# Patient Record
Sex: FEMALE | Race: BLACK OR AFRICAN AMERICAN | Employment: OTHER | URBAN - METROPOLITAN AREA
[De-identification: names, ages, dates, MRNs, and addresses within clinical notes are randomized per-mention and may not be internally consistent; named-entity substitution may affect disease eponyms.]

---

## 2018-01-01 ENCOUNTER — HOSPITAL ENCOUNTER (INPATIENT)
Age: 83
LOS: 1 days | Discharge: HOME | DRG: 308 | End: 2018-04-28
Attending: EMERGENCY MEDICINE | Admitting: INTERNAL MEDICINE
Payer: MEDICARE

## 2018-01-01 ENCOUNTER — APPOINTMENT (OUTPATIENT)
Dept: GENERAL RADIOLOGY | Age: 83
DRG: 308 | End: 2018-01-01
Attending: EMERGENCY MEDICINE
Payer: MEDICARE

## 2018-01-01 VITALS
SYSTOLIC BLOOD PRESSURE: 127 MMHG | BODY MASS INDEX: 19.94 KG/M2 | DIASTOLIC BLOOD PRESSURE: 64 MMHG | WEIGHT: 124.1 LBS | TEMPERATURE: 97.8 F | RESPIRATION RATE: 21 BRPM | OXYGEN SATURATION: 100 % | HEART RATE: 73 BPM | HEIGHT: 66 IN

## 2018-01-01 DIAGNOSIS — I50.9 ACUTE ON CHRONIC CONGESTIVE HEART FAILURE, UNSPECIFIED HEART FAILURE TYPE (HCC): ICD-10-CM

## 2018-01-01 DIAGNOSIS — I48.91 ATRIAL FIBRILLATION, UNSPECIFIED TYPE (HCC): Primary | ICD-10-CM

## 2018-01-01 DIAGNOSIS — D64.9 ANEMIA, UNSPECIFIED TYPE: ICD-10-CM

## 2018-01-01 DIAGNOSIS — E83.42 HYPOMAGNESEMIA: ICD-10-CM

## 2018-01-01 LAB
ABO + RH BLD: NORMAL
ALBUMIN SERPL-MCNC: 2.2 G/DL (ref 3.2–4.6)
ALBUMIN/GLOB SERPL: 0.5 {RATIO} (ref 1.2–3.5)
ALP SERPL-CCNC: 104 U/L (ref 50–136)
ALT SERPL-CCNC: 15 U/L (ref 12–65)
ANION GAP SERPL CALC-SCNC: 12 MMOL/L (ref 7–16)
ANION GAP SERPL CALC-SCNC: 9 MMOL/L (ref 7–16)
APPEARANCE UR: CLEAR
AST SERPL-CCNC: 29 U/L (ref 15–37)
ATRIAL RATE: 375 BPM
BASOPHILS # BLD: 0 K/UL (ref 0–0.2)
BASOPHILS # BLD: 0.1 K/UL (ref 0–0.2)
BASOPHILS NFR BLD: 0 % (ref 0–2)
BASOPHILS NFR BLD: 1 % (ref 0–2)
BILIRUB SERPL-MCNC: 0.2 MG/DL (ref 0.2–1.1)
BILIRUB UR QL: NEGATIVE
BLD PROD TYP BPU: NORMAL
BLD PROD TYP BPU: NORMAL
BLOOD GROUP ANTIBODIES SERPL: NORMAL
BNP SERPL-MCNC: 417 PG/ML
BPU ID: NORMAL
BPU ID: NORMAL
BUN SERPL-MCNC: 18 MG/DL (ref 8–23)
BUN SERPL-MCNC: 20 MG/DL (ref 8–23)
CALCIUM SERPL-MCNC: 7.6 MG/DL (ref 8.3–10.4)
CALCIUM SERPL-MCNC: 7.9 MG/DL (ref 8.3–10.4)
CALCULATED R AXIS, ECG10: -5 DEGREES
CALCULATED T AXIS, ECG11: 33 DEGREES
CHLORIDE SERPL-SCNC: 105 MMOL/L (ref 98–107)
CHLORIDE SERPL-SCNC: 105 MMOL/L (ref 98–107)
CO2 SERPL-SCNC: 24 MMOL/L (ref 21–32)
CO2 SERPL-SCNC: 27 MMOL/L (ref 21–32)
COLOR UR: YELLOW
CREAT SERPL-MCNC: 1.37 MG/DL (ref 0.6–1)
CREAT SERPL-MCNC: 1.49 MG/DL (ref 0.6–1)
CROSSMATCH RESULT,%XM: NORMAL
CROSSMATCH RESULT,%XM: NORMAL
DIAGNOSIS, 93000: NORMAL
DIFFERENTIAL METHOD BLD: ABNORMAL
DIFFERENTIAL METHOD BLD: ABNORMAL
EOSINOPHIL # BLD: 0.2 K/UL (ref 0–0.8)
EOSINOPHIL # BLD: 0.4 K/UL (ref 0–0.8)
EOSINOPHIL NFR BLD: 2 % (ref 0.5–7.8)
EOSINOPHIL NFR BLD: 4 % (ref 0.5–7.8)
ERYTHROCYTE [DISTWIDTH] IN BLOOD BY AUTOMATED COUNT: 18.2 % (ref 11.9–14.6)
ERYTHROCYTE [DISTWIDTH] IN BLOOD BY AUTOMATED COUNT: 20.4 % (ref 11.9–14.6)
FERRITIN SERPL-MCNC: 22 NG/ML (ref 8–388)
FOLATE SERPL-MCNC: 13.4 NG/ML (ref 3.1–17.5)
GLOBULIN SER CALC-MCNC: 4.2 G/DL (ref 2.3–3.5)
GLUCOSE SERPL-MCNC: 80 MG/DL (ref 65–100)
GLUCOSE SERPL-MCNC: 96 MG/DL (ref 65–100)
GLUCOSE UR STRIP.AUTO-MCNC: NEGATIVE MG/DL
HCT VFR BLD AUTO: 25 % (ref 35.8–46.3)
HCT VFR BLD AUTO: 29.2 % (ref 35.8–46.3)
HGB BLD-MCNC: 7.3 G/DL (ref 11.7–15.4)
HGB BLD-MCNC: 9.2 G/DL (ref 11.7–15.4)
HGB RETIC QN AUTO: 23 PG (ref 29–35)
HGB UR QL STRIP: NEGATIVE
IMM GRANULOCYTES # BLD: 0 K/UL (ref 0–0.5)
IMM GRANULOCYTES # BLD: 0.1 K/UL (ref 0–0.5)
IMM GRANULOCYTES NFR BLD AUTO: 0 % (ref 0–5)
IMM GRANULOCYTES NFR BLD AUTO: 1 % (ref 0–5)
IMM RETICS NFR: 21 % (ref 3–15.9)
IRON SATN MFR SERPL: 6 %
IRON SERPL-MCNC: 27 UG/DL (ref 35–150)
KETONES UR QL STRIP.AUTO: NEGATIVE MG/DL
LEUKOCYTE ESTERASE UR QL STRIP.AUTO: NEGATIVE
LYMPHOCYTES # BLD: 2 K/UL (ref 0.5–4.6)
LYMPHOCYTES # BLD: 2 K/UL (ref 0.5–4.6)
LYMPHOCYTES NFR BLD: 18 % (ref 13–44)
LYMPHOCYTES NFR BLD: 20 % (ref 13–44)
MAGNESIUM SERPL-MCNC: 1.2 MG/DL (ref 1.8–2.4)
MAGNESIUM SERPL-MCNC: 1.4 MG/DL (ref 1.8–2.4)
MAGNESIUM SERPL-MCNC: 2 MG/DL (ref 1.8–2.4)
MCH RBC QN AUTO: 23.6 PG (ref 26.1–32.9)
MCH RBC QN AUTO: 26.1 PG (ref 26.1–32.9)
MCHC RBC AUTO-ENTMCNC: 29.2 G/DL (ref 31.4–35)
MCHC RBC AUTO-ENTMCNC: 31.5 G/DL (ref 31.4–35)
MCV RBC AUTO: 80.9 FL (ref 79.6–97.8)
MCV RBC AUTO: 82.7 FL (ref 79.6–97.8)
MONOCYTES # BLD: 0.5 K/UL (ref 0.1–1.3)
MONOCYTES # BLD: 0.9 K/UL (ref 0.1–1.3)
MONOCYTES NFR BLD: 5 % (ref 4–12)
MONOCYTES NFR BLD: 9 % (ref 4–12)
NEUTS SEG # BLD: 6.9 K/UL (ref 1.7–8.2)
NEUTS SEG # BLD: 7.9 K/UL (ref 1.7–8.2)
NEUTS SEG NFR BLD: 66 % (ref 43–78)
NEUTS SEG NFR BLD: 74 % (ref 43–78)
NITRITE UR QL STRIP.AUTO: NEGATIVE
PH UR STRIP: 5.5 [PH] (ref 5–9)
PLATELET # BLD AUTO: 312 K/UL (ref 150–450)
PLATELET # BLD AUTO: 334 K/UL (ref 150–450)
PMV BLD AUTO: 10 FL (ref 10.8–14.1)
PMV BLD AUTO: 10.1 FL (ref 10.8–14.1)
POTASSIUM SERPL-SCNC: 3.4 MMOL/L (ref 3.5–5.1)
POTASSIUM SERPL-SCNC: 3.7 MMOL/L (ref 3.5–5.1)
PROT SERPL-MCNC: 6.4 G/DL (ref 6.3–8.2)
PROT UR STRIP-MCNC: NEGATIVE MG/DL
Q-T INTERVAL, ECG07: 254 MS
QRS DURATION, ECG06: 102 MS
QTC CALCULATION (BEZET), ECG08: 441 MS
RBC # BLD AUTO: 3.09 M/UL (ref 4.05–5.25)
RBC # BLD AUTO: 3.53 M/UL (ref 4.05–5.25)
RETICS # AUTO: 0.05 M/UL (ref 0.02–0.08)
RETICS/RBC NFR AUTO: 1.7 % (ref 0.3–2)
SODIUM SERPL-SCNC: 141 MMOL/L (ref 136–145)
SODIUM SERPL-SCNC: 141 MMOL/L (ref 136–145)
SP GR UR REFRACTOMETRY: 1 (ref 1–1.02)
SPECIMEN EXP DATE BLD: NORMAL
STATUS OF UNIT,%ST: NORMAL
STATUS OF UNIT,%ST: NORMAL
TIBC SERPL-MCNC: 470 UG/DL (ref 250–450)
TROPONIN I SERPL-MCNC: <0.04 NG/ML (ref 0.02–0.05)
TSH SERPL DL<=0.005 MIU/L-ACNC: 2.76 UIU/ML (ref 0.36–3.74)
UNIT DIVISION, %UDIV: 0
UNIT DIVISION, %UDIV: 0
UROBILINOGEN UR QL STRIP.AUTO: 0.2 EU/DL (ref 0.2–1)
VENTRICULAR RATE, ECG03: 181 BPM
VIT B12 SERPL-MCNC: 365 PG/ML (ref 193–986)
WBC # BLD AUTO: 10.3 K/UL (ref 4.3–11.1)
WBC # BLD AUTO: 10.6 K/UL (ref 4.3–11.1)

## 2018-01-01 PROCEDURE — 85046 RETICYTE/HGB CONCENTRATE: CPT | Performed by: INTERNAL MEDICINE

## 2018-01-01 PROCEDURE — 83540 ASSAY OF IRON: CPT | Performed by: INTERNAL MEDICINE

## 2018-01-01 PROCEDURE — 86900 BLOOD TYPING SEROLOGIC ABO: CPT | Performed by: INTERNAL MEDICINE

## 2018-01-01 PROCEDURE — 74011000250 HC RX REV CODE- 250: Performed by: EMERGENCY MEDICINE

## 2018-01-01 PROCEDURE — 71045 X-RAY EXAM CHEST 1 VIEW: CPT

## 2018-01-01 PROCEDURE — 36415 COLL VENOUS BLD VENIPUNCTURE: CPT | Performed by: INTERNAL MEDICINE

## 2018-01-01 PROCEDURE — 77030032490 HC SLV COMPR SCD KNE COVD -B

## 2018-01-01 PROCEDURE — 83735 ASSAY OF MAGNESIUM: CPT | Performed by: INTERNAL MEDICINE

## 2018-01-01 PROCEDURE — 84443 ASSAY THYROID STIM HORMONE: CPT | Performed by: INTERNAL MEDICINE

## 2018-01-01 PROCEDURE — 99285 EMERGENCY DEPT VISIT HI MDM: CPT | Performed by: EMERGENCY MEDICINE

## 2018-01-01 PROCEDURE — 74011000302 HC RX REV CODE- 302: Performed by: INTERNAL MEDICINE

## 2018-01-01 PROCEDURE — 83735 ASSAY OF MAGNESIUM: CPT | Performed by: EMERGENCY MEDICINE

## 2018-01-01 PROCEDURE — 86580 TB INTRADERMAL TEST: CPT | Performed by: INTERNAL MEDICINE

## 2018-01-01 PROCEDURE — 82746 ASSAY OF FOLIC ACID SERUM: CPT | Performed by: INTERNAL MEDICINE

## 2018-01-01 PROCEDURE — 74011250636 HC RX REV CODE- 250/636: Performed by: FAMILY MEDICINE

## 2018-01-01 PROCEDURE — 81003 URINALYSIS AUTO W/O SCOPE: CPT | Performed by: INTERNAL MEDICINE

## 2018-01-01 PROCEDURE — 97161 PT EVAL LOW COMPLEX 20 MIN: CPT

## 2018-01-01 PROCEDURE — 97165 OT EVAL LOW COMPLEX 30 MIN: CPT

## 2018-01-01 PROCEDURE — 93005 ELECTROCARDIOGRAM TRACING: CPT | Performed by: EMERGENCY MEDICINE

## 2018-01-01 PROCEDURE — 96375 TX/PRO/DX INJ NEW DRUG ADDON: CPT | Performed by: EMERGENCY MEDICINE

## 2018-01-01 PROCEDURE — 74011250636 HC RX REV CODE- 250/636: Performed by: INTERNAL MEDICINE

## 2018-01-01 PROCEDURE — P9016 RBC LEUKOCYTES REDUCED: HCPCS | Performed by: INTERNAL MEDICINE

## 2018-01-01 PROCEDURE — 77030039270 HC TU BLD FLTR CARD -A

## 2018-01-01 PROCEDURE — 74011250637 HC RX REV CODE- 250/637: Performed by: INTERNAL MEDICINE

## 2018-01-01 PROCEDURE — 80048 BASIC METABOLIC PNL TOTAL CA: CPT | Performed by: INTERNAL MEDICINE

## 2018-01-01 PROCEDURE — 30243N1 TRANSFUSION OF NONAUTOLOGOUS RED BLOOD CELLS INTO CENTRAL VEIN, PERCUTANEOUS APPROACH: ICD-10-PCS | Performed by: INTERNAL MEDICINE

## 2018-01-01 PROCEDURE — 96374 THER/PROPH/DIAG INJ IV PUSH: CPT | Performed by: EMERGENCY MEDICINE

## 2018-01-01 PROCEDURE — 84484 ASSAY OF TROPONIN QUANT: CPT | Performed by: EMERGENCY MEDICINE

## 2018-01-01 PROCEDURE — 93306 TTE W/DOPPLER COMPLETE: CPT

## 2018-01-01 PROCEDURE — 74011250636 HC RX REV CODE- 250/636: Performed by: EMERGENCY MEDICINE

## 2018-01-01 PROCEDURE — 74011000258 HC RX REV CODE- 258: Performed by: INTERNAL MEDICINE

## 2018-01-01 PROCEDURE — 80053 COMPREHEN METABOLIC PANEL: CPT | Performed by: EMERGENCY MEDICINE

## 2018-01-01 PROCEDURE — 36430 TRANSFUSION BLD/BLD COMPNT: CPT

## 2018-01-01 PROCEDURE — 65270000029 HC RM PRIVATE

## 2018-01-01 PROCEDURE — 85025 COMPLETE CBC W/AUTO DIFF WBC: CPT | Performed by: INTERNAL MEDICINE

## 2018-01-01 PROCEDURE — 74011000250 HC RX REV CODE- 250: Performed by: INTERNAL MEDICINE

## 2018-01-01 PROCEDURE — 74011250637 HC RX REV CODE- 250/637: Performed by: EMERGENCY MEDICINE

## 2018-01-01 PROCEDURE — 85025 COMPLETE CBC W/AUTO DIFF WBC: CPT | Performed by: EMERGENCY MEDICINE

## 2018-01-01 PROCEDURE — 77030019605

## 2018-01-01 PROCEDURE — 82607 VITAMIN B-12: CPT | Performed by: INTERNAL MEDICINE

## 2018-01-01 PROCEDURE — 65660000000 HC RM CCU STEPDOWN

## 2018-01-01 PROCEDURE — 86923 COMPATIBILITY TEST ELECTRIC: CPT | Performed by: INTERNAL MEDICINE

## 2018-01-01 PROCEDURE — 83880 ASSAY OF NATRIURETIC PEPTIDE: CPT | Performed by: EMERGENCY MEDICINE

## 2018-01-01 PROCEDURE — 84484 ASSAY OF TROPONIN QUANT: CPT | Performed by: INTERNAL MEDICINE

## 2018-01-01 PROCEDURE — 82728 ASSAY OF FERRITIN: CPT | Performed by: INTERNAL MEDICINE

## 2018-01-01 RX ORDER — LANOLIN ALCOHOL/MO/W.PET/CERES
400 CREAM (GRAM) TOPICAL ONCE
Status: COMPLETED | OUTPATIENT
Start: 2018-01-01 | End: 2018-01-01

## 2018-01-01 RX ORDER — SIMVASTATIN 20 MG/1
10 TABLET, FILM COATED ORAL
Status: DISCONTINUED | OUTPATIENT
Start: 2018-01-01 | End: 2018-01-01 | Stop reason: HOSPADM

## 2018-01-01 RX ORDER — ONDANSETRON 2 MG/ML
4 INJECTION INTRAMUSCULAR; INTRAVENOUS
Status: DISCONTINUED | OUTPATIENT
Start: 2018-01-01 | End: 2018-01-01 | Stop reason: HOSPADM

## 2018-01-01 RX ORDER — MECLIZINE HYDROCHLORIDE 25 MG/1
25 TABLET ORAL
Qty: 60 TAB | Refills: 1 | Status: SHIPPED | OUTPATIENT
Start: 2018-01-01 | End: 2019-01-01

## 2018-01-01 RX ORDER — NALOXONE HYDROCHLORIDE 0.4 MG/ML
0.4 INJECTION, SOLUTION INTRAMUSCULAR; INTRAVENOUS; SUBCUTANEOUS AS NEEDED
Status: DISCONTINUED | OUTPATIENT
Start: 2018-01-01 | End: 2018-01-01 | Stop reason: HOSPADM

## 2018-01-01 RX ORDER — FUROSEMIDE 40 MG/1
40 TABLET ORAL DAILY
Status: DISCONTINUED | OUTPATIENT
Start: 2018-01-01 | End: 2018-01-01 | Stop reason: HOSPADM

## 2018-01-01 RX ORDER — LISINOPRIL 5 MG/1
5 TABLET ORAL DAILY
Status: DISCONTINUED | OUTPATIENT
Start: 2018-01-01 | End: 2018-01-01 | Stop reason: HOSPADM

## 2018-01-01 RX ORDER — CALCIUM CARBONATE 200(500)MG
1 TABLET,CHEWABLE ORAL
COMMUNITY
End: 2019-01-01

## 2018-01-01 RX ORDER — FUROSEMIDE 10 MG/ML
60 INJECTION INTRAMUSCULAR; INTRAVENOUS
Status: COMPLETED | OUTPATIENT
Start: 2018-01-01 | End: 2018-01-01

## 2018-01-01 RX ORDER — SODIUM CHLORIDE 9 MG/ML
250 INJECTION, SOLUTION INTRAVENOUS AS NEEDED
Status: DISCONTINUED | OUTPATIENT
Start: 2018-01-01 | End: 2018-01-01 | Stop reason: HOSPADM

## 2018-01-01 RX ORDER — SIMVASTATIN 20 MG/1
20 TABLET, FILM COATED ORAL
Status: DISCONTINUED | OUTPATIENT
Start: 2018-01-01 | End: 2018-01-01

## 2018-01-01 RX ORDER — DILTIAZEM HYDROCHLORIDE 120 MG/1
120 TABLET, FILM COATED ORAL DAILY
COMMUNITY
Start: 2019-01-01 | End: 2019-01-01 | Stop reason: SDUPTHER

## 2018-01-01 RX ORDER — LANOLIN ALCOHOL/MO/W.PET/CERES
325 CREAM (GRAM) TOPICAL
Qty: 30 TAB | Refills: 2 | Status: SHIPPED | OUTPATIENT
Start: 2018-01-01 | End: 2019-01-01 | Stop reason: SDUPTHER

## 2018-01-01 RX ORDER — SODIUM CHLORIDE 0.9 % (FLUSH) 0.9 %
5-10 SYRINGE (ML) INJECTION AS NEEDED
Status: DISCONTINUED | OUTPATIENT
Start: 2018-01-01 | End: 2018-01-01 | Stop reason: HOSPADM

## 2018-01-01 RX ORDER — FUROSEMIDE 10 MG/ML
40 INJECTION INTRAMUSCULAR; INTRAVENOUS ONCE
Status: COMPLETED | OUTPATIENT
Start: 2018-01-01 | End: 2018-01-01

## 2018-01-01 RX ORDER — PANTOPRAZOLE SODIUM 40 MG/1
40 TABLET, DELAYED RELEASE ORAL
Status: DISCONTINUED | OUTPATIENT
Start: 2018-01-01 | End: 2018-01-01 | Stop reason: HOSPADM

## 2018-01-01 RX ORDER — HYDROCODONE BITARTRATE AND ACETAMINOPHEN 5; 325 MG/1; MG/1
1 TABLET ORAL
Status: DISCONTINUED | OUTPATIENT
Start: 2018-01-01 | End: 2018-01-01 | Stop reason: HOSPADM

## 2018-01-01 RX ORDER — GUAIFENESIN 100 MG/5ML
81 LIQUID (ML) ORAL DAILY
COMMUNITY
End: 2019-01-01 | Stop reason: CLARIF

## 2018-01-01 RX ORDER — MAGNESIUM SULFATE 1 G/100ML
1 INJECTION INTRAVENOUS ONCE
Status: DISCONTINUED | OUTPATIENT
Start: 2018-01-01 | End: 2018-01-01

## 2018-01-01 RX ORDER — ACETAMINOPHEN 325 MG/1
650 TABLET ORAL
Status: DISCONTINUED | OUTPATIENT
Start: 2018-01-01 | End: 2018-01-01 | Stop reason: HOSPADM

## 2018-01-01 RX ORDER — LISINOPRIL 5 MG/1
5 TABLET ORAL DAILY
COMMUNITY
End: 2019-01-01 | Stop reason: ALTCHOICE

## 2018-01-01 RX ORDER — MAGNESIUM SULFATE 1 G/100ML
1 INJECTION INTRAVENOUS ONCE
Status: COMPLETED | OUTPATIENT
Start: 2018-01-01 | End: 2018-01-01

## 2018-01-01 RX ORDER — FUROSEMIDE 40 MG/1
40 TABLET ORAL DAILY
COMMUNITY
Start: 2019-01-01 | End: 2019-01-01 | Stop reason: SDUPTHER

## 2018-01-01 RX ORDER — GLUCOSAMINE SULFATE 1500 MG
1000 POWDER IN PACKET (EA) ORAL DAILY
COMMUNITY
End: 2019-01-01

## 2018-01-01 RX ORDER — POTASSIUM CHLORIDE 20MEQ/15ML
40 LIQUID (ML) ORAL
Status: DISCONTINUED | OUTPATIENT
Start: 2018-01-01 | End: 2018-01-01

## 2018-01-01 RX ORDER — FUROSEMIDE 10 MG/ML
40 INJECTION INTRAMUSCULAR; INTRAVENOUS DAILY
Status: DISCONTINUED | OUTPATIENT
Start: 2018-01-01 | End: 2018-01-01

## 2018-01-01 RX ORDER — LANOLIN ALCOHOL/MO/W.PET/CERES
1 CREAM (GRAM) TOPICAL
Status: DISCONTINUED | OUTPATIENT
Start: 2018-01-01 | End: 2018-01-01 | Stop reason: HOSPADM

## 2018-01-01 RX ORDER — DILTIAZEM HYDROCHLORIDE 60 MG/1
120 TABLET, FILM COATED ORAL DAILY
Status: DISCONTINUED | OUTPATIENT
Start: 2018-01-01 | End: 2018-01-01 | Stop reason: HOSPADM

## 2018-01-01 RX ORDER — MAGNESIUM SULFATE HEPTAHYDRATE 40 MG/ML
2 INJECTION, SOLUTION INTRAVENOUS ONCE
Status: COMPLETED | OUTPATIENT
Start: 2018-01-01 | End: 2018-01-01

## 2018-01-01 RX ORDER — OMEPRAZOLE 40 MG/1
40 CAPSULE, DELAYED RELEASE ORAL DAILY
COMMUNITY
Start: 2019-01-01

## 2018-01-01 RX ORDER — DILTIAZEM HYDROCHLORIDE 5 MG/ML
0.25 INJECTION INTRAVENOUS ONCE
Status: COMPLETED | OUTPATIENT
Start: 2018-01-01 | End: 2018-01-01

## 2018-01-01 RX ORDER — SODIUM CHLORIDE 0.9 % (FLUSH) 0.9 %
5-10 SYRINGE (ML) INJECTION EVERY 8 HOURS
Status: DISCONTINUED | OUTPATIENT
Start: 2018-01-01 | End: 2018-01-01 | Stop reason: HOSPADM

## 2018-01-01 RX ORDER — POTASSIUM CHLORIDE 20 MEQ/1
40 TABLET, EXTENDED RELEASE ORAL
Status: COMPLETED | OUTPATIENT
Start: 2018-01-01 | End: 2018-01-01

## 2018-01-01 RX ORDER — SIMVASTATIN 20 MG/1
20 TABLET, FILM COATED ORAL
COMMUNITY
End: 2019-01-01

## 2018-01-01 RX ORDER — LOPERAMIDE HCL 2 MG
2 TABLET ORAL
COMMUNITY
Start: 2019-01-01

## 2018-01-01 RX ORDER — MECLIZINE HYDROCHLORIDE 25 MG/1
25 TABLET ORAL DAILY
Status: ON HOLD | COMMUNITY
End: 2018-01-01

## 2018-01-01 RX ADMIN — FUROSEMIDE 60 MG: 10 INJECTION, SOLUTION INTRAMUSCULAR; INTRAVENOUS at 12:44

## 2018-01-01 RX ADMIN — POTASSIUM CHLORIDE 40 MEQ: 20 TABLET, EXTENDED RELEASE ORAL at 15:08

## 2018-01-01 RX ADMIN — FUROSEMIDE 40 MG: 10 INJECTION, SOLUTION INTRAMUSCULAR; INTRAVENOUS at 20:00

## 2018-01-01 RX ADMIN — TUBERCULIN PURIFIED PROTEIN DERIVATIVE 5 UNITS: 5 INJECTION, SOLUTION INTRADERMAL at 15:08

## 2018-01-01 RX ADMIN — Medication 10 ML: at 06:30

## 2018-01-01 RX ADMIN — SIMVASTATIN 20 MG: 20 TABLET, FILM COATED ORAL at 21:56

## 2018-01-01 RX ADMIN — Medication 10 ML: at 21:57

## 2018-01-01 RX ADMIN — LISINOPRIL 5 MG: 5 TABLET ORAL at 08:15

## 2018-01-01 RX ADMIN — DILTIAZEM HYDROCHLORIDE 13.5 MG: 5 INJECTION INTRAVENOUS at 10:53

## 2018-01-01 RX ADMIN — Medication 10 ML: at 14:00

## 2018-01-01 RX ADMIN — FUROSEMIDE 40 MG: 10 INJECTION, SOLUTION INTRAMUSCULAR; INTRAVENOUS at 08:15

## 2018-01-01 RX ADMIN — DILTIAZEM HYDROCHLORIDE 120 MG: 60 TABLET, FILM COATED ORAL at 10:39

## 2018-01-01 RX ADMIN — SODIUM CHLORIDE 250 ML: 900 INJECTION, SOLUTION INTRAVENOUS at 16:34

## 2018-01-01 RX ADMIN — MAGNESIUM SULFATE HEPTAHYDRATE 2 G: 40 INJECTION, SOLUTION INTRAVENOUS at 06:34

## 2018-01-01 RX ADMIN — SODIUM CHLORIDE 5 MG/HR: 900 INJECTION, SOLUTION INTRAVENOUS at 15:00

## 2018-01-01 RX ADMIN — Medication 10 ML: at 15:00

## 2018-01-01 RX ADMIN — PANTOPRAZOLE SODIUM 40 MG: 40 TABLET, DELAYED RELEASE ORAL at 06:37

## 2018-01-01 RX ADMIN — MAGNESIUM SULFATE HEPTAHYDRATE 1 G: 1 INJECTION, SOLUTION INTRAVENOUS at 07:34

## 2018-01-01 RX ADMIN — FERROUS SULFATE TAB 325 MG (65 MG ELEMENTAL FE) 325 MG: 325 (65 FE) TAB at 10:39

## 2018-01-01 RX ADMIN — Medication 400 MG: at 12:26

## 2018-04-27 PROBLEM — E83.42 HYPOMAGNESEMIA: Status: ACTIVE | Noted: 2018-01-01

## 2018-04-27 PROBLEM — N18.30 CKD (CHRONIC KIDNEY DISEASE) STAGE 3, GFR 30-59 ML/MIN (HCC): Chronic | Status: ACTIVE | Noted: 2018-01-01

## 2018-04-27 PROBLEM — Z90.2 S/P LOBECTOMY OF LUNG: Chronic | Status: ACTIVE | Noted: 2018-01-01

## 2018-04-27 PROBLEM — I50.23 SYSTOLIC CHF, ACUTE ON CHRONIC (HCC): Chronic | Status: ACTIVE | Noted: 2018-01-01

## 2018-04-27 PROBLEM — E87.6 HYPOKALEMIA: Status: ACTIVE | Noted: 2018-01-01

## 2018-04-27 PROBLEM — I48.91 ATRIAL FIBRILLATION WITH RAPID VENTRICULAR RESPONSE (HCC): Status: ACTIVE | Noted: 2018-01-01

## 2018-04-27 PROBLEM — D64.9 CHRONIC ANEMIA: Chronic | Status: ACTIVE | Noted: 2018-01-01

## 2018-04-27 NOTE — IP AVS SNAPSHOT
Summary of Care Report The Summary of Care report has been created to help improve care coordination. Users with access to [employer] or [address] (Web-based application) may access additional patient information including the Discharge Summary. If you are not currently a [address] user and need more information, please call the number listed below in the [place] [address] section and ask to be connected with Medical Records. Facility Information Name Address Phone [id number] [place] [address] [address] [place] [id number] [phone] Patient Information Patient Name Sex ALMA Lowe ([id number]) Female 1932 Discharge Information Admitting Provider Service Area Unit Dominik Oro MD / [place] [address] Intensive Care / [phone] Discharge Provider Discharge Date/Time Discharge Disposition Destination (none) 2018 (Pending) AHR (none) Patient Language Language ENGLISH [13] Hospital Problems as of 2018  Never Reviewed Class Noted - Resolved Last Modified POA Active Problems * (Principal)Atrial fibrillation with rapid ventricular response ([address]  2018 - Present 2018 by Dominik Oro MD Yes Entered by Dominik rOo MD  
  Systolic CHF, acute on chronic [facility]) (Chronic)  2018 - Present 2018 by Dominik Oro MD Yes Entered by Dominik Oro MD  
  Solitary right kidney (Chronic)  2018 - Present 2018 by Dominik Oro MD Yes Entered by Dominik Oro MD  
  S/P lobectomy of lung (Chronic)  2018 - Present 2018 by Dominik Oro MD Yes   Entered by Dominik Oro MD  
  CKD (chronic kidney disease) stage 3, GFR 30-59 ml/min (Chronic) 4/27/2018 - Present 4/27/2018 by Keenan Haynes MD Yes Entered by Keenan Haynes MD  
  Chronic anemia (Chronic)  4/27/2018 - Present 4/27/2018 by Keenan Haynes MD Yes Entered by Keenan Haynes MD  
  Hypomagnesemia  4/27/2018 - Present 4/27/2018 by Keenan Haynes MD Yes Entered by Keenan Haynes MD  
  Hypokalemia  4/27/2018 - Present 4/27/2018 by Keenan Haynes MD Yes Entered by Keenan Haynes MD  
  
You are allergic to the following No active allergies Current Discharge Medication List  
  
START taking these medications Dose & Instructions Dispensing Information Comments  
 ferrous sulfate 325 mg (65 mg iron) tablet Dose:  325 mg Take 1 Tab by mouth daily (with breakfast). Quantity:  30 Tab Refills:  2 CONTINUE these medications which have CHANGED Dose & Instructions Dispensing Information Comments  
 meclizine 25 mg tablet Commonly known as:  ANTIVERT What changed:   
- when to take this 
- reasons to take this Dose:  25 mg Take 1 Tab by mouth three (3) times daily as needed. Indications: Vertigo, dizziness Quantity:  60 Tab Refills:  1 CONTINUE these medications which have NOT CHANGED Dose & Instructions Dispensing Information Comments  
 aspirin 81 mg chewable tablet Dose:  81 mg Take 81 mg by mouth daily. Refills:  0  
   
 calcium carbonate 200 mg calcium (500 mg) Chew Commonly known as:  TUMS Dose:  1 Tab Take 1 Tab by mouth every six (6) hours as needed. Indications: Heartburn Refills:  0 CARDIZEM 120 mg tablet Generic drug:  dilTIAZem Dose:  120 mg Take 120 mg by mouth daily. Refills:  0  
   
 LASIX 40 mg tablet Generic drug:  furosemide Take  by mouth daily. Refills:  0  
   
 lisinopril 5 mg tablet Commonly known as:  Harpal Barbosa Dose:  5 mg Take 5 mg by mouth daily. Refills:  0 loperamide 2 mg tablet Commonly known as:  IMMODIUM Dose:  2 mg Take 2 mg by mouth four (4) times daily as needed for Diarrhea. Indications: Diarrhea Refills:  0  
   
 omeprazole 40 mg capsule Commonly known as:  PRILOSEC Dose:  40 mg Take 40 mg by mouth daily. Refills:  0  
   
 VITAMIN D3 1,000 unit Cap Generic drug:  cholecalciferol Dose:  1000 Units Take 1,000 Units by mouth daily. Refills:  0 ZOCOR 20 mg tablet Generic drug:  simvastatin Dose:  20 mg Take 20 mg by mouth nightly. Refills:  0 Current Immunizations Name Date  
 TB Skin Test (PPD) Intradermal 4/27/2018 Follow-up Information Follow up With Details Comments Contact Info Not On File [facility] In 1 week follow up Not On File (58) Patient has a PCP but that physician is not listed in [facility]. Discharge Instructions Follow up with Primary Care Physician in 1-2 weeks. DISCHARGE SUMMARY from Nurse PATIENT INSTRUCTIONS: 
 
 
F-face looks uneven A-arms unable to move or move unevenly S-speech slurred or non-existent T-time-call 911 as soon as signs and symptoms begin-DO NOT go Back to bed or wait to see if you get better-TIME IS BRAIN. Warning Signs of HEART ATTACK Call 911 if you have these symptoms: 
? Chest discomfort. Most heart attacks involve discomfort in the center of the chest that lasts more than a few minutes, or that goes away and comes back. It can feel like uncomfortable pressure, squeezing, fullness, or pain. ? Discomfort in other areas of the upper body. Symptoms can include pain or discomfort in one or both arms, the back, neck, jaw, or stomach. ? Shortness of breath with or without chest discomfort. ? Other signs may include breaking out in a cold sweat, nausea, or lightheadedness. Don't wait more than five minutes to call [address]! Fast action can save your life. Calling 911 is almost always the fastest way to get lifesaving treatment. Emergency Medical Services staff can begin treatment when they arrive  up to an hour sooner than if someone gets to the hospital by car. The discharge information has been reviewed with the patient. The patient verbalized understanding. Discharge medications reviewed with the patient and appropriate educational materials and side effects teaching were provided. ___________________________________________________________________________________________________________________________________ Learning About ACE Inhibitors for Heart Failure Introduction ACE (angiotensin-converting enzyme) inhibitors block an enzyme that makes blood vessels narrow. As a result, the blood vessels relax and widen. This lowers blood pressure. These medicines also put more water and salt into the urine. This also lowers blood pressure. In heart failure, your heart does not pump as much blood as your body needs. These medicines: · Make it easier for your heart to pump. · Help reduce symptoms. · Make a heart attack or stroke less likely. Examples These medicines include: · Benazepril (Lotensin). · Lisinopril (Prinivil, Zestril). · Ramipril (Altace). This is not a complete list. 
Possible side effects Side effects may include: · Cough. · Low blood pressure. You may feel dizzy and weak. · High potassium levels. · An allergic reaction. You may have other side effects or reactions not listed here. Check the information that comes with your medicine. What to know about taking this medicine · ACE inhibitors: ¨ Are often used to treat heart failure. They may be the only medicine used if your only symptoms are feeling tired and mildly short of breath with no fluid buildup (edema). But in most other cases, they are used with other medicines. ¨ Can cause a dry cough. If the cough is bad, talk to your doctor. You may need to try a different medicine. ¨ Can relieve symptoms, improve how you feel, and make it less likely you will need to stay in a hospital. And they can reduce the risk of early death. ¨ Can cause an allergic reaction of the skin. Symptoms may range from mild swelling to painful welts. It is rare to have severe swelling that makes it hard to breathe. · Take your medicines exactly as prescribed. Call your doctor if you think you are having a problem with your medicine. · Check with your doctor or pharmacist before you use any other medicines. This includes over-the-counter medicines. Make sure your doctor knows all of the medicines, vitamins, herbal products, and supplements you take. Taking some medicines together can cause problems. · You should not take an ACE inhibitor if you are pregnant or planning to become pregnant. · You may need regular blood tests. Where can you learn more? Go to http://nacho-mathew.info/. Enter S179 in the search box to learn more about \"Learning About ACE Inhibitors for Heart Failure. \" Current as of: September 21, 2016 Content Version: 11.4 © 4325-7286 [employer]. Care instructions adapted under license by [employer] (which disclaims liability or warranty for this information). If you have questions about a medical condition or this instruction, always ask your healthcare professional. [address] any warranty or liability for your use of this information. Anemia From Heavy Bleeding: Care Instructions Your Care Instructions Anemia means that your body does not have enough red blood cells. Red blood cells carry oxygen around the body. When you have anemia, you may feel dizzy, tired, and weak. You may also feel your heart pounding. For some people, it's hard to focus and think clearly. One common cause of anemia is bleeding. Bleeding from ulcers, hemorrhoids, cancer, or other problems can cause anemia. It may also be caused by heavy menstrual periods. Your treatment may include iron pills. Iron helps your body make hemoglobin. Hemoglobin is the part of the red blood cell that carries oxygen. If you have severe anemia, you may need a blood transfusion to give you red blood cells as quickly as possible. Sometimes it takes several months to get iron levels back to normal. 
Follow-up care is a key part of your treatment and safety. Be sure to make and go to all appointments, and call your doctor if you are having problems. It's also a good idea to know your test results and keep a list of the medicines you take. How can you care for yourself at home? · Be safe with medicines. Take your medicines exactly as prescribed. Call your doctor if you think you are having a problem with your medicine. · Follow your doctor's advice about eating foods that have a lot of iron in them. These include red meat, shellfish, poultry, and eggs. They also include beans, raisins, whole-grain bread, and leafy green vegetables. · Steam your vegetables. This is the best way to prepare them if you want to get as much iron as possible. · Iron pills can cause constipation. If you take them, there are things you can do to avoid constipation. Drink plenty of fluids, eat foods with a lot of fiber, and exercise every day. When should you call for help? Call 911 anytime you think you may need emergency care. For example, call if: 
? · You passed out (lost consciousness). ? · Your stools are maroon or very bloody. ?Call your doctor now or seek immediate medical care if: 
? · You are short of breath. ? · You have new or worse bleeding. ? · You are dizzy or light-headed, or you feel like you may faint. ? Watch closely for changes in your health, and be sure to contact your doctor if: ? · You feel weaker or more tired than usual.  
? · You do not get better as expected. Where can you learn more? Go to http://nacho-mathew.info/. Enter E008 in the search box to learn more about \"Anemia From Heavy Bleeding: Care Instructions. \" Current as of: October 13, 2016 Content Version: 11.4 © 9168-7787 [employer]. Care instructions adapted under license by [employer] (which disclaims liability or warranty for this information). If you have questions about a medical condition or this instruction, always ask your healthcare professional. [address] any warranty or liability for your use of this information. Anemia From Heavy Bleeding: Care Instructions Your Care Instructions Anemia means that your body does not have enough red blood cells. Red blood cells carry oxygen around the body. When you have anemia, you may feel dizzy, tired, and weak. You may also feel your heart pounding. For some people, it's hard to focus and think clearly. One common cause of anemia is bleeding. Bleeding from ulcers, hemorrhoids, cancer, or other problems can cause anemia. It may also be caused by heavy menstrual periods. Your treatment may include iron pills. Iron helps your body make hemoglobin. Hemoglobin is the part of the red blood cell that carries oxygen. If you have severe anemia, you may need a blood transfusion to give you red blood cells as quickly as possible. Sometimes it takes several months to get iron levels back to normal. 
Follow-up care is a key part of your treatment and safety. Be sure to make and go to all appointments, and call your doctor if you are having problems. It's also a good idea to know your test results and keep a list of the medicines you take. How can you care for yourself at home? · Be safe with medicines. Take your medicines exactly as prescribed.  Call your doctor if you think you are having a problem with your medicine. · Follow your doctor's advice about eating foods that have a lot of iron in them. These include red meat, shellfish, poultry, and eggs. They also include beans, raisins, whole-grain bread, and leafy green vegetables. · Steam your vegetables. This is the best way to prepare them if you want to get as much iron as possible. · Iron pills can cause constipation. If you take them, there are things you can do to avoid constipation. Drink plenty of fluids, eat foods with a lot of fiber, and exercise every day. When should you call for help? Call 911 anytime you think you may need emergency care. For example, call if: 
? · You passed out (lost consciousness). ? · Your stools are maroon or very bloody. ?Call your doctor now or seek immediate medical care if: 
? · You are short of breath. ? · You have new or worse bleeding. ? · You are dizzy or light-headed, or you feel like you may faint. ? Watch closely for changes in your health, and be sure to contact your doctor if: 
? · You feel weaker or more tired than usual.  
? · You do not get better as expected. Where can you learn more? Go to http://nacho-mathew.info/. Enter H127 in the search box to learn more about \"Anemia From Heavy Bleeding: Care Instructions. \" Current as of: October 13, 2016 Content Version: 11.4 © 8873-5350 [employer]. Care instructions adapted under license by [employer] (which disclaims liability or warranty for this information). If you have questions about a medical condition or this instruction, always ask your healthcare professional. [place] [address] any warranty or liability for your use of this information. Anemia: Care Instructions Your Care Instructions Anemia is a low level of red blood cells, which carry oxygen throughout your body. Many things can cause anemia. Lack of iron is one of the most common causes. Your body needs iron to make hemoglobin, a substance in red blood cells that carries oxygen from the lungs to your body's cells. Without enough iron, the body produces fewer and smaller red blood cells. As a result, your body's cells do not get enough oxygen, and you feel tired and weak. And you may have trouble concentrating. Bleeding is the most common cause of a lack of iron. You may have heavy menstrual bleeding or bleeding caused by conditions such as ulcers, hemorrhoids, or cancer. Regular use of aspirin or other anti-inflammatory medicines (such as ibuprofen) also can cause bleeding in some people. A lack of iron in your diet also can cause anemia, especially at times when the body needs more iron, such as during pregnancy, infancy, and the teen years. Your doctor may have prescribed iron pills. It may take several months of treatment for your iron levels to return to normal. Your doctor also may suggest that you eat foods that are rich in iron, such as meat and beans. There are many other causes of anemia. It is not always due to a lack of iron. Finding the specific cause of your anemia will help your doctor find the right treatment for you. Follow-up care is a key part of your treatment and safety. Be sure to make and go to all appointments, and call your doctor if you are having problems. It's also a good idea to know your test results and keep a list of the medicines you take. How can you care for yourself at home? · Take your medicines exactly as prescribed. Call your doctor if you think you are having a problem with your medicine. · If your doctor recommends iron pills, take them as directed: ¨ Try to take the pills on an empty stomach about 1 hour before or 2 hours after meals. But you may need to take iron with food to avoid an upset stomach. ¨ Do not take antacids or drink milk or caffeine drinks (such as coffee, tea, or cola) at the same time or within 2 hours of the time that you take your iron. They can make it hard for your body to absorb the iron. ¨ Vitamin C (from food or supplements) helps your body absorb iron. Try taking iron pills with a glass of orange juice or some other food that is high in vitamin C, such as citrus fruits. ¨ Iron pills may cause stomach problems, such as heartburn, nausea, diarrhea, constipation, and cramps. Be sure to drink plenty of fluids, and include fruits, vegetables, and fiber in your diet each day. Iron pills often make your bowel movements dark or green. ¨ If you forget to take an iron pill, do not take a double dose of iron the next time you take a pill. ¨ Keep iron pills out of the reach of small children. An overdose of iron can be very dangerous. · Follow your doctor's advice about eating iron-rich foods. These include red meat, shellfish, poultry, eggs, beans, raisins, whole-grain bread, and leafy green vegetables. · Steam vegetables to help them keep their iron content. When should you call for help? Call 911 anytime you think you may need emergency care. For example, call if: 
? · You have symptoms of a heart attack. These may include: ¨ Chest pain or pressure, or a strange feeling in the chest. 
¨ Sweating. ¨ Shortness of breath. ¨ Nausea or vomiting. ¨ Pain, pressure, or a strange feeling in the back, neck, jaw, or upper belly or in one or both shoulders or arms. ¨ Lightheadedness or sudden weakness. ¨ A fast or irregular heartbeat. After you call 911, the  may tell you to chew 1 adult-strength or 2 to 4 low-dose aspirin. Wait for an ambulance. Do not try to drive yourself. ? · You passed out (lost consciousness). ?Call your doctor now or seek immediate medical care if: 
? · You have new or increased shortness of breath. ? · You are dizzy or lightheaded, or you feel like you may faint. ? · Your fatigue and weakness continue or get worse. ? · You have any abnormal bleeding, such as: 
¨ Nosebleeds. ¨ Vaginal bleeding that is different (heavier, more frequent, at a different time of the month) than what you are used to. ¨ Bloody or black stools, or rectal bleeding. ¨ Bloody or pink urine. ? Watch closely for changes in your health, and be sure to contact your doctor if: 
? · You do not get better as expected. Where can you learn more? Go to http://nacho-mathew.info/. Enter R301 in the search box to learn more about \"Anemia: Care Instructions. \" Current as of: October 13, 2016 Content Version: 11.4 © 9624-9987 [employer]. Care instructions adapted under license by [employer] (which disclaims liability or warranty for this information). If you have questions about a medical condition or this instruction, always ask your healthcare professional. [place] [address] any warranty or liability for your use of this information. Anemia: Care Instructions Your Care Instructions Anemia is a low level of red blood cells, which carry oxygen throughout your body. Many things can cause anemia. Lack of iron is one of the most common causes. Your body needs iron to make hemoglobin, a substance in red blood cells that carries oxygen from the lungs to your body's cells. Without enough iron, the body produces fewer and smaller red blood cells. As a result, your body's cells do not get enough oxygen, and you feel tired and weak. And you may have trouble concentrating. Bleeding is the most common cause of a lack of iron. You may have heavy menstrual bleeding or bleeding caused by conditions such as ulcers, hemorrhoids, or cancer. Regular use of aspirin or other anti-inflammatory medicines (such as ibuprofen) also can cause bleeding in some people.  A lack of iron in your diet also can cause anemia, especially at times when the body needs more iron, such as during pregnancy, infancy, and the teen years. Your doctor may have prescribed iron pills. It may take several months of treatment for your iron levels to return to normal. Your doctor also may suggest that you eat foods that are rich in iron, such as meat and beans. There are many other causes of anemia. It is not always due to a lack of iron. Finding the specific cause of your anemia will help your doctor find the right treatment for you. Follow-up care is a key part of your treatment and safety. Be sure to make and go to all appointments, and call your doctor if you are having problems. It's also a good idea to know your test results and keep a list of the medicines you take. How can you care for yourself at home? · Take your medicines exactly as prescribed. Call your doctor if you think you are having a problem with your medicine. · If your doctor recommends iron pills, take them as directed: ¨ Try to take the pills on an empty stomach about 1 hour before or 2 hours after meals. But you may need to take iron with food to avoid an upset stomach. ¨ Do not take antacids or drink milk or caffeine drinks (such as coffee, tea, or cola) at the same time or within 2 hours of the time that you take your iron. They can make it hard for your body to absorb the iron. ¨ Vitamin C (from food or supplements) helps your body absorb iron. Try taking iron pills with a glass of orange juice or some other food that is high in vitamin C, such as citrus fruits. ¨ Iron pills may cause stomach problems, such as heartburn, nausea, diarrhea, constipation, and cramps. Be sure to drink plenty of fluids, and include fruits, vegetables, and fiber in your diet each day. Iron pills often make your bowel movements dark or green.  
¨ If you forget to take an iron pill, do not take a double dose of iron the next time you take a pill. ¨ Keep iron pills out of the reach of small children. An overdose of iron can be very dangerous. · Follow your doctor's advice about eating iron-rich foods. These include red meat, shellfish, poultry, eggs, beans, raisins, whole-grain bread, and leafy green vegetables. · Steam vegetables to help them keep their iron content. When should you call for help? Call 911 anytime you think you may need emergency care. For example, call if: 
? · You have symptoms of a heart attack. These may include: ¨ Chest pain or pressure, or a strange feeling in the chest. 
¨ Sweating. ¨ Shortness of breath. ¨ Nausea or vomiting. ¨ Pain, pressure, or a strange feeling in the back, neck, jaw, or upper belly or in one or both shoulders or arms. ¨ Lightheadedness or sudden weakness. ¨ A fast or irregular heartbeat. After you call 911, the  may tell you to chew 1 adult-strength or 2 to 4 low-dose aspirin. Wait for an ambulance. Do not try to drive yourself. ? · You passed out (lost consciousness). ?Call your doctor now or seek immediate medical care if: 
? · You have new or increased shortness of breath. ? · You are dizzy or lightheaded, or you feel like you may faint. ? · Your fatigue and weakness continue or get worse. ? · You have any abnormal bleeding, such as: 
¨ Nosebleeds. ¨ Vaginal bleeding that is different (heavier, more frequent, at a different time of the month) than what you are used to. ¨ Bloody or black stools, or rectal bleeding. ¨ Bloody or pink urine. ? Watch closely for changes in your health, and be sure to contact your doctor if: 
? · You do not get better as expected. Where can you learn more? Go to http://nacho-mathew.info/. Enter R301 in the search box to learn more about \"Anemia: Care Instructions. \" Current as of: October 13, 2016 Content Version: 11.4 © 5286-9663 [employer], [employer].  Care instructions adapted under license by [address] (which disclaims liability or warranty for this information). If you have questions about a medical condition or this instruction, always ask your healthcare professional. [address] any warranty or liability for your use of this information. Anemia: Care Instructions Your Care Instructions Anemia is a low level of red blood cells, which carry oxygen throughout your body. Many things can cause anemia. Lack of iron is one of the most common causes. Your body needs iron to make hemoglobin, a substance in red blood cells that carries oxygen from the lungs to your body's cells. Without enough iron, the body produces fewer and smaller red blood cells. As a result, your body's cells do not get enough oxygen, and you feel tired and weak. And you may have trouble concentrating. Bleeding is the most common cause of a lack of iron. You may have heavy menstrual bleeding or bleeding caused by conditions such as ulcers, hemorrhoids, or cancer. Regular use of aspirin or other anti-inflammatory medicines (such as ibuprofen) also can cause bleeding in some people. A lack of iron in your diet also can cause anemia, especially at times when the body needs more iron, such as during pregnancy, infancy, and the teen years. Your doctor may have prescribed iron pills. It may take several months of treatment for your iron levels to return to normal. Your doctor also may suggest that you eat foods that are rich in iron, such as meat and beans. There are many other causes of anemia. It is not always due to a lack of iron. Finding the specific cause of your anemia will help your doctor find the right treatment for you. Follow-up care is a key part of your treatment and safety. Be sure to make and go to all appointments, and call your doctor if you are having problems. It's also a good idea to know your test results and keep a list of the medicines you take. How can you care for yourself at home? · Take your medicines exactly as prescribed. Call your doctor if you think you are having a problem with your medicine. · If your doctor recommends iron pills, take them as directed: ¨ Try to take the pills on an empty stomach about 1 hour before or 2 hours after meals. But you may need to take iron with food to avoid an upset stomach. ¨ Do not take antacids or drink milk or caffeine drinks (such as coffee, tea, or cola) at the same time or within 2 hours of the time that you take your iron. They can make it hard for your body to absorb the iron. ¨ Vitamin C (from food or supplements) helps your body absorb iron. Try taking iron pills with a glass of orange juice or some other food that is high in vitamin C, such as citrus fruits. ¨ Iron pills may cause stomach problems, such as heartburn, nausea, diarrhea, constipation, and cramps. Be sure to drink plenty of fluids, and include fruits, vegetables, and fiber in your diet each day. Iron pills often make your bowel movements dark or green. ¨ If you forget to take an iron pill, do not take a double dose of iron the next time you take a pill. ¨ Keep iron pills out of the reach of small children. An overdose of iron can be very dangerous. · Follow your doctor's advice about eating iron-rich foods. These include red meat, shellfish, poultry, eggs, beans, raisins, whole-grain bread, and leafy green vegetables. · Steam vegetables to help them keep their iron content. When should you call for help? Call 911 anytime you think you may need emergency care. For example, call if: 
? · You have symptoms of a heart attack. These may include: ¨ Chest pain or pressure, or a strange feeling in the chest. 
¨ Sweating. ¨ Shortness of breath. ¨ Nausea or vomiting. ¨ Pain, pressure, or a strange feeling in the back, neck, jaw, or upper belly or in one or both shoulders or arms. ¨ Lightheadedness or sudden weakness. ¨ A fast or irregular heartbeat. After you call 911, the  may tell you to chew 1 adult-strength or 2 to 4 low-dose aspirin. Wait for an ambulance. Do not try to drive yourself. ? · You passed out (lost consciousness). ?Call your doctor now or seek immediate medical care if: 
? · You have new or increased shortness of breath. ? · You are dizzy or lightheaded, or you feel like you may faint. ? · Your fatigue and weakness continue or get worse. ? · You have any abnormal bleeding, such as: 
¨ Nosebleeds. ¨ Vaginal bleeding that is different (heavier, more frequent, at a different time of the month) than what you are used to. ¨ Bloody or black stools, or rectal bleeding. ¨ Bloody or pink urine. ? Watch closely for changes in your health, and be sure to contact your doctor if: 
? · You do not get better as expected. Where can you learn more? Go to http://nacho-mathew.info/. Enter R301 in the search box to learn more about \"Anemia: Care Instructions. \" Current as of: October 13, 2016 Content Version: 11.4 © 0533-4003 [employer]. Care instructions adapted under license by [employer] (which disclaims liability or warranty for this information). If you have questions about a medical condition or this instruction, always ask your healthcare professional. [place] [address] any warranty or liability for your use of this information. Atrial Fibrillation: Care Instructions Your Care Instructions Atrial fibrillation is an irregular and often fast heartbeat. Treating this condition is important for several reasons. It can cause blood clots, which can travel from your heart to your brain and cause a stroke. If you have a fast heartbeat, you may feel lightheaded, dizzy, and weak. An irregular heartbeat can also increase your risk for heart failure.  
Atrial fibrillation is often the result of another heart condition, such as high blood pressure or coronary artery disease. Making changes to improve your heart condition will help you stay healthy and active. Follow-up care is a key part of your treatment and safety. Be sure to make and go to all appointments, and call your doctor if you are having problems. It's also a good idea to know your test results and keep a list of the medicines you take. How can you care for yourself at home? Medicines ? · Take your medicines exactly as prescribed. Call your doctor if you think you are having a problem with your medicine. You will get more details on the specific medicines your doctor prescribes. ? · If your doctor has given you a blood thinner to prevent a stroke, be sure you get instructions about how to take your medicine safely. Blood thinners can cause serious bleeding problems. ? · Do not take any vitamins, over-the-counter drugs, or herbal products without talking to your doctor first. ? Lifestyle changes ? · Do not smoke. Smoking can increase your chance of a stroke and heart attack. If you need help quitting, talk to your doctor about stop-smoking programs and medicines. These can increase your chances of quitting for good. ? · Eat a heart-healthy diet. ? · Stay at a healthy weight. Lose weight if you need to.  
? · Limit alcohol to 2 drinks a day for men and 1 drink a day for women. Too much alcohol can cause health problems. ? · Avoid colds and flu. Get a pneumococcal vaccine shot. If you have had one before, ask your doctor whether you need another dose. Get a flu shot every year. If you must be around people with colds or flu, wash your hands often. Activity ? · If your doctor recommends it, get more exercise. Walking is a good choice. Bit by bit, increase the amount you walk every day. Try for at least 30 minutes on most days of the week. You also may want to swim, bike, or do other activities.  Your doctor may suggest that you join a cardiac rehabilitation program so that you can have help increasing your physical activity safely. ? · Start light exercise if your doctor says it is okay. Even a small amount will help you get stronger, have more energy, and manage stress. Walking is an easy way to get exercise. Start out by walking a little more than you did in the hospital. Gradually increase the amount you walk. ? · When you exercise, watch for signs that your heart is working too hard. You are pushing too hard if you cannot talk while you are exercising. If you become short of breath or dizzy or have chest pain, sit down and rest immediately. ? · Check your pulse regularly. Place two fingers on the artery at the palm side of your wrist, in line with your thumb. If your heartbeat seems uneven or fast, talk to your doctor. When should you call for help? Call 911 anytime you think you may need emergency care. For example, call if: 
? · You have symptoms of a heart attack. These may include: ¨ Chest pain or pressure, or a strange feeling in the chest. 
¨ Sweating. ¨ Shortness of breath. ¨ Nausea or vomiting. ¨ Pain, pressure, or a strange feeling in the back, neck, jaw, or upper belly or in one or both shoulders or arms. ¨ Lightheadedness or sudden weakness. ¨ A fast or irregular heartbeat. After you call 911, the  may tell you to chew 1 adult-strength or 2 to 4 low-dose aspirin. Wait for an ambulance. Do not try to drive yourself. ? · You have symptoms of a stroke. These may include: 
¨ Sudden numbness, tingling, weakness, or loss of movement in your face, arm, or leg, especially on only one side of your body. ¨ Sudden vision changes. ¨ Sudden trouble speaking. ¨ Sudden confusion or trouble understanding simple statements. ¨ Sudden problems with walking or balance. ¨ A sudden, severe headache that is different from past headaches. ? · You passed out (lost consciousness). ?Call your doctor now or seek immediate medical care if: 
? · You have new or increased shortness of breath. ? · You feel dizzy or lightheaded, or you feel like you may faint. ? · Your heart rate becomes irregular. ? · You can feel your heart flutter in your chest or skip heartbeats. Tell your doctor if these symptoms are new or worse. ? Watch closely for changes in your health, and be sure to contact your doctor if you have any problems. Where can you learn more? Go to http://nacho-mathew.info/. Enter U020 in the search box to learn more about \"Atrial Fibrillation: Care Instructions. \" Current as of: September 21, 2016 Content Version: 11.4 © 3397-8840 [employer]. Care instructions adapted under license by [employer] (which disclaims liability or warranty for this information). If you have questions about a medical condition or this instruction, always ask your healthcare professional. [place] [address] any warranty or liability for your use of this information. Atrial Fibrillation: Care Instructions Your Care Instructions Atrial fibrillation is an irregular and often fast heartbeat. Treating this condition is important for several reasons. It can cause blood clots, which can travel from your heart to your brain and cause a stroke. If you have a fast heartbeat, you may feel lightheaded, dizzy, and weak. An irregular heartbeat can also increase your risk for heart failure. Atrial fibrillation is often the result of another heart condition, such as high blood pressure or coronary artery disease. Making changes to improve your heart condition will help you stay healthy and active. Follow-up care is a key part of your treatment and safety. Be sure to make and go to all appointments, and call your doctor if you are having problems. It's also a good idea to know your test results and keep a list of the medicines you take. How can you care for yourself at home? Medicines ? · Take your medicines exactly as prescribed. Call your doctor if you think you are having a problem with your medicine. You will get more details on the specific medicines your doctor prescribes. ? · If your doctor has given you a blood thinner to prevent a stroke, be sure you get instructions about how to take your medicine safely. Blood thinners can cause serious bleeding problems. ? · Do not take any vitamins, over-the-counter drugs, or herbal products without talking to your doctor first. ? Lifestyle changes ? · Do not smoke. Smoking can increase your chance of a stroke and heart attack. If you need help quitting, talk to your doctor about stop-smoking programs and medicines. These can increase your chances of quitting for good. ? · Eat a heart-healthy diet. ? · Stay at a healthy weight. Lose weight if you need to.  
? · Limit alcohol to 2 drinks a day for men and 1 drink a day for women. Too much alcohol can cause health problems. ? · Avoid colds and flu. Get a pneumococcal vaccine shot. If you have had one before, ask your doctor whether you need another dose. Get a flu shot every year. If you must be around people with colds or flu, wash your hands often. Activity ? · If your doctor recommends it, get more exercise. Walking is a good choice. Bit by bit, increase the amount you walk every day. Try for at least 30 minutes on most days of the week. You also may want to swim, bike, or do other activities. Your doctor may suggest that you join a cardiac rehabilitation program so that you can have help increasing your physical activity safely. ? · Start light exercise if your doctor says it is okay. Even a small amount will help you get stronger, have more energy, and manage stress. Walking is an easy way to get exercise. Start out by walking a little more than you did in the hospital. Gradually increase the amount you walk. ? · When you exercise, watch for signs that your heart is working too hard. You are pushing too hard if you cannot talk while you are exercising. If you become short of breath or dizzy or have chest pain, sit down and rest immediately. ? · Check your pulse regularly. Place two fingers on the artery at the palm side of your wrist, in line with your thumb. If your heartbeat seems uneven or fast, talk to your doctor. When should you call for help? Call 911 anytime you think you may need emergency care. For example, call if: 
? · You have symptoms of a heart attack. These may include: ¨ Chest pain or pressure, or a strange feeling in the chest. 
¨ Sweating. ¨ Shortness of breath. ¨ Nausea or vomiting. ¨ Pain, pressure, or a strange feeling in the back, neck, jaw, or upper belly or in one or both shoulders or arms. ¨ Lightheadedness or sudden weakness. ¨ A fast or irregular heartbeat. After you call 911, the  may tell you to chew 1 adult-strength or 2 to 4 low-dose aspirin. Wait for an ambulance. Do not try to drive yourself. ? · You have symptoms of a stroke. These may include: 
¨ Sudden numbness, tingling, weakness, or loss of movement in your face, arm, or leg, especially on only one side of your body. ¨ Sudden vision changes. ¨ Sudden trouble speaking. ¨ Sudden confusion or trouble understanding simple statements. ¨ Sudden problems with walking or balance. ¨ A sudden, severe headache that is different from past headaches. ? · You passed out (lost consciousness). ?Call your doctor now or seek immediate medical care if: 
? · You have new or increased shortness of breath. ? · You feel dizzy or lightheaded, or you feel like you may faint. ? · Your heart rate becomes irregular. ? · You can feel your heart flutter in your chest or skip heartbeats. Tell your doctor if these symptoms are new or worse. ? Watch closely for changes in your health, and be sure to contact your doctor if you have any problems. Where can you learn more? Go to http://nacho-mathew.info/. Enter U020 in the search box to learn more about \"Atrial Fibrillation: Care Instructions. \" Current as of: September 21, 2016 Content Version: 11.4 © 7083-7400 [employer]. Care instructions adapted under license by [employer] (which disclaims liability or warranty for this information). If you have questions about a medical condition or this instruction, always ask your healthcare professional. [address] any warranty or liability for your use of this information. Atrial Fibrillation: Care Instructions Your Care Instructions Atrial fibrillation is an irregular and often fast heartbeat. Treating this condition is important for several reasons. It can cause blood clots, which can travel from your heart to your brain and cause a stroke. If you have a fast heartbeat, you may feel lightheaded, dizzy, and weak. An irregular heartbeat can also increase your risk for heart failure. Atrial fibrillation is often the result of another heart condition, such as high blood pressure or coronary artery disease. Making changes to improve your heart condition will help you stay healthy and active. Follow-up care is a key part of your treatment and safety. Be sure to make and go to all appointments, and call your doctor if you are having problems. It's also a good idea to know your test results and keep a list of the medicines you take. How can you care for yourself at home? Medicines ? · Take your medicines exactly as prescribed. Call your doctor if you think you are having a problem with your medicine. You will get more details on the specific medicines your doctor prescribes. ? · If your doctor has given you a blood thinner to prevent a stroke, be sure you get instructions about how to take your medicine safely.  Blood thinners can cause serious bleeding problems. ? · Do not take any vitamins, over-the-counter drugs, or herbal products without talking to your doctor first. ? Lifestyle changes ? · Do not smoke. Smoking can increase your chance of a stroke and heart attack. If you need help quitting, talk to your doctor about stop-smoking programs and medicines. These can increase your chances of quitting for good. ? · Eat a heart-healthy diet. ? · Stay at a healthy weight. Lose weight if you need to.  
? · Limit alcohol to 2 drinks a day for men and 1 drink a day for women. Too much alcohol can cause health problems. ? · Avoid colds and flu. Get a pneumococcal vaccine shot. If you have had one before, ask your doctor whether you need another dose. Get a flu shot every year. If you must be around people with colds or flu, wash your hands often. Activity ? · If your doctor recommends it, get more exercise. Walking is a good choice. Bit by bit, increase the amount you walk every day. Try for at least 30 minutes on most days of the week. You also may want to swim, bike, or do other activities. Your doctor may suggest that you join a cardiac rehabilitation program so that you can have help increasing your physical activity safely. ? · Start light exercise if your doctor says it is okay. Even a small amount will help you get stronger, have more energy, and manage stress. Walking is an easy way to get exercise. Start out by walking a little more than you did in the hospital. Gradually increase the amount you walk. ? · When you exercise, watch for signs that your heart is working too hard. You are pushing too hard if you cannot talk while you are exercising. If you become short of breath or dizzy or have chest pain, sit down and rest immediately. ? · Check your pulse regularly. Place two fingers on the artery at the palm side of your wrist, in line with your thumb.  If your heartbeat seems uneven or fast, talk to your doctor. When should you call for help? Call 911 anytime you think you may need emergency care. For example, call if: 
? · You have symptoms of a heart attack. These may include: ¨ Chest pain or pressure, or a strange feeling in the chest. 
¨ Sweating. ¨ Shortness of breath. ¨ Nausea or vomiting. ¨ Pain, pressure, or a strange feeling in the back, neck, jaw, or upper belly or in one or both shoulders or arms. ¨ Lightheadedness or sudden weakness. ¨ A fast or irregular heartbeat. After you call 911, the  may tell you to chew 1 adult-strength or 2 to 4 low-dose aspirin. Wait for an ambulance. Do not try to drive yourself. ? · You have symptoms of a stroke. These may include: 
¨ Sudden numbness, tingling, weakness, or loss of movement in your face, arm, or leg, especially on only one side of your body. ¨ Sudden vision changes. ¨ Sudden trouble speaking. ¨ Sudden confusion or trouble understanding simple statements. ¨ Sudden problems with walking or balance. ¨ A sudden, severe headache that is different from past headaches. ? · You passed out (lost consciousness). ?Call your doctor now or seek immediate medical care if: 
? · You have new or increased shortness of breath. ? · You feel dizzy or lightheaded, or you feel like you may faint. ? · Your heart rate becomes irregular. ? · You can feel your heart flutter in your chest or skip heartbeats. Tell your doctor if these symptoms are new or worse. ? Watch closely for changes in your health, and be sure to contact your doctor if you have any problems. Where can you learn more? Go to http://nacho-mathew.info/. Enter U020 in the search box to learn more about \"Atrial Fibrillation: Care Instructions. \" Current as of: September 21, 2016 Content Version: 11.4 © 8878-0540 [employer].  Care instructions adapted under license by [address] (which disclaims liability or warranty for this information). If you have questions about a medical condition or this instruction, always ask your healthcare professional. [address] any warranty or liability for your use of this information. Atrial Fibrillation: Care Instructions Your Care Instructions Atrial fibrillation is an irregular and often fast heartbeat. Treating this condition is important for several reasons. It can cause blood clots, which can travel from your heart to your brain and cause a stroke. If you have a fast heartbeat, you may feel lightheaded, dizzy, and weak. An irregular heartbeat can also increase your risk for heart failure. Atrial fibrillation is often the result of another heart condition, such as high blood pressure or coronary artery disease. Making changes to improve your heart condition will help you stay healthy and active. Follow-up care is a key part of your treatment and safety. Be sure to make and go to all appointments, and call your doctor if you are having problems. It's also a good idea to know your test results and keep a list of the medicines you take. How can you care for yourself at home? Medicines ? · Take your medicines exactly as prescribed. Call your doctor if you think you are having a problem with your medicine. You will get more details on the specific medicines your doctor prescribes. ? · If your doctor has given you a blood thinner to prevent a stroke, be sure you get instructions about how to take your medicine safely. Blood thinners can cause serious bleeding problems. ? · Do not take any vitamins, over-the-counter drugs, or herbal products without talking to your doctor first. ? Lifestyle changes ? · Do not smoke. Smoking can increase your chance of a stroke and heart attack.  If you need help quitting, talk to your doctor about stop-smoking programs and medicines. These can increase your chances of quitting for good. ? · Eat a heart-healthy diet. ? · Stay at a healthy weight. Lose weight if you need to.  
? · Limit alcohol to 2 drinks a day for men and 1 drink a day for women. Too much alcohol can cause health problems. ? · Avoid colds and flu. Get a pneumococcal vaccine shot. If you have had one before, ask your doctor whether you need another dose. Get a flu shot every year. If you must be around people with colds or flu, wash your hands often. Activity ? · If your doctor recommends it, get more exercise. Walking is a good choice. Bit by bit, increase the amount you walk every day. Try for at least 30 minutes on most days of the week. You also may want to swim, bike, or do other activities. Your doctor may suggest that you join a cardiac rehabilitation program so that you can have help increasing your physical activity safely. ? · Start light exercise if your doctor says it is okay. Even a small amount will help you get stronger, have more energy, and manage stress. Walking is an easy way to get exercise. Start out by walking a little more than you did in the hospital. Gradually increase the amount you walk. ? · When you exercise, watch for signs that your heart is working too hard. You are pushing too hard if you cannot talk while you are exercising. If you become short of breath or dizzy or have chest pain, sit down and rest immediately. ? · Check your pulse regularly. Place two fingers on the artery at the palm side of your wrist, in line with your thumb. If your heartbeat seems uneven or fast, talk to your doctor. When should you call for help? Call 911 anytime you think you may need emergency care. For example, call if: 
? · You have symptoms of a heart attack. These may include: ¨ Chest pain or pressure, or a strange feeling in the chest. 
¨ Sweating. ¨ Shortness of breath. ¨ Nausea or vomiting. ¨ Pain, pressure, or a strange feeling in the back, neck, jaw, or upper belly or in one or both shoulders or arms. ¨ Lightheadedness or sudden weakness. ¨ A fast or irregular heartbeat. After you call 911, the  may tell you to chew 1 adult-strength or 2 to 4 low-dose aspirin. Wait for an ambulance. Do not try to drive yourself. ? · You have symptoms of a stroke. These may include: 
¨ Sudden numbness, tingling, weakness, or loss of movement in your face, arm, or leg, especially on only one side of your body. ¨ Sudden vision changes. ¨ Sudden trouble speaking. ¨ Sudden confusion or trouble understanding simple statements. ¨ Sudden problems with walking or balance. ¨ A sudden, severe headache that is different from past headaches. ? · You passed out (lost consciousness). ?Call your doctor now or seek immediate medical care if: 
? · You have new or increased shortness of breath. ? · You feel dizzy or lightheaded, or you feel like you may faint. ? · Your heart rate becomes irregular. ? · You can feel your heart flutter in your chest or skip heartbeats. Tell your doctor if these symptoms are new or worse. ? Watch closely for changes in your health, and be sure to contact your doctor if you have any problems. Where can you learn more? Go to http://nacho-mathew.info/. Enter U020 in the search box to learn more about \"Atrial Fibrillation: Care Instructions. \" Current as of: September 21, 2016 Content Version: 11.4 © 1905-4866 [employer]. Care instructions adapted under license by [employer] (which disclaims liability or warranty for this information). If you have questions about a medical condition or this instruction, always ask your healthcare professional. [place] [address] any warranty or liability for your use of this information. Atrial Fibrillation: Care Instructions Your Care Instructions Atrial fibrillation is an irregular and often fast heartbeat. Treating this condition is important for several reasons. It can cause blood clots, which can travel from your heart to your brain and cause a stroke. If you have a fast heartbeat, you may feel lightheaded, dizzy, and weak. An irregular heartbeat can also increase your risk for heart failure. Atrial fibrillation is often the result of another heart condition, such as high blood pressure or coronary artery disease. Making changes to improve your heart condition will help you stay healthy and active. Follow-up care is a key part of your treatment and safety. Be sure to make and go to all appointments, and call your doctor if you are having problems. It's also a good idea to know your test results and keep a list of the medicines you take. How can you care for yourself at home? Medicines ? · Take your medicines exactly as prescribed. Call your doctor if you think you are having a problem with your medicine. You will get more details on the specific medicines your doctor prescribes. ? · If your doctor has given you a blood thinner to prevent a stroke, be sure you get instructions about how to take your medicine safely. Blood thinners can cause serious bleeding problems. ? · Do not take any vitamins, over-the-counter drugs, or herbal products without talking to your doctor first. ? Lifestyle changes ? · Do not smoke. Smoking can increase your chance of a stroke and heart attack. If you need help quitting, talk to your doctor about stop-smoking programs and medicines. These can increase your chances of quitting for good. ? · Eat a heart-healthy diet. ? · Stay at a healthy weight. Lose weight if you need to.  
? · Limit alcohol to 2 drinks a day for men and 1 drink a day for women. Too much alcohol can cause health problems. ? · Avoid colds and flu. Get a pneumococcal vaccine shot.  If you have had one before, ask your doctor whether you need another dose. Get a flu shot every year. If you must be around people with colds or flu, wash your hands often. Activity ? · If your doctor recommends it, get more exercise. Walking is a good choice. Bit by bit, increase the amount you walk every day. Try for at least 30 minutes on most days of the week. You also may want to swim, bike, or do other activities. Your doctor may suggest that you join a cardiac rehabilitation program so that you can have help increasing your physical activity safely. ? · Start light exercise if your doctor says it is okay. Even a small amount will help you get stronger, have more energy, and manage stress. Walking is an easy way to get exercise. Start out by walking a little more than you did in the hospital. Gradually increase the amount you walk. ? · When you exercise, watch for signs that your heart is working too hard. You are pushing too hard if you cannot talk while you are exercising. If you become short of breath or dizzy or have chest pain, sit down and rest immediately. ? · Check your pulse regularly. Place two fingers on the artery at the palm side of your wrist, in line with your thumb. If your heartbeat seems uneven or fast, talk to your doctor. When should you call for help? Call 911 anytime you think you may need emergency care. For example, call if: 
? · You have symptoms of a heart attack. These may include: ¨ Chest pain or pressure, or a strange feeling in the chest. 
¨ Sweating. ¨ Shortness of breath. ¨ Nausea or vomiting. ¨ Pain, pressure, or a strange feeling in the back, neck, jaw, or upper belly or in one or both shoulders or arms. ¨ Lightheadedness or sudden weakness. ¨ A fast or irregular heartbeat. After you call 911, the  may tell you to chew 1 adult-strength or 2 to 4 low-dose aspirin. Wait for an ambulance. Do not try to drive yourself. ? · You have symptoms of a stroke. These may include: 
¨ Sudden numbness, tingling, weakness, or loss of movement in your face, arm, or leg, especially on only one side of your body. ¨ Sudden vision changes. ¨ Sudden trouble speaking. ¨ Sudden confusion or trouble understanding simple statements. ¨ Sudden problems with walking or balance. ¨ A sudden, severe headache that is different from past headaches. ? · You passed out (lost consciousness). ?Call your doctor now or seek immediate medical care if: 
? · You have new or increased shortness of breath. ? · You feel dizzy or lightheaded, or you feel like you may faint. ? · Your heart rate becomes irregular. ? · You can feel your heart flutter in your chest or skip heartbeats. Tell your doctor if these symptoms are new or worse. ? Watch closely for changes in your health, and be sure to contact your doctor if you have any problems. Where can you learn more? Go to http://nacho-mathew.info/. Enter U020 in the search box to learn more about \"Atrial Fibrillation: Care Instructions. \" Current as of: September 21, 2016 Content Version: 11.4 © 9701-7259 [employer]. Care instructions adapted under license by [employer] (which disclaims liability or warranty for this information). If you have questions about a medical condition or this instruction, always ask your healthcare professional. [place] [address] any warranty or liability for your use of this information. Chart Review Routing History No Routing History on File

## 2018-04-27 NOTE — IP AVS SNAPSHOT
Andrey Swain 57 [address] 
[phone] Patient: Janell Avendaño MRN: [id number] [facility]:94/3/0343 A check amrik indicates which time of day the medication should be taken. My Medications START taking these medications Instructions Each Dose to Equal  
 Morning Noon Evening Bedtime  
 ferrous sulfate 325 mg (65 mg iron) tablet Your last dose was: Your next dose is: Take 1 Tab by mouth daily (with breakfast). 325 mg CHANGE how you take these medications Instructions Each Dose to Equal  
 Morning Noon Evening Bedtime  
 meclizine 25 mg tablet Commonly known as:  ANTIVERT What changed:   
- when to take this 
- reasons to take this Your last dose was: Your next dose is: Take 1 Tab by mouth three (3) times daily as needed. Indications: Vertigo, dizziness 25 mg CONTINUE taking these medications Instructions Each Dose to Equal  
 Morning Noon Evening Bedtime  
 aspirin 81 mg chewable tablet Your last dose was: Your next dose is: Take 81 mg by mouth daily. 81 mg  
    
   
   
   
  
 calcium carbonate 200 mg calcium (500 mg) Chew Commonly known as:  TUMS Your last dose was: Your next dose is: Take 1 Tab by mouth every six (6) hours as needed. Indications: Heartburn 1 Tab CARDIZEM 120 mg tablet Generic drug:  dilTIAZem Your last dose was: Your next dose is: Take 120 mg by mouth daily. 120 mg  
    
   
   
   
  
 LASIX 40 mg tablet Generic drug:  furosemide Your last dose was: Your next dose is: Take  by mouth daily. lisinopril 5 mg tablet Commonly known as:  Toby Benitez Your last dose was: Your next dose is: Take 5 mg by mouth daily. 5 mg  
    
   
   
   
  
 loperamide 2 mg tablet Commonly known as:  IMMODIUM Your last dose was: Your next dose is: Take 2 mg by mouth four (4) times daily as needed for Diarrhea. Indications: Diarrhea  
 2 mg  
    
   
   
   
  
 omeprazole 40 mg capsule Commonly known as:  PRILOSEC Your last dose was: Your next dose is: Take 40 mg by mouth daily. 40 mg  
    
   
   
   
  
 VITAMIN D3 1,000 unit Cap Generic drug:  cholecalciferol Your last dose was: Your next dose is: Take 1,000 Units by mouth daily. 1000 Units ZOCOR 20 mg tablet Generic drug:  simvastatin Your last dose was: Your next dose is: Take 20 mg by mouth nightly. 20 mg Where to Get Your Medications Information on where to get these meds will be given to you by the nurse or doctor. ! Ask your nurse or doctor about these medications  
  ferrous sulfate 325 mg (65 mg iron) tablet  
 meclizine 25 mg tablet

## 2018-04-27 NOTE — ED PROVIDER NOTES
HPI Comments: 79 yo F w/ PMHx of CHF, Afib, Lung Cancer s/p resection, Kidney CA s/p resection, HTN presents w/ c/o dizziness that began around 1-2 hours ago. States she \"felt hot in the face and felt something fluttering in her chest and became dizzy\". Denies CP, focal weakness, numbness, tingling, headache, nausea, vomiting, slurred speech, facial droop, abdominal pain. States she has mild SOB and increased swelling to bilateral lower extremities. States she is visiting from [facility] to see family. States she has been compliant on all home medications. States that she has had intermittent palpitations. Cardiologist Dr. Temi Blanchard in [place]. Denies history of previous MI. Patient is a 80 y.o. female presenting with dizziness. The history is provided by the patient and a relative. No  was used. Dizziness   This is a new problem. The current episode started 1 to 2 hours ago. The problem has not changed since onset. There was no focality noted. Pertinent negatives include no focal weakness, no loss of sensation, no loss of balance, no slurred speech, no memory loss, no movement disorder, no agitation, no visual change, no auditory change, no mental status change, no unresponsiveness and no disorientation. There has been no fever. Associated symptoms include shortness of breath. Pertinent negatives include no chest pain, no vomiting, no altered mental status, no confusion, no headaches and no nausea. There were no medications administered prior to arrival.        No past medical history on file. No past surgical history on file. No family history on file. Social History     Social History    Marital status: SINGLE     Spouse name: N/A    Number of children: N/A    Years of education: N/A     Occupational History    Not on file.      Social History Main Topics    Smoking status: Not on file    Smokeless tobacco: Not on file    Alcohol use Not on file    Drug use: Not on file    Sexual activity: Not on file     Other Topics Concern    Not on file     Social History Narrative         ALLERGIES: Review of patient's allergies indicates no known allergies. Review of Systems   Constitutional: Negative for chills and fever. HENT: Negative for congestion and rhinorrhea. Respiratory: Positive for shortness of breath. Negative for cough. Cardiovascular: Positive for palpitations and leg swelling. Negative for chest pain. Gastrointestinal: Negative for abdominal pain, anal bleeding, blood in stool, diarrhea, nausea and vomiting. Genitourinary: Negative for dysuria and flank pain. Musculoskeletal: Negative for myalgias. Skin: Negative for rash. Neurological: Positive for dizziness. Negative for focal weakness, seizures, weakness, numbness, headaches and loss of balance. Psychiatric/Behavioral: Negative for agitation, confusion and memory loss. Vitals:    04/27/18 1027   BP: 113/77   Pulse: (!) 134   Resp: 18   Temp: 97.8 °F (36.6 °C)   SpO2: 99%   Weight: 54.4 kg (120 lb)   Height: 5' 5\" (1.651 m)            Physical Exam   Constitutional: She is oriented to person, place, and time. She appears well-developed. Pt sitting up in bed in NAD. HENT:   Head: Normocephalic. Mouth/Throat: Oropharynx is clear and moist.   Eyes: Conjunctivae and EOM are normal. Pupils are equal, round, and reactive to light. Neck: Normal range of motion. No JVD present. No tracheal deviation present. Cardiovascular: Normal heart sounds and intact distal pulses. Irregularly irregular. Radial pulses 2+ and equal bilaterally. Pulmonary/Chest: Effort normal and breath sounds normal.   CTAB. Abdominal: Soft. Bowel sounds are normal. There is no tenderness. There is no rebound and no guarding. Genitourinary:   Genitourinary Comments: Brown stool; guaiac negative. Musculoskeletal: Normal range of motion. She exhibits no edema or tenderness. 1+ pitting bilateral LE edema. Neurological: She is alert and oriented to person, place, and time. No cranial nerve deficit. Coordination normal.   Strength 5/5 throughout. Normal sensory exam. No facial droop. No slurred speech. Skin: Skin is warm and dry. No rash noted. No erythema. Nursing note and vitals reviewed. MDM  Number of Diagnoses or Management Options  Acute on chronic congestive heart failure, unspecified heart failure type [facility]): new and requires workup  Anemia, unspecified type: new and requires workup  Atrial fibrillation, unspecified type [facility]): new and requires workup  Hypomagnesemia: new and requires workup  Diagnosis management comments: Pt administered 0.25mg/kg Diltiazem with improvement of HR. Additionally patient administered Lasix 60mg IV + Mag Oxide given Mag level 1.4  Hospitalist consulted for admission. Amount and/or Complexity of Data Reviewed  Clinical lab tests: ordered and reviewed  Tests in the radiology section of CPT®: ordered and reviewed  Tests in the medicine section of CPT®: ordered and reviewed  Review and summarize past medical records: yes  Independent visualization of images, tracings, or specimens: yes    Risk of Complications, Morbidity, and/or Mortality  Presenting problems: moderate  Diagnostic procedures: moderate  Management options: moderate    Patient Progress  Patient progress: stable        ED Course   Comment By Time   CXR IMPRESSION:   1. Expected right chest pneumonectomy findings. 2. Nonspecific increased bandlike densities seen in the left upper lobe-no  priors for comparison. Differential for this is scarring, atypical pneumonia.  Jose Diaz MD 04/27 1213       EKG  Date/Time: 4/27/2018 11:24 AM  Performed by: Daniella Mcclendon  Authorized by: Morena Thornton     ECG reviewed by ED Physician in the absence of a cardiologist: yes    Rate:     ECG rate:  118    ECG rate assessment: tachycardic    Rhythm:     Rhythm: atrial fibrillation QRS:     QRS axis:  Normal    QRS intervals:  Normal  Conduction:     Conduction: normal    ST segments:     ST segments:  Normal  T waves:     T waves: normal               Results Include:    Recent Results (from the past 24 hour(s))   EKG, 12 LEAD, INITIAL    Collection Time: 04/27/18 10:33 AM   Result Value Ref Range    Ventricular Rate 181 BPM    Atrial Rate 375 BPM    QRS Duration 102 ms    Q-T Interval 254 ms    QTC Calculation (Bezet) 441 ms    Calculated R Axis -5 degrees    Calculated T Axis 33 degrees    Diagnosis       !! AGE AND GENDER SPECIFIC ECG ANALYSIS !!  SVT rate 181 RBBB  Marked ST abnormality, possible septal subendocardial injury  Abnormal ECG  No previous ECGs available  Confirmed by MILLY SIMS (), THOR ARNDT ([id number]) on 4/27/2018 12:20:46 PM     CBC WITH AUTOMATED DIFF    Collection Time: 04/27/18 11:37 AM   Result Value Ref Range    WBC 10.6 4.3 - 11.1 K/uL    RBC 3.09 (L) 4.05 - 5.25 M/uL    HGB 7.3 (L) 11.7 - 15.4 g/dL    HCT 25.0 (L) 35.8 - 46.3 %    MCV 80.9 79.6 - 97.8 FL    MCH 23.6 (L) 26.1 - 32.9 PG    MCHC 29.2 (L) 31.4 - 35.0 g/dL    RDW 20.4 (H) 11.9 - 14.6 %    PLATELET 810 223 - 157 K/uL    MPV 10.0 (L) 10.8 - 14.1 FL    DF AUTOMATED      NEUTROPHILS 74 43 - 78 %    LYMPHOCYTES 18 13 - 44 %    MONOCYTES 5 4.0 - 12.0 %    EOSINOPHILS 2 0.5 - 7.8 %    BASOPHILS 0 0.0 - 2.0 %    IMMATURE GRANULOCYTES 1 0.0 - 5.0 %    ABS. NEUTROPHILS 7.9 1.7 - 8.2 K/UL    ABS. LYMPHOCYTES 2.0 0.5 - 4.6 K/UL    ABS. MONOCYTES 0.5 0.1 - 1.3 K/UL    ABS. EOSINOPHILS 0.2 0.0 - 0.8 K/UL    ABS. BASOPHILS 0.0 0.0 - 0.2 K/UL    ABS. IMM.  GRANS. 0.1 0.0 - 0.5 K/UL   METABOLIC PANEL, COMPREHENSIVE    Collection Time: 04/27/18 11:37 AM   Result Value Ref Range    Sodium 141 136 - 145 mmol/L    Potassium 3.4 (L) 3.5 - 5.1 mmol/L    Chloride 105 98 - 107 mmol/L    CO2 24 21 - 32 mmol/L    Anion gap 12 7 - 16 mmol/L    Glucose 96 65 - 100 mg/dL    BUN 18 8 - 23 MG/DL    Creatinine 1.37 (H) 0.6 - 1.0 MG/DL    GFR est AA 47 (L) >60 ml/min/1.73m2    GFR est non-AA 39 (L) >60 ml/min/1.73m2    Calcium 7.9 (L) 8.3 - 10.4 MG/DL    Bilirubin, total 0.2 0.2 - 1.1 MG/DL    ALT (SGPT) 15 12 - 65 U/L    AST (SGOT) 29 15 - 37 U/L    Alk.  phosphatase 104 50 - 136 U/L    Protein, total 6.4 6.3 - 8.2 g/dL    Albumin 2.2 (L) 3.2 - 4.6 g/dL    Globulin 4.2 (H) 2.3 - 3.5 g/dL    A-G Ratio 0.5 (L) 1.2 - 3.5     BNP    Collection Time: 04/27/18 11:37 AM   Result Value Ref Range     pg/mL   MAGNESIUM    Collection Time: 04/27/18 11:37 AM   Result Value Ref Range    Magnesium 1.4 (LL) 1.8 - 2.4 mg/dL   TROPONIN I    Collection Time: 04/27/18 11:37 AM   Result Value Ref Range    Troponin-I, Qt. <0.04 0.02 - 0.05 NG/ML

## 2018-04-27 NOTE — PROGRESS NOTES
TRANSFER - IN REPORT:    Verbal report received from 0991 Frank Salmon RN(name) on Sveta Alonso  being received from ED(unit) for routine progression of care      Report consisted of patients Situation, Background, Assessment and   Recommendations(SBAR). Information from the following report(s) SBAR, Kardex, ED Summary, MAR, Recent Results and Cardiac Rhythm Afib was reviewed with the receiving nurse. Opportunity for questions and clarification was provided. Assessment completed upon patients arrival to unit and care assumed.

## 2018-04-27 NOTE — ED NOTES
TRANSFER - OUT REPORT:fo    Verbal report given to Adolfo haider Franco  being transferred to [facility] routine progression of care       Report consisted of patients Situation, Background, Assessment and   Recommendations(SBAR). Information from the following report(s) ED Summary was reviewed with the receiving nurse. Lines:   Peripheral IV 04/27/18 Right Antecubital (Active)   Site Assessment Clean, dry, & intact 4/27/2018 11:06 AM   Dressing Status Clean, dry, & intact 4/27/2018 11:06 AM        Opportunity for questions and clarification was provided.       Patient transported with:   Monitor

## 2018-04-27 NOTE — IP AVS SNAPSHOT
[facility] 
 
 
 [id number]  [address] 
[phone] Patient: David Landeros MRN: [id number] About your hospitalization You were admitted on:  April 27, 2018 You last received care in the:  [facility] 3 ICU You were discharged on:  April 28, 2018 Why you were hospitalized Your primary diagnosis was:  Atrial Fibrillation With Rapid Ventricular Response (Hcc) Your diagnoses also included:  Systolic Chf, Acute On Chronic (Hcc), Solitary Right Kidney, S/P Lobectomy Of Lung, Ckd (Chronic Kidney Disease) Stage 3, Gfr 30-59 Ml/Min, Chronic Anemia, Hypomagnesemia, Hypokalemia Follow-up Information Follow up With Details Comments Contact Info Not On File Bshsi In 1 week follow up Not On File (58) Patient has a PCP but that physician is not listed in [address] [place] [address]. Discharge Orders None A check amrik indicates which time of day the medication should be taken. My Medications START taking these medications Instructions Each Dose to Equal  
 Morning Noon Evening Bedtime  
 ferrous sulfate 325 mg (65 mg iron) tablet Your last dose was: Your next dose is: Take 1 Tab by mouth daily (with breakfast). 325 mg CHANGE how you take these medications Instructions Each Dose to Equal  
 Morning Noon Evening Bedtime  
 meclizine 25 mg tablet Commonly known as:  ANTIVERT What changed:   
- when to take this 
- reasons to take this Your last dose was: Your next dose is: Take 1 Tab by mouth three (3) times daily as needed. Indications: Vertigo, dizziness 25 mg CONTINUE taking these medications Instructions Each Dose to Equal  
 Morning Noon Evening Bedtime  
 aspirin 81 mg chewable tablet Your last dose was: Your next dose is: Take 81 mg by mouth daily. 81 mg  
    
   
   
   
  
 calcium carbonate 200 mg calcium (500 mg) Chew Commonly known as:  TUMS Your last dose was: Your next dose is: Take 1 Tab by mouth every six (6) hours as needed. Indications: Heartburn 1 Tab CARDIZEM 120 mg tablet Generic drug:  dilTIAZem Your last dose was: Your next dose is: Take 120 mg by mouth daily. 120 mg  
    
   
   
   
  
 LASIX 40 mg tablet Generic drug:  furosemide Your last dose was: Your next dose is: Take  by mouth daily. lisinopril 5 mg tablet Commonly known as:  Karn Desanctis Your last dose was: Your next dose is: Take 5 mg by mouth daily. 5 mg  
    
   
   
   
  
 loperamide 2 mg tablet Commonly known as:  IMMODIUM Your last dose was: Your next dose is: Take 2 mg by mouth four (4) times daily as needed for Diarrhea. Indications: Diarrhea  
 2 mg  
    
   
   
   
  
 omeprazole 40 mg capsule Commonly known as:  PRILOSEC Your last dose was: Your next dose is: Take 40 mg by mouth daily. 40 mg  
    
   
   
   
  
 VITAMIN D3 1,000 unit Cap Generic drug:  cholecalciferol Your last dose was: Your next dose is: Take 1,000 Units by mouth daily. 1000 Units ZOCOR 20 mg tablet Generic drug:  simvastatin Your last dose was: Your next dose is: Take 20 mg by mouth nightly. 20 mg Where to Get Your Medications Information on where to get these meds will be given to you by the nurse or doctor. ! Ask your nurse or doctor about these medications  
  ferrous sulfate 325 mg (65 mg iron) tablet  
 meclizine 25 mg tablet Discharge Instructions Follow up with Primary Care Physician in 1-2 weeks. DISCHARGE SUMMARY from Nurse PATIENT INSTRUCTIONS: 
 
 
F-face looks uneven A-arms unable to move or move unevenly S-speech slurred or non-existent T-time-call 911 as soon as signs and symptoms begin-DO NOT go Back to bed or wait to see if you get better-TIME IS BRAIN. Warning Signs of HEART ATTACK Call 911 if you have these symptoms: 
? Chest discomfort. Most heart attacks involve discomfort in the center of the chest that lasts more than a few minutes, or that goes away and comes back. It can feel like uncomfortable pressure, squeezing, fullness, or pain. ? Discomfort in other areas of the upper body. Symptoms can include pain or discomfort in one or both arms, the back, neck, jaw, or stomach. ? Shortness of breath with or without chest discomfort. ? Other signs may include breaking out in a cold sweat, nausea, or lightheadedness. Don't wait more than five minutes to call [address]! Fast action can save your life. Calling 911 is almost always the fastest way to get lifesaving treatment. Emergency Medical Services staff can begin treatment when they arrive  up to an hour sooner than if someone gets to the hospital by car. The discharge information has been reviewed with the patient. The patient verbalized understanding. Discharge medications reviewed with the patient and appropriate educational materials and side effects teaching were provided. ___________________________________________________________________________________________________________________________________ Learning About ACE Inhibitors for Heart Failure Introduction ACE (angiotensin-converting enzyme) inhibitors block an enzyme that makes blood vessels narrow. As a result, the blood vessels relax and widen. This lowers blood pressure.  These medicines also put more water and salt into the urine. This also lowers blood pressure. In heart failure, your heart does not pump as much blood as your body needs. These medicines: · Make it easier for your heart to pump. · Help reduce symptoms. · Make a heart attack or stroke less likely. Examples These medicines include: · Benazepril (Lotensin). · Lisinopril (Prinivil, Zestril). · Ramipril (Altace). This is not a complete list. 
Possible side effects Side effects may include: · Cough. · Low blood pressure. You may feel dizzy and weak. · High potassium levels. · An allergic reaction. You may have other side effects or reactions not listed here. Check the information that comes with your medicine. What to know about taking this medicine · ACE inhibitors: ¨ Are often used to treat heart failure. They may be the only medicine used if your only symptoms are feeling tired and mildly short of breath with no fluid buildup (edema). But in most other cases, they are used with other medicines. ¨ Can cause a dry cough. If the cough is bad, talk to your doctor. You may need to try a different medicine. ¨ Can relieve symptoms, improve how you feel, and make it less likely you will need to stay in a hospital. And they can reduce the risk of early death. ¨ Can cause an allergic reaction of the skin. Symptoms may range from mild swelling to painful welts. It is rare to have severe swelling that makes it hard to breathe. · Take your medicines exactly as prescribed. Call your doctor if you think you are having a problem with your medicine. · Check with your doctor or pharmacist before you use any other medicines. This includes over-the-counter medicines. Make sure your doctor knows all of the medicines, vitamins, herbal products, and supplements you take. Taking some medicines together can cause problems. · You should not take an ACE inhibitor if you are pregnant or planning to become pregnant. · You may need regular blood tests. Where can you learn more? Go to http://nacho-mathew.info/. Enter R913 in the search box to learn more about \"Learning About ACE Inhibitors for Heart Failure. \" Current as of: September 21, 2016 Content Version: 11.4 © 4235-9301 [employer]. Care instructions adapted under license by [employer] (which disclaims liability or warranty for this information). If you have questions about a medical condition or this instruction, always ask your healthcare professional. [address] any warranty or liability for your use of this information. Anemia From Heavy Bleeding: Care Instructions Your Care Instructions Anemia means that your body does not have enough red blood cells. Red blood cells carry oxygen around the body. When you have anemia, you may feel dizzy, tired, and weak. You may also feel your heart pounding. For some people, it's hard to focus and think clearly. One common cause of anemia is bleeding. Bleeding from ulcers, hemorrhoids, cancer, or other problems can cause anemia. It may also be caused by heavy menstrual periods. Your treatment may include iron pills. Iron helps your body make hemoglobin. Hemoglobin is the part of the red blood cell that carries oxygen. If you have severe anemia, you may need a blood transfusion to give you red blood cells as quickly as possible. Sometimes it takes several months to get iron levels back to normal. 
Follow-up care is a key part of your treatment and safety. Be sure to make and go to all appointments, and call your doctor if you are having problems. It's also a good idea to know your test results and keep a list of the medicines you take. How can you care for yourself at home? · Be safe with medicines. Take your medicines exactly as prescribed. Call your doctor if you think you are having a problem with your medicine. · Follow your doctor's advice about eating foods that have a lot of iron in them. These include red meat, shellfish, poultry, and eggs. They also include beans, raisins, whole-grain bread, and leafy green vegetables. · Steam your vegetables. This is the best way to prepare them if you want to get as much iron as possible. · Iron pills can cause constipation. If you take them, there are things you can do to avoid constipation. Drink plenty of fluids, eat foods with a lot of fiber, and exercise every day. When should you call for help? Call 911 anytime you think you may need emergency care. For example, call if: 
? · You passed out (lost consciousness). ? · Your stools are maroon or very bloody. ?Call your doctor now or seek immediate medical care if: 
? · You are short of breath. ? · You have new or worse bleeding. ? · You are dizzy or light-headed, or you feel like you may faint. ? Watch closely for changes in your health, and be sure to contact your doctor if: 
? · You feel weaker or more tired than usual.  
? · You do not get better as expected. Where can you learn more? Go to http://nacho-mathew.info/. Enter E266 in the search box to learn more about \"Anemia From Heavy Bleeding: Care Instructions. \" Current as of: October 13, 2016 Content Version: 11.4 © 4613-9459 [employer]. Care instructions adapted under license by [employer] (which disclaims liability or warranty for this information). If you have questions about a medical condition or this instruction, always ask your healthcare professional. [address] [place] [address] any warranty or liability for your use of this information. Anemia From Heavy Bleeding: Care Instructions Your Care Instructions Anemia means that your body does not have enough red blood cells. Red blood cells carry oxygen around the body. When you have anemia, you may feel dizzy, tired, and weak. You may also feel your heart pounding. For some people, it's hard to focus and think clearly. One common cause of anemia is bleeding. Bleeding from ulcers, hemorrhoids, cancer, or other problems can cause anemia. It may also be caused by heavy menstrual periods. Your treatment may include iron pills. Iron helps your body make hemoglobin. Hemoglobin is the part of the red blood cell that carries oxygen. If you have severe anemia, you may need a blood transfusion to give you red blood cells as quickly as possible. Sometimes it takes several months to get iron levels back to normal. 
Follow-up care is a key part of your treatment and safety. Be sure to make and go to all appointments, and call your doctor if you are having problems. It's also a good idea to know your test results and keep a list of the medicines you take. How can you care for yourself at home? · Be safe with medicines. Take your medicines exactly as prescribed. Call your doctor if you think you are having a problem with your medicine. · Follow your doctor's advice about eating foods that have a lot of iron in them. These include red meat, shellfish, poultry, and eggs. They also include beans, raisins, whole-grain bread, and leafy green vegetables. · Steam your vegetables. This is the best way to prepare them if you want to get as much iron as possible. · Iron pills can cause constipation. If you take them, there are things you can do to avoid constipation. Drink plenty of fluids, eat foods with a lot of fiber, and exercise every day. When should you call for help? Call 911 anytime you think you may need emergency care. For example, call if: 
? · You passed out (lost consciousness). ? · Your stools are maroon or very bloody. ?Call your doctor now or seek immediate medical care if: 
? · You are short of breath. ? · You have new or worse bleeding. ? · You are dizzy or light-headed, or you feel like you may faint. ? Watch closely for changes in your health, and be sure to contact your doctor if: 
? · You feel weaker or more tired than usual.  
? · You do not get better as expected. Where can you learn more? Go to http://nacho-mathew.info/. Enter E073 in the search box to learn more about \"Anemia From Heavy Bleeding: Care Instructions. \" Current as of: October 13, 2016 Content Version: 11.4 © 8835-5484 [employer]. Care instructions adapted under license by [employer] (which disclaims liability or warranty for this information). If you have questions about a medical condition or this instruction, always ask your healthcare professional. [address] any warranty or liability for your use of this information. Anemia: Care Instructions Your Care Instructions Anemia is a low level of red blood cells, which carry oxygen throughout your body. Many things can cause anemia. Lack of iron is one of the most common causes. Your body needs iron to make hemoglobin, a substance in red blood cells that carries oxygen from the lungs to your body's cells. Without enough iron, the body produces fewer and smaller red blood cells. As a result, your body's cells do not get enough oxygen, and you feel tired and weak. And you may have trouble concentrating. Bleeding is the most common cause of a lack of iron. You may have heavy menstrual bleeding or bleeding caused by conditions such as ulcers, hemorrhoids, or cancer. Regular use of aspirin or other anti-inflammatory medicines (such as ibuprofen) also can cause bleeding in some people. A lack of iron in your diet also can cause anemia, especially at times when the body needs more iron, such as during pregnancy, infancy, and the teen years. Your doctor may have prescribed iron pills.  It may take several months of treatment for your iron levels to return to normal. Your doctor also may suggest that you eat foods that are rich in iron, such as meat and beans. There are many other causes of anemia. It is not always due to a lack of iron. Finding the specific cause of your anemia will help your doctor find the right treatment for you. Follow-up care is a key part of your treatment and safety. Be sure to make and go to all appointments, and call your doctor if you are having problems. It's also a good idea to know your test results and keep a list of the medicines you take. How can you care for yourself at home? · Take your medicines exactly as prescribed. Call your doctor if you think you are having a problem with your medicine. · If your doctor recommends iron pills, take them as directed: ¨ Try to take the pills on an empty stomach about 1 hour before or 2 hours after meals. But you may need to take iron with food to avoid an upset stomach. ¨ Do not take antacids or drink milk or caffeine drinks (such as coffee, tea, or cola) at the same time or within 2 hours of the time that you take your iron. They can make it hard for your body to absorb the iron. ¨ Vitamin C (from food or supplements) helps your body absorb iron. Try taking iron pills with a glass of orange juice or some other food that is high in vitamin C, such as citrus fruits. ¨ Iron pills may cause stomach problems, such as heartburn, nausea, diarrhea, constipation, and cramps. Be sure to drink plenty of fluids, and include fruits, vegetables, and fiber in your diet each day. Iron pills often make your bowel movements dark or green. ¨ If you forget to take an iron pill, do not take a double dose of iron the next time you take a pill. ¨ Keep iron pills out of the reach of small children. An overdose of iron can be very dangerous. · Follow your doctor's advice about eating iron-rich foods.  These include red meat, shellfish, poultry, eggs, beans, raisins, whole-grain bread, and leafy green vegetables. · Steam vegetables to help them keep their iron content. When should you call for help? Call 911 anytime you think you may need emergency care. For example, call if: 
? · You have symptoms of a heart attack. These may include: ¨ Chest pain or pressure, or a strange feeling in the chest. 
¨ Sweating. ¨ Shortness of breath. ¨ Nausea or vomiting. ¨ Pain, pressure, or a strange feeling in the back, neck, jaw, or upper belly or in one or both shoulders or arms. ¨ Lightheadedness or sudden weakness. ¨ A fast or irregular heartbeat. After you call 911, the  may tell you to chew 1 adult-strength or 2 to 4 low-dose aspirin. Wait for an ambulance. Do not try to drive yourself. ? · You passed out (lost consciousness). ?Call your doctor now or seek immediate medical care if: 
? · You have new or increased shortness of breath. ? · You are dizzy or lightheaded, or you feel like you may faint. ? · Your fatigue and weakness continue or get worse. ? · You have any abnormal bleeding, such as: 
¨ Nosebleeds. ¨ Vaginal bleeding that is different (heavier, more frequent, at a different time of the month) than what you are used to. ¨ Bloody or black stools, or rectal bleeding. ¨ Bloody or pink urine. ? Watch closely for changes in your health, and be sure to contact your doctor if: 
? · You do not get better as expected. Where can you learn more? Go to http://nacho-mathew.info/. Enter R301 in the search box to learn more about \"Anemia: Care Instructions. \" Current as of: October 13, 2016 Content Version: 11.4 © 2742-4036 [employer]. Care instructions adapted under license by [employer] (which disclaims liability or warranty for this information).  If you have questions about a medical condition or this instruction, always ask your healthcare professional. [address] any warranty or liability for your use of this information. Anemia: Care Instructions Your Care Instructions Anemia is a low level of red blood cells, which carry oxygen throughout your body. Many things can cause anemia. Lack of iron is one of the most common causes. Your body needs iron to make hemoglobin, a substance in red blood cells that carries oxygen from the lungs to your body's cells. Without enough iron, the body produces fewer and smaller red blood cells. As a result, your body's cells do not get enough oxygen, and you feel tired and weak. And you may have trouble concentrating. Bleeding is the most common cause of a lack of iron. You may have heavy menstrual bleeding or bleeding caused by conditions such as ulcers, hemorrhoids, or cancer. Regular use of aspirin or other anti-inflammatory medicines (such as ibuprofen) also can cause bleeding in some people. A lack of iron in your diet also can cause anemia, especially at times when the body needs more iron, such as during pregnancy, infancy, and the teen years. Your doctor may have prescribed iron pills. It may take several months of treatment for your iron levels to return to normal. Your doctor also may suggest that you eat foods that are rich in iron, such as meat and beans. There are many other causes of anemia. It is not always due to a lack of iron. Finding the specific cause of your anemia will help your doctor find the right treatment for you. Follow-up care is a key part of your treatment and safety. Be sure to make and go to all appointments, and call your doctor if you are having problems. It's also a good idea to know your test results and keep a list of the medicines you take. How can you care for yourself at home? · Take your medicines exactly as prescribed.  Call your doctor if you think you are having a problem with your medicine. · If your doctor recommends iron pills, take them as directed: ¨ Try to take the pills on an empty stomach about 1 hour before or 2 hours after meals. But you may need to take iron with food to avoid an upset stomach. ¨ Do not take antacids or drink milk or caffeine drinks (such as coffee, tea, or cola) at the same time or within 2 hours of the time that you take your iron. They can make it hard for your body to absorb the iron. ¨ Vitamin C (from food or supplements) helps your body absorb iron. Try taking iron pills with a glass of orange juice or some other food that is high in vitamin C, such as citrus fruits. ¨ Iron pills may cause stomach problems, such as heartburn, nausea, diarrhea, constipation, and cramps. Be sure to drink plenty of fluids, and include fruits, vegetables, and fiber in your diet each day. Iron pills often make your bowel movements dark or green. ¨ If you forget to take an iron pill, do not take a double dose of iron the next time you take a pill. ¨ Keep iron pills out of the reach of small children. An overdose of iron can be very dangerous. · Follow your doctor's advice about eating iron-rich foods. These include red meat, shellfish, poultry, eggs, beans, raisins, whole-grain bread, and leafy green vegetables. · Steam vegetables to help them keep their iron content. When should you call for help? Call 911 anytime you think you may need emergency care. For example, call if: 
? · You have symptoms of a heart attack. These may include: ¨ Chest pain or pressure, or a strange feeling in the chest. 
¨ Sweating. ¨ Shortness of breath. ¨ Nausea or vomiting. ¨ Pain, pressure, or a strange feeling in the back, neck, jaw, or upper belly or in one or both shoulders or arms. ¨ Lightheadedness or sudden weakness. ¨ A fast or irregular heartbeat.  
After you call 911, the  may tell you to chew 1 adult-strength or 2 to 4 low-dose aspirin. Wait for an ambulance. Do not try to drive yourself. ? · You passed out (lost consciousness). ?Call your doctor now or seek immediate medical care if: 
? · You have new or increased shortness of breath. ? · You are dizzy or lightheaded, or you feel like you may faint. ? · Your fatigue and weakness continue or get worse. ? · You have any abnormal bleeding, such as: 
¨ Nosebleeds. ¨ Vaginal bleeding that is different (heavier, more frequent, at a different time of the month) than what you are used to. ¨ Bloody or black stools, or rectal bleeding. ¨ Bloody or pink urine. ? Watch closely for changes in your health, and be sure to contact your doctor if: 
? · You do not get better as expected. Where can you learn more? Go to http://nachoVisiKardmathew.info/. Enter R301 in the search box to learn more about \"Anemia: Care Instructions. \" Current as of: October 13, 2016 Content Version: 11.4 © 2584-6349 [employer]. Care instructions adapted under license by [employer] (which disclaims liability or warranty for this information). If you have questions about a medical condition or this instruction, always ask your healthcare professional. [address] any warranty or liability for your use of this information. Anemia: Care Instructions Your Care Instructions Anemia is a low level of red blood cells, which carry oxygen throughout your body. Many things can cause anemia. Lack of iron is one of the most common causes. Your body needs iron to make hemoglobin, a substance in red blood cells that carries oxygen from the lungs to your body's cells. Without enough iron, the body produces fewer and smaller red blood cells. As a result, your body's cells do not get enough oxygen, and you feel tired and weak. And you may have trouble concentrating. Bleeding is the most common cause of a lack of iron.  You may have heavy menstrual bleeding or bleeding caused by conditions such as ulcers, hemorrhoids, or cancer. Regular use of aspirin or other anti-inflammatory medicines (such as ibuprofen) also can cause bleeding in some people. A lack of iron in your diet also can cause anemia, especially at times when the body needs more iron, such as during pregnancy, infancy, and the teen years. Your doctor may have prescribed iron pills. It may take several months of treatment for your iron levels to return to normal. Your doctor also may suggest that you eat foods that are rich in iron, such as meat and beans. There are many other causes of anemia. It is not always due to a lack of iron. Finding the specific cause of your anemia will help your doctor find the right treatment for you. Follow-up care is a key part of your treatment and safety. Be sure to make and go to all appointments, and call your doctor if you are having problems. It's also a good idea to know your test results and keep a list of the medicines you take. How can you care for yourself at home? · Take your medicines exactly as prescribed. Call your doctor if you think you are having a problem with your medicine. · If your doctor recommends iron pills, take them as directed: ¨ Try to take the pills on an empty stomach about 1 hour before or 2 hours after meals. But you may need to take iron with food to avoid an upset stomach. ¨ Do not take antacids or drink milk or caffeine drinks (such as coffee, tea, or cola) at the same time or within 2 hours of the time that you take your iron. They can make it hard for your body to absorb the iron. ¨ Vitamin C (from food or supplements) helps your body absorb iron. Try taking iron pills with a glass of orange juice or some other food that is high in vitamin C, such as citrus fruits. ¨ Iron pills may cause stomach problems, such as heartburn, nausea, diarrhea, constipation, and cramps.  Be sure to drink plenty of fluids, and include fruits, vegetables, and fiber in your diet each day. Iron pills often make your bowel movements dark or green. ¨ If you forget to take an iron pill, do not take a double dose of iron the next time you take a pill. ¨ Keep iron pills out of the reach of small children. An overdose of iron can be very dangerous. · Follow your doctor's advice about eating iron-rich foods. These include red meat, shellfish, poultry, eggs, beans, raisins, whole-grain bread, and leafy green vegetables. · Steam vegetables to help them keep their iron content. When should you call for help? Call 911 anytime you think you may need emergency care. For example, call if: 
? · You have symptoms of a heart attack. These may include: ¨ Chest pain or pressure, or a strange feeling in the chest. 
¨ Sweating. ¨ Shortness of breath. ¨ Nausea or vomiting. ¨ Pain, pressure, or a strange feeling in the back, neck, jaw, or upper belly or in one or both shoulders or arms. ¨ Lightheadedness or sudden weakness. ¨ A fast or irregular heartbeat. After you call 911, the  may tell you to chew 1 adult-strength or 2 to 4 low-dose aspirin. Wait for an ambulance. Do not try to drive yourself. ? · You passed out (lost consciousness). ?Call your doctor now or seek immediate medical care if: 
? · You have new or increased shortness of breath. ? · You are dizzy or lightheaded, or you feel like you may faint. ? · Your fatigue and weakness continue or get worse. ? · You have any abnormal bleeding, such as: 
¨ Nosebleeds. ¨ Vaginal bleeding that is different (heavier, more frequent, at a different time of the month) than what you are used to. ¨ Bloody or black stools, or rectal bleeding. ¨ Bloody or pink urine. ? Watch closely for changes in your health, and be sure to contact your doctor if: 
? · You do not get better as expected. Where can you learn more? Go to http://violet.info/. Enter R301 in the search box to learn more about \"Anemia: Care Instructions. \" Current as of: October 13, 2016 Content Version: 11.4 © 0380-2144 [email or web address]. Care instructions adapted under license by [employer] (which disclaims liability or warranty for this information). If you have questions about a medical condition or this instruction, always ask your healthcare professional. [address] any warranty or liability for your use of this information. Atrial Fibrillation: Care Instructions Your Care Instructions Atrial fibrillation is an irregular and often fast heartbeat. Treating this condition is important for several reasons. It can cause blood clots, which can travel from your heart to your brain and cause a stroke. If you have a fast heartbeat, you may feel lightheaded, dizzy, and weak. An irregular heartbeat can also increase your risk for heart failure. Atrial fibrillation is often the result of another heart condition, such as high blood pressure or coronary artery disease. Making changes to improve your heart condition will help you stay healthy and active. Follow-up care is a key part of your treatment and safety. Be sure to make and go to all appointments, and call your doctor if you are having problems. It's also a good idea to know your test results and keep a list of the medicines you take. How can you care for yourself at home? Medicines ? · Take your medicines exactly as prescribed. Call your doctor if you think you are having a problem with your medicine. You will get more details on the specific medicines your doctor prescribes. ? · If your doctor has given you a blood thinner to prevent a stroke, be sure you get instructions about how to take your medicine safely. Blood thinners can cause serious bleeding problems.   
? · Do not take any vitamins, over-the-counter drugs, or herbal products without talking to your doctor first.  
 ?Lifestyle changes ? · Do not smoke. Smoking can increase your chance of a stroke and heart attack. If you need help quitting, talk to your doctor about stop-smoking programs and medicines. These can increase your chances of quitting for good. ? · Eat a heart-healthy diet. ? · Stay at a healthy weight. Lose weight if you need to.  
? · Limit alcohol to 2 drinks a day for men and 1 drink a day for women. Too much alcohol can cause health problems. ? · Avoid colds and flu. Get a pneumococcal vaccine shot. If you have had one before, ask your doctor whether you need another dose. Get a flu shot every year. If you must be around people with colds or flu, wash your hands often. Activity ? · If your doctor recommends it, get more exercise. Walking is a good choice. Bit by bit, increase the amount you walk every day. Try for at least 30 minutes on most days of the week. You also may want to swim, bike, or do other activities. Your doctor may suggest that you join a cardiac rehabilitation program so that you can have help increasing your physical activity safely. ? · Start light exercise if your doctor says it is okay. Even a small amount will help you get stronger, have more energy, and manage stress. Walking is an easy way to get exercise. Start out by walking a little more than you did in the hospital. Gradually increase the amount you walk. ? · When you exercise, watch for signs that your heart is working too hard. You are pushing too hard if you cannot talk while you are exercising. If you become short of breath or dizzy or have chest pain, sit down and rest immediately. ? · Check your pulse regularly. Place two fingers on the artery at the palm side of your wrist, in line with your thumb. If your heartbeat seems uneven or fast, talk to your doctor. When should you call for help? Call 911 anytime you think you may need emergency care. For example, call if: ? · You have symptoms of a heart attack. These may include: ¨ Chest pain or pressure, or a strange feeling in the chest. 
¨ Sweating. ¨ Shortness of breath. ¨ Nausea or vomiting. ¨ Pain, pressure, or a strange feeling in the back, neck, jaw, or upper belly or in one or both shoulders or arms. ¨ Lightheadedness or sudden weakness. ¨ A fast or irregular heartbeat. After you call 911, the  may tell you to chew 1 adult-strength or 2 to 4 low-dose aspirin. Wait for an ambulance. Do not try to drive yourself. ? · You have symptoms of a stroke. These may include: 
¨ Sudden numbness, tingling, weakness, or loss of movement in your face, arm, or leg, especially on only one side of your body. ¨ Sudden vision changes. ¨ Sudden trouble speaking. ¨ Sudden confusion or trouble understanding simple statements. ¨ Sudden problems with walking or balance. ¨ A sudden, severe headache that is different from past headaches. ? · You passed out (lost consciousness). ?Call your doctor now or seek immediate medical care if: 
? · You have new or increased shortness of breath. ? · You feel dizzy or lightheaded, or you feel like you may faint. ? · Your heart rate becomes irregular. ? · You can feel your heart flutter in your chest or skip heartbeats. Tell your doctor if these symptoms are new or worse. ? Watch closely for changes in your health, and be sure to contact your doctor if you have any problems. Where can you learn more? Go to http://nacho-mathew.info/. Enter U020 in the search box to learn more about \"Atrial Fibrillation: Care Instructions. \" Current as of: September 21, 2016 Content Version: 11.4 © 8819-3333 [employer]. Care instructions adapted under license by [employer] (which disclaims liability or warranty for this information).  If you have questions about a medical condition or this instruction, always ask your healthcare professional. [address] any warranty or liability for your use of this information. Atrial Fibrillation: Care Instructions Your Care Instructions Atrial fibrillation is an irregular and often fast heartbeat. Treating this condition is important for several reasons. It can cause blood clots, which can travel from your heart to your brain and cause a stroke. If you have a fast heartbeat, you may feel lightheaded, dizzy, and weak. An irregular heartbeat can also increase your risk for heart failure. Atrial fibrillation is often the result of another heart condition, such as high blood pressure or coronary artery disease. Making changes to improve your heart condition will help you stay healthy and active. Follow-up care is a key part of your treatment and safety. Be sure to make and go to all appointments, and call your doctor if you are having problems. It's also a good idea to know your test results and keep a list of the medicines you take. How can you care for yourself at home? Medicines ? · Take your medicines exactly as prescribed. Call your doctor if you think you are having a problem with your medicine. You will get more details on the specific medicines your doctor prescribes. ? · If your doctor has given you a blood thinner to prevent a stroke, be sure you get instructions about how to take your medicine safely. Blood thinners can cause serious bleeding problems. ? · Do not take any vitamins, over-the-counter drugs, or herbal products without talking to your doctor first. ? Lifestyle changes ? · Do not smoke. Smoking can increase your chance of a stroke and heart attack. If you need help quitting, talk to your doctor about stop-smoking programs and medicines. These can increase your chances of quitting for good. ? · Eat a heart-healthy diet. ? · Stay at a healthy weight. Lose weight if you need to. ? · Limit alcohol to 2 drinks a day for men and 1 drink a day for women. Too much alcohol can cause health problems. ? · Avoid colds and flu. Get a pneumococcal vaccine shot. If you have had one before, ask your doctor whether you need another dose. Get a flu shot every year. If you must be around people with colds or flu, wash your hands often. Activity ? · If your doctor recommends it, get more exercise. Walking is a good choice. Bit by bit, increase the amount you walk every day. Try for at least 30 minutes on most days of the week. You also may want to swim, bike, or do other activities. Your doctor may suggest that you join a cardiac rehabilitation program so that you can have help increasing your physical activity safely. ? · Start light exercise if your doctor says it is okay. Even a small amount will help you get stronger, have more energy, and manage stress. Walking is an easy way to get exercise. Start out by walking a little more than you did in the hospital. Gradually increase the amount you walk. ? · When you exercise, watch for signs that your heart is working too hard. You are pushing too hard if you cannot talk while you are exercising. If you become short of breath or dizzy or have chest pain, sit down and rest immediately. ? · Check your pulse regularly. Place two fingers on the artery at the palm side of your wrist, in line with your thumb. If your heartbeat seems uneven or fast, talk to your doctor. When should you call for help? Call 911 anytime you think you may need emergency care. For example, call if: 
? · You have symptoms of a heart attack. These may include: ¨ Chest pain or pressure, or a strange feeling in the chest. 
¨ Sweating. ¨ Shortness of breath. ¨ Nausea or vomiting. ¨ Pain, pressure, or a strange feeling in the back, neck, jaw, or upper belly or in one or both shoulders or arms. ¨ Lightheadedness or sudden weakness. ¨ A fast or irregular heartbeat. After you call 911, the  may tell you to chew 1 adult-strength or 2 to 4 low-dose aspirin. Wait for an ambulance. Do not try to drive yourself. ? · You have symptoms of a stroke. These may include: 
¨ Sudden numbness, tingling, weakness, or loss of movement in your face, arm, or leg, especially on only one side of your body. ¨ Sudden vision changes. ¨ Sudden trouble speaking. ¨ Sudden confusion or trouble understanding simple statements. ¨ Sudden problems with walking or balance. ¨ A sudden, severe headache that is different from past headaches. ? · You passed out (lost consciousness). ?Call your doctor now or seek immediate medical care if: 
? · You have new or increased shortness of breath. ? · You feel dizzy or lightheaded, or you feel like you may faint. ? · Your heart rate becomes irregular. ? · You can feel your heart flutter in your chest or skip heartbeats. Tell your doctor if these symptoms are new or worse. ? Watch closely for changes in your health, and be sure to contact your doctor if you have any problems. Where can you learn more? Go to http://nacho-mathew.info/. Enter U020 in the search box to learn more about \"Atrial Fibrillation: Care Instructions. \" Current as of: September 21, 2016 Content Version: 11.4 © 0792-5500 [employer]. Care instructions adapted under license by [employer] (which disclaims liability or warranty for this information). If you have questions about a medical condition or this instruction, always ask your healthcare professional. [place] [address] any warranty or liability for your use of this information. Atrial Fibrillation: Care Instructions Your Care Instructions Atrial fibrillation is an irregular and often fast heartbeat. Treating this condition is important for several reasons.  It can cause blood clots, which can travel from your heart to your brain and cause a stroke. If you have a fast heartbeat, you may feel lightheaded, dizzy, and weak. An irregular heartbeat can also increase your risk for heart failure. Atrial fibrillation is often the result of another heart condition, such as high blood pressure or coronary artery disease. Making changes to improve your heart condition will help you stay healthy and active. Follow-up care is a key part of your treatment and safety. Be sure to make and go to all appointments, and call your doctor if you are having problems. It's also a good idea to know your test results and keep a list of the medicines you take. How can you care for yourself at home? Medicines ? · Take your medicines exactly as prescribed. Call your doctor if you think you are having a problem with your medicine. You will get more details on the specific medicines your doctor prescribes. ? · If your doctor has given you a blood thinner to prevent a stroke, be sure you get instructions about how to take your medicine safely. Blood thinners can cause serious bleeding problems. ? · Do not take any vitamins, over-the-counter drugs, or herbal products without talking to your doctor first. ? Lifestyle changes ? · Do not smoke. Smoking can increase your chance of a stroke and heart attack. If you need help quitting, talk to your doctor about stop-smoking programs and medicines. These can increase your chances of quitting for good. ? · Eat a heart-healthy diet. ? · Stay at a healthy weight. Lose weight if you need to.  
? · Limit alcohol to 2 drinks a day for men and 1 drink a day for women. Too much alcohol can cause health problems. ? · Avoid colds and flu. Get a pneumococcal vaccine shot. If you have had one before, ask your doctor whether you need another dose. Get a flu shot every year. If you must be around people with colds or flu, wash your hands often. Activity ? · If your doctor recommends it, get more exercise. Walking is a good choice. Bit by bit, increase the amount you walk every day. Try for at least 30 minutes on most days of the week. You also may want to swim, bike, or do other activities. Your doctor may suggest that you join a cardiac rehabilitation program so that you can have help increasing your physical activity safely. ? · Start light exercise if your doctor says it is okay. Even a small amount will help you get stronger, have more energy, and manage stress. Walking is an easy way to get exercise. Start out by walking a little more than you did in the hospital. Gradually increase the amount you walk. ? · When you exercise, watch for signs that your heart is working too hard. You are pushing too hard if you cannot talk while you are exercising. If you become short of breath or dizzy or have chest pain, sit down and rest immediately. ? · Check your pulse regularly. Place two fingers on the artery at the palm side of your wrist, in line with your thumb. If your heartbeat seems uneven or fast, talk to your doctor. When should you call for help? Call 911 anytime you think you may need emergency care. For example, call if: 
? · You have symptoms of a heart attack. These may include: ¨ Chest pain or pressure, or a strange feeling in the chest. 
¨ Sweating. ¨ Shortness of breath. ¨ Nausea or vomiting. ¨ Pain, pressure, or a strange feeling in the back, neck, jaw, or upper belly or in one or both shoulders or arms. ¨ Lightheadedness or sudden weakness. ¨ A fast or irregular heartbeat. After you call 911, the  may tell you to chew 1 adult-strength or 2 to 4 low-dose aspirin. Wait for an ambulance. Do not try to drive yourself. ? · You have symptoms of a stroke. These may include: 
¨ Sudden numbness, tingling, weakness, or loss of movement in your face, arm, or leg, especially on only one side of your body. ¨ Sudden vision changes. ¨ Sudden trouble speaking. ¨ Sudden confusion or trouble understanding simple statements. ¨ Sudden problems with walking or balance. ¨ A sudden, severe headache that is different from past headaches. ? · You passed out (lost consciousness). ?Call your doctor now or seek immediate medical care if: 
? · You have new or increased shortness of breath. ? · You feel dizzy or lightheaded, or you feel like you may faint. ? · Your heart rate becomes irregular. ? · You can feel your heart flutter in your chest or skip heartbeats. Tell your doctor if these symptoms are new or worse. ? Watch closely for changes in your health, and be sure to contact your doctor if you have any problems. Where can you learn more? Go to http://nacho-mathew.info/. Enter U020 in the search box to learn more about \"Atrial Fibrillation: Care Instructions. \" Current as of: September 21, 2016 Content Version: 11.4 © 2448-1368 [employer]. Care instructions adapted under license by [employer] (which disclaims liability or warranty for this information). If you have questions about a medical condition or this instruction, always ask your healthcare professional. [place] [address] any warranty or liability for your use of this information. Atrial Fibrillation: Care Instructions Your Care Instructions Atrial fibrillation is an irregular and often fast heartbeat. Treating this condition is important for several reasons. It can cause blood clots, which can travel from your heart to your brain and cause a stroke. If you have a fast heartbeat, you may feel lightheaded, dizzy, and weak. An irregular heartbeat can also increase your risk for heart failure. Atrial fibrillation is often the result of another heart condition, such as high blood pressure or coronary artery disease. Making changes to improve your heart condition will help you stay healthy and active. Follow-up care is a key part of your treatment and safety. Be sure to make and go to all appointments, and call your doctor if you are having problems. It's also a good idea to know your test results and keep a list of the medicines you take. How can you care for yourself at home? Medicines ? · Take your medicines exactly as prescribed. Call your doctor if you think you are having a problem with your medicine. You will get more details on the specific medicines your doctor prescribes. ? · If your doctor has given you a blood thinner to prevent a stroke, be sure you get instructions about how to take your medicine safely. Blood thinners can cause serious bleeding problems. ? · Do not take any vitamins, over-the-counter drugs, or herbal products without talking to your doctor first. ? Lifestyle changes ? · Do not smoke. Smoking can increase your chance of a stroke and heart attack. If you need help quitting, talk to your doctor about stop-smoking programs and medicines. These can increase your chances of quitting for good. ? · Eat a heart-healthy diet. ? · Stay at a healthy weight. Lose weight if you need to.  
? · Limit alcohol to 2 drinks a day for men and 1 drink a day for women. Too much alcohol can cause health problems. ? · Avoid colds and flu. Get a pneumococcal vaccine shot. If you have had one before, ask your doctor whether you need another dose. Get a flu shot every year. If you must be around people with colds or flu, wash your hands often. Activity ? · If your doctor recommends it, get more exercise. Walking is a good choice. Bit by bit, increase the amount you walk every day. Try for at least 30 minutes on most days of the week. You also may want to swim, bike, or do other activities. Your doctor may suggest that you join a cardiac rehabilitation program so that you can have help increasing your physical activity safely. ? · Start light exercise if your doctor says it is okay. Even a small amount will help you get stronger, have more energy, and manage stress. Walking is an easy way to get exercise. Start out by walking a little more than you did in the hospital. Gradually increase the amount you walk. ? · When you exercise, watch for signs that your heart is working too hard. You are pushing too hard if you cannot talk while you are exercising. If you become short of breath or dizzy or have chest pain, sit down and rest immediately. ? · Check your pulse regularly. Place two fingers on the artery at the palm side of your wrist, in line with your thumb. If your heartbeat seems uneven or fast, talk to your doctor. When should you call for help? Call 911 anytime you think you may need emergency care. For example, call if: 
? · You have symptoms of a heart attack. These may include: ¨ Chest pain or pressure, or a strange feeling in the chest. 
¨ Sweating. ¨ Shortness of breath. ¨ Nausea or vomiting. ¨ Pain, pressure, or a strange feeling in the back, neck, jaw, or upper belly or in one or both shoulders or arms. ¨ Lightheadedness or sudden weakness. ¨ A fast or irregular heartbeat. After you call 911, the  may tell you to chew 1 adult-strength or 2 to 4 low-dose aspirin. Wait for an ambulance. Do not try to drive yourself. ? · You have symptoms of a stroke. These may include: 
¨ Sudden numbness, tingling, weakness, or loss of movement in your face, arm, or leg, especially on only one side of your body. ¨ Sudden vision changes. ¨ Sudden trouble speaking. ¨ Sudden confusion or trouble understanding simple statements. ¨ Sudden problems with walking or balance. ¨ A sudden, severe headache that is different from past headaches. ? · You passed out (lost consciousness). ?Call your doctor now or seek immediate medical care if: 
? · You have new or increased shortness of breath. ? · You feel dizzy or lightheaded, or you feel like you may faint. ? · Your heart rate becomes irregular. ? · You can feel your heart flutter in your chest or skip heartbeats. Tell your doctor if these symptoms are new or worse. ? Watch closely for changes in your health, and be sure to contact your doctor if you have any problems. Where can you learn more? Go to http://nacho-mathew.info/. Enter U020 in the search box to learn more about \"Atrial Fibrillation: Care Instructions. \" Current as of: September 21, 2016 Content Version: 11.4 © 6058-5060 [employer] Care instructions adapted under license by [employer] (which disclaims liability or warranty for this information). If you have questions about a medical condition or this instruction, always ask your healthcare professional. [address] any warranty or liability for your use of this information. Atrial Fibrillation: Care Instructions Your Care Instructions Atrial fibrillation is an irregular and often fast heartbeat. Treating this condition is important for several reasons. It can cause blood clots, which can travel from your heart to your brain and cause a stroke. If you have a fast heartbeat, you may feel lightheaded, dizzy, and weak. An irregular heartbeat can also increase your risk for heart failure. Atrial fibrillation is often the result of another heart condition, such as high blood pressure or coronary artery disease. Making changes to improve your heart condition will help you stay healthy and active. Follow-up care is a key part of your treatment and safety. Be sure to make and go to all appointments, and call your doctor if you are having problems. It's also a good idea to know your test results and keep a list of the medicines you take. How can you care for yourself at home? Medicines ? · Take your medicines exactly as prescribed.  Call your doctor if you think you are having a problem with your medicine. You will get more details on the specific medicines your doctor prescribes. ? · If your doctor has given you a blood thinner to prevent a stroke, be sure you get instructions about how to take your medicine safely. Blood thinners can cause serious bleeding problems. ? · Do not take any vitamins, over-the-counter drugs, or herbal products without talking to your doctor first. ? Lifestyle changes ? · Do not smoke. Smoking can increase your chance of a stroke and heart attack. If you need help quitting, talk to your doctor about stop-smoking programs and medicines. These can increase your chances of quitting for good. ? · Eat a heart-healthy diet. ? · Stay at a healthy weight. Lose weight if you need to.  
? · Limit alcohol to 2 drinks a day for men and 1 drink a day for women. Too much alcohol can cause health problems. ? · Avoid colds and flu. Get a pneumococcal vaccine shot. If you have had one before, ask your doctor whether you need another dose. Get a flu shot every year. If you must be around people with colds or flu, wash your hands often. Activity ? · If your doctor recommends it, get more exercise. Walking is a good choice. Bit by bit, increase the amount you walk every day. Try for at least 30 minutes on most days of the week. You also may want to swim, bike, or do other activities. Your doctor may suggest that you join a cardiac rehabilitation program so that you can have help increasing your physical activity safely. ? · Start light exercise if your doctor says it is okay. Even a small amount will help you get stronger, have more energy, and manage stress. Walking is an easy way to get exercise. Start out by walking a little more than you did in the hospital. Gradually increase the amount you walk. ? · When you exercise, watch for signs that your heart is working too hard.  You are pushing too hard if you cannot talk while you are exercising. If you become short of breath or dizzy or have chest pain, sit down and rest immediately. ? · Check your pulse regularly. Place two fingers on the artery at the palm side of your wrist, in line with your thumb. If your heartbeat seems uneven or fast, talk to your doctor. When should you call for help? Call 911 anytime you think you may need emergency care. For example, call if: 
? · You have symptoms of a heart attack. These may include: ¨ Chest pain or pressure, or a strange feeling in the chest. 
¨ Sweating. ¨ Shortness of breath. ¨ Nausea or vomiting. ¨ Pain, pressure, or a strange feeling in the back, neck, jaw, or upper belly or in one or both shoulders or arms. ¨ Lightheadedness or sudden weakness. ¨ A fast or irregular heartbeat. After you call 911, the  may tell you to chew 1 adult-strength or 2 to 4 low-dose aspirin. Wait for an ambulance. Do not try to drive yourself. ? · You have symptoms of a stroke. These may include: 
¨ Sudden numbness, tingling, weakness, or loss of movement in your face, arm, or leg, especially on only one side of your body. ¨ Sudden vision changes. ¨ Sudden trouble speaking. ¨ Sudden confusion or trouble understanding simple statements. ¨ Sudden problems with walking or balance. ¨ A sudden, severe headache that is different from past headaches. ? · You passed out (lost consciousness). ?Call your doctor now or seek immediate medical care if: 
? · You have new or increased shortness of breath. ? · You feel dizzy or lightheaded, or you feel like you may faint. ? · Your heart rate becomes irregular. ? · You can feel your heart flutter in your chest or skip heartbeats. Tell your doctor if these symptoms are new or worse. ? Watch closely for changes in your health, and be sure to contact your doctor if you have any problems. Where can you learn more? Go to http://violet.info/. Enter U020 in the search box to learn more about \"Atrial Fibrillation: Care Instructions. \" Current as of: September 21, 2016 Content Version: 11.4 © 8663-0123 [employer]. Care instructions adapted under license by [employer] (which disclaims liability or warranty for this information). If you have questions about a medical condition or this instruction, always ask your healthcare professional. [address] any warranty or liability for your use of this information. Codesion Announcement We are excited to announce that we are making your provider's discharge notes available to you in Codesion. You will see these notes when they are completed and signed by the physician that discharged you from your recent hospital stay. If you have any questions or concerns about any information you see in Codesion, please call the Health Information Department where you were seen or reach out to your Primary Care Provider for more information about your plan of care. Introducing [facility]! Ronald De Guzman introduces Codesion patient portal. Now you can access parts of your medical record, email your doctor's office, and request medication refills online. 1. In your internet browser, go to [email or web address] 2. Click on the First Time User? Click Here link in the Sign In box. You will see the New Member Sign Up page. 3. Enter your Codesion Access Code exactly as it appears below. You will not need to use this code after youve completed the sign-up process. If you do not sign up before the expiration date, you must request a new code. · Codesion Access Code: [email or web address] Expires: 7/26/2018 10:26 AM 
 
4. Enter the last four digits of your Social Security Number (xxxx) and Date of Birth (mm/dd/yyyy) as indicated and click Submit. You will be taken to the next sign-up page. 5. Create a "[employer] ID. This will be your "[employer] login ID and cannot be changed, so think of one that is secure and easy to remember. 6. Create a "[employer] password. You can change your password at any time. 7. Enter your Password Reset Question and Answer. This can be used at a later time if you forget your password. 8. Enter your e-mail address. You will receive e-mail notification when new information is available in [facility] 9. Click Sign Up. You can now view and download portions of your medical record. 10. Click the Download Summary menu link to download a portable copy of your medical information. If you have questions, please visit the Frequently Asked Questions section of the "[employer] website. Remember, "[employer] is NOT to be used for urgent needs. For medical emergencies, dial 911. Now available from your iPhone and Android! Introducing Aleksandr Bradshaw As a [facility] patient, I wanted to make you aware of our electronic visit tool called Aleksandr Bradshaw. [facility] 24/7 allows you to connect within minutes with a medical provider 24 hours a day, seven days a week via a mobile device or tablet or logging into a secure website from your computer. You can access Aleksnadr Bradshaw from anywhere in the [place]. A virtual visit might be right for you when you have a simple condition and feel like you just dont want to get out of bed, or cant get away from work for an appointment, when your regular [facility] provider is not available (evenings, weekends or holidays), or when youre out of town and need minor care. Electronic visits cost only $49 and if the [facility] 24/7 provider determines a prescription is needed to treat your condition, one can be electronically transmitted to a nearby pharmacy*. Please take a moment to enroll today if you have not already done so. The enrollment process is free and takes just a few minutes.   To enroll, please download the [address] [place] [address] 24/7 kathy to your tablet or phone, or visit [email or web address] to enroll on your computer. And, as an [address] [place] [address] patient with a [employer] account, the results of your visits will be scanned into your electronic medical record and your primary care provider will be able to view the scanned results. We urge you to continue to see your regular [address] [place] [address] provider for your ongoing medical care. And while your primary care provider may not be the one available when you seek a [employer] virtual visit, the peace of mind you get from getting a real diagnosis real time can be priceless. For more information on [employer], view our Frequently Asked Questions (FAQs) at [email or web address]. Sincerely, 
 
Rosey Nichols MD 
Chief Medical Officer [facility] Alondra Mccoy *:  certain medications cannot be prescribed via [employer] Providers Seen During Your Hospitalization Provider Specialty Primary office phone Denny Montelongo MD Emergency Medicine [phone] Will Maurer MD Internal Medicine [phone] Immunizations Administered for This Admission Name Date  
 TB Skin Test (PPD) Intradermal 4/27/2018 Your Primary Care Physician (PCP) Primary Care Physician Office Phone Office Fax NOT ON FILE ** None ** ** None ** You are allergic to the following No active allergies Recent Documentation Height Weight Breastfeeding? BMI Smoking Status 1.664 m 56.3 kg No 20.34 kg/m2 Former Smoker Emergency Contacts Name Discharge Info Relation Home Work Mobile Grecia Slaughter  Daughter [21]   [phone] Patient Belongings  The following personal items are in your possession at time of discharge: 
  Dental Appliances: Uppers, Lowers, With patient  Visual Aid: Glasses      Home Medications: Sent home   Jewelry: Ring, Watch, Sent home  Clothing: Footwear, Shirt, Pants, Socks, Undergarments, Sent home    Other Valuables: None  Personal Items Sent to Safe: none Please provide this summary of care documentation to your next provider. Signatures-by signing, you are acknowledging that this After Visit Summary has been reviewed with you and you have received a copy. Patient Signature:  ____________________________________________________________ Date:  ____________________________________________________________  
  
Elisa Spare Provider Signature:  ____________________________________________________________ Date:  ____________________________________________________________

## 2018-04-27 NOTE — PROGRESS NOTES
CM called patient's PCP's office, was transferred to an unnamed individual in scheduling. CM explained that the patient would benefit from PT through [facility], and advised that a referral was supposed to be sent but the patient reports she has not been contacted. CM was told that CM would need to be added as an emergency contact to communicate with the physician or his nurse. CM defined continuity of care and the  transferred CM to the . CM left a voicemail. CM called patient's daughter James Green and explained that the CM left a message for the . CM advised Faheem Oro that if the CM does not hear from her mother's PCP's office then she needs to advocate to ensure that referral for PT is made.

## 2018-04-27 NOTE — PROGRESS NOTES
CM met with patient and her daughter Skip Downing [phone]). Patient states that she lives alone in an apartment in [place], but her grandson visits \"everyday. \" Patient states that she's independent in her ADLs and ambulates with a cane. CM asked if the patient felt stable with the cane, and she states that she feels she would benefit from a walker (requested [employer]). CM asked if the patient has had any PT. Patient states that she has, but it was 5 year ago. Patient states that she would be interested in having PT in the home due to difficulty leaving for outpatient. Patient states that her PCP is Dr. Александр Cruz MD in [place]. CM offered to call Dr. Shaheen Marshall [facility] in the [facility], and the patient agreed. CM advised that she would also discuss DME needs. Phone number for patient is her home phone. CM provided with the patient's daughter's phone who lives in [place]. Patient states that it's okay to call her daughter with any information.

## 2018-04-27 NOTE — PROGRESS NOTES
Received patient to room 371. Dual skin assessment done with Esteabn Dodson RN. Callus noted on bottom of left foot. Allevyn applied to bony sacral area. No skin breakdown noted.

## 2018-04-27 NOTE — ED TRIAGE NOTES
Patient complaining of feeling dizzy starting last night. Patient denies any pain at this time. Heart rate initially in 180s.

## 2018-04-27 NOTE — H&P
Hospitalist H&P Note     Admit Date:  2018 10:21 AM   Name:  Derick Asher   Age:  80 y.o.  :  1932   MRN:  [id number]   PCP:  Not On File Bsi  Treatment Team: Attending Provider: Jo Ann Young MD; Care Manager: Dahiana Boone    HPI:       Ms. Tera Anton is a 81 yo female with PMH of right lobectomy and left nephrectomy performed at [facility] in [address] [place] [address], [address] [place] [address] due to anemia and fall risk, sCHF followed by cardio in [address] [place] [address] evaluated for persistent dizziness and near syncope. She is visiting her daughter locally and has felt unwell as of today with increased warmth, palpitations. She was found to have afib with RVR in the ED and is on IV cardizem drip. Additionally she has received lasix 60 mg IV once. CXR shows right lobectomy and band like density of CLAIR. In discussion with her daughter Chencho Conte at bedside, she is followed regularly with [facility], in [address] [place] [address] and s/p CT chest / ABD/ pelvis  that I personally was able to review in her my chart showing known CLAIR scarring/ nodules. Additionally she had a CTAP that showed chronic issues being followed as well without evidence of etiology for her known anemia. Her HGB 2018 was 6.9 and she recalls receiving blood transfusion then. She is not currently followed by GI (states not a good scope candidate) nor hematology. Currently HGB 7.3 and denies bethany GI blood loss. She denies bethany fever, no chills, no dyspnea or cough, no chest pain, no anorexia, no emesis or nausea. 10 systems reviewed and negative except as noted in HPI.  Left vision loss, ear popping, skin changes, arthritis, rare headaches, some hand paresthesia, increase BM changes, edema,              Past Medical History:   Diagnosis Date    Arthritis     Asthma     CAD (coronary artery disease)     Cancer ([facility]     Chronic kidney disease     kidney removed    Heart failure ([facility] [facility]     Hypertension     Psychiatric disorder     depresion    PUD (peptic ulcer disease)     acid reflux      Past Surgical History:   Procedure Laterality Date    HX HEENT      eye surgery    HX OTHER SURGICAL      lung    HX UROLOGICAL      hand surgery         No Known Allergies   Social History   Substance Use Topics    Smoking status: Former Smoker    Smokeless tobacco: Never Used    Alcohol use No      family history- HTN, colon cancer   There is no immunization history for the selected administration types on file for this patient. PTA Medications:  Prior to Admission Medications   Prescriptions Last Dose Informant Patient Reported? Taking?   dilTIAZem (CARDIZEM) 120 mg tablet   Yes Yes   Sig: Take 120 mg by mouth daily. furosemide (LASIX) 40 mg tablet   Yes Yes   Sig: Take  by mouth daily. lisinopril (PRINIVIL, ZESTRIL) 5 mg tablet   Yes Yes   Sig: Take 5 mg by mouth daily. omeprazole (PRILOSEC) 40 mg capsule   Yes Yes   Sig: Take 40 mg by mouth daily. simvastatin (ZOCOR) 20 mg tablet   Yes Yes   Sig: Take 20 mg by mouth nightly.       Facility-Administered Medications: None       Objective:     Patient Vitals for the past 24 hrs:   Temp Pulse Resp BP SpO2   04/27/18 1403 - 99 30 134/75 (!) 75 %   04/27/18 1301 - 91 14 154/83 96 %   04/27/18 1230 - (!) 105 20 142/77 99 %   04/27/18 1119 - 89 15 - 97 %   04/27/18 1114 - (!) 108 9 - 99 %   04/27/18 1110 - 96 14 - 98 %   04/27/18 1106 - 94 16 - 98 %   04/27/18 1105 - (!) 109 17 - -   04/27/18 1053 - (!) 188 - 150/68 -   04/27/18 1048 - (!) 104 22 150/68 -   04/27/18 1027 97.8 °F (36.6 °C) (!) 134 18 113/77 99 %   04/27/18 1024 - (!) 185 - - 91 %   04/27/18 1023 - - - 113/77 -     Oxygen Therapy  O2 Sat (%): (!) 75 % (04/27/18 1403)  Pulse via Oximetry: 113 beats per minute (04/27/18 1403)  O2 Device: Room air (04/27/18 1230)    Intake/Output Summary (Last 24 hours) at 04/27/18 1428  Last data filed at 04/27/18 1406   Gross per 24 hour   Intake                0 ml   Output              600 ml   Net             -600 ml Physical Exam:  General:    Well nourished. Alert. 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 Moist mucous membranes, TM cloudy with posterior fluid   CV:   Slightly tachycardic with irregular rhythm, trace pedal edema   Lungs:  Decreased right lung field otherwise clear   Abdomen: Soft, nontender, nondistended. Decreased BS   Extremities: Warm and dry. No cyanosis or edema. Neurologic: grossly intact  Skin:     No rashes or jaundice. Normal coloration  Psych:  Normal mood and affect. I reviewed the labs, imaging, EKGs, telemetry, and other studies done this admission. Data Review:   Recent Results (from the past 24 hour(s))   EKG, 12 LEAD, INITIAL    Collection Time: 04/27/18 10:33 AM   Result Value Ref Range    Ventricular Rate 181 BPM    Atrial Rate 375 BPM    QRS Duration 102 ms    Q-T Interval 254 ms    QTC Calculation (Bezet) 441 ms    Calculated R Axis -5 degrees    Calculated T Axis 33 degrees    Diagnosis       !! AGE AND GENDER SPECIFIC ECG ANALYSIS !!  SVT rate 181 RBBB  Marked ST abnormality, possible septal subendocardial injury  Abnormal ECG  No previous ECGs available  Confirmed by MILLY SIMS (), THOR ARNDT ([id number]) on 4/27/2018 12:20:46 PM     CBC WITH AUTOMATED DIFF    Collection Time: 04/27/18 11:37 AM   Result Value Ref Range    WBC 10.6 4.3 - 11.1 K/uL    RBC 3.09 (L) 4.05 - 5.25 M/uL    HGB 7.3 (L) 11.7 - 15.4 g/dL    HCT 25.0 (L) 35.8 - 46.3 %    MCV 80.9 79.6 - 97.8 FL    MCH 23.6 (L) 26.1 - 32.9 PG    MCHC 29.2 (L) 31.4 - 35.0 g/dL    RDW 20.4 (H) 11.9 - 14.6 %    PLATELET 139 414 - 645 K/uL    MPV 10.0 (L) 10.8 - 14.1 FL    DF AUTOMATED      NEUTROPHILS 74 43 - 78 %    LYMPHOCYTES 18 13 - 44 %    MONOCYTES 5 4.0 - 12.0 %    EOSINOPHILS 2 0.5 - 7.8 %    BASOPHILS 0 0.0 - 2.0 %    IMMATURE GRANULOCYTES 1 0.0 - 5.0 %    ABS. NEUTROPHILS 7.9 1.7 - 8.2 K/UL    ABS. LYMPHOCYTES 2.0 0.5 - 4.6 K/UL    ABS.  MONOCYTES 0.5 0.1 - 1.3 K/UL    ABS. EOSINOPHILS 0.2 0.0 - 0.8 K/UL    ABS. BASOPHILS 0.0 0.0 - 0.2 K/UL    ABS. IMM. GRANS. 0.1 0.0 - 0.5 K/UL   METABOLIC PANEL, COMPREHENSIVE    Collection Time: 04/27/18 11:37 AM   Result Value Ref Range    Sodium 141 136 - 145 mmol/L    Potassium 3.4 (L) 3.5 - 5.1 mmol/L    Chloride 105 98 - 107 mmol/L    CO2 24 21 - 32 mmol/L    Anion gap 12 7 - 16 mmol/L    Glucose 96 65 - 100 mg/dL    BUN 18 8 - 23 MG/DL    Creatinine 1.37 (H) 0.6 - 1.0 MG/DL    GFR est AA 47 (L) >60 ml/min/1.73m2    GFR est non-AA 39 (L) >60 ml/min/1.73m2    Calcium 7.9 (L) 8.3 - 10.4 MG/DL    Bilirubin, total 0.2 0.2 - 1.1 MG/DL    ALT (SGPT) 15 12 - 65 U/L    AST (SGOT) 29 15 - 37 U/L    Alk. phosphatase 104 50 - 136 U/L    Protein, total 6.4 6.3 - 8.2 g/dL    Albumin 2.2 (L) 3.2 - 4.6 g/dL    Globulin 4.2 (H) 2.3 - 3.5 g/dL    A-G Ratio 0.5 (L) 1.2 - 3.5     BNP    Collection Time: 04/27/18 11:37 AM   Result Value Ref Range     pg/mL   MAGNESIUM    Collection Time: 04/27/18 11:37 AM   Result Value Ref Range    Magnesium 1.4 (LL) 1.8 - 2.4 mg/dL   TROPONIN I    Collection Time: 04/27/18 11:37 AM   Result Value Ref Range    Troponin-I, Qt. <0.04 0.02 - 0.05 NG/ML   TRANSFERRIN SATURATION    Collection Time: 04/27/18 11:37 AM   Result Value Ref Range    Iron 27 (L) 35 - 150 ug/dL    TIBC 470 (H) 250 - 450 ug/dL    Transferrin Saturation 6 (L) >20 %   FERRITIN    Collection Time: 04/27/18 11:37 AM   Result Value Ref Range    Ferritin 22 8 - 388 NG/ML   TSH 3RD GENERATION    Collection Time: 04/27/18 11:37 AM   Result Value Ref Range    TSH 2.758 0.358 - 3.740 uIU/mL       All Micro Results     None          Other Studies:  Xr Chest Sngl V    Result Date: 4/27/2018  1 View portable chest x-ray 4/27/2018 10:59 AM Indication: Shortness of breath and tachycardia. Patient has had prior right pneumonectomy. Comparison: None available at this hospital PACS system Findings:  This portable erect AP chest at 1100 hours shows the patient with right pneumonectomy changes, expected volume loss with shift of the cardiac and mediastinal contours to the right is seen. This hemithorax is completely opaque-expected finding for this. Monitoring leads overlie the chest. Grossly-the heart is not markedly enlarged but it cannot be characterized well on this exam otherwise. The left lung is well expanded. Vascularity is appropriate. Some mildly increased bandlike densities radiate cephalad from the hilum in the upper lobe region. No significant effusion or lung nodule. IMPRESSION: 1. Expected right chest pneumonectomy findings. 2. Nonspecific increased bandlike densities seen in the left upper lobe-no priors for comparison. Differential for this is scarring, atypical pneumonia.        Assessment and Plan:     Hospital Problems as of 4/27/2018  Never Reviewed          Codes Class Noted - Resolved POA    * (Principal)Atrial fibrillation with rapid ventricular response ([facility] [address] ICD-10-CM: I48.91  ICD-9-CM: 427.31  4/27/2018 - Present Yes        Systolic CHF, acute on chronic (HCC) (Chronic) ICD-10-CM: I50.23  ICD-9-CM: 428.23, 428.0  4/27/2018 - Present Yes        Solitary right kidney (Chronic) ICD-10-CM: Q60.0  ICD-9-CM: 753.0  4/27/2018 - Present Yes        S/P lobectomy of lung (Chronic) ICD-10-CM: Z90.2  ICD-9-CM: V45.89  4/27/2018 - Present Yes        CKD (chronic kidney disease) stage 3, GFR 30-59 ml/min (Chronic) ICD-10-CM: N18.3  ICD-9-CM: 585.3  4/27/2018 - Present Yes        Chronic anemia (Chronic) ICD-10-CM: D64.9  ICD-9-CM: 285.9  4/27/2018 - Present Yes        Hypomagnesemia ICD-10-CM: W82.24  ICD-9-CM: 275.2  4/27/2018 - Present Yes        Hypokalemia ICD-10-CM: E87.6  ICD-9-CM: 276.8  4/27/2018 - Present Yes              PLAN:  · Admit to tele   · Continue IV cardizem drip for afib rate control, can restart oral cardizem once stable   · She is not a longterm anticoagulation candidate due to anemia and reported fall risk   · Her HGB seems actually stable when comparing to her records, will transfuse 2 units with lasix between units today as likely causing symptomatic dyspnea  · Will check anemia labs prior to transfusion, occult stool  ·  will defer GI consult as this seems to be a chronic issue followed at [facility] and not a prior endoscopy candidate  · Will recommend hematology followup in [facility] as she needs possibly chronic transfusion maintenance vs outpatient IV iron   · Will continue IV lasix starting tomorrow with strict I and O, daily weights  · Check ECHO   · Replace potassium/magnesium and followup labs  · Needs close renal function followup in light of having solitary kidney with prior nephrectomy   · Will not pursue additional chest imaging as CT chest from 4,2017 in [facility] shows known CLAIR lesions with serial followup noted  · No current indication of infectious process that wound require antibiotics   · Trend troponin, check TSH  · PPD placed, PT/OT and case management for dispo     Discharge planning:    DVT ppx: SCD  Code status:  Full  Estimated LOS:  Greater than 2 midnights  Risk:  high  Care plan: Rafael Cisneros, [phone]  Signed:  Fernanda Yip MD

## 2018-04-27 NOTE — PROGRESS NOTES
Bedside and Verbal shift change report given to Papito Almanza (oncoming nurse) by Virginia Kumar RN (offgoing nurse). Report included the following information SBAR, Kardex, ED Summary, Intake/Output, MAR, Recent Results, Med Rec Status and Cardiac Rhythm Afib 85.  Daughter at bedside

## 2018-04-28 NOTE — PROGRESS NOTES
Problem: Mobility Impaired (Adult and Pediatric)  Goal: *Acute Goals and Plan of Care (Insert Text)  DISCHARGE GOALS:  (1.)Ms. Abdullahi Garay will move from supine to sit and sit to supine  in bed with INDEPENDENT within 2 treatment day(s). (2.)Ms. Abdullahi Garay will transfer from bed to chair and chair to bed with MODIFIED INDEPENDENCE using the least restrictive device within 2 treatment day(s). (3.)Ms. Abdullahi Garay will ambulate with MODIFIED INDEPENDENCE for 200 feet with the least restrictive device within 2 treatment day(s). ________________________________________________________________________________________________      PHYSICAL THERAPY: Initial Assessment 4/28/2018  INPATIENT: Hospital Day: 2  [place] / Product Type: Medicare /      NAME/AGE/GENDER: Cassi Fernando is a 80 y.o. female   PRIMARY DIAGNOSIS: Atrial fibrillation with rapid ventricular response (HCC) Atrial fibrillation with rapid ventricular response (HCC) Atrial fibrillation with rapid ventricular response (HCC)        ICD-10: Treatment Diagnosis:    · Generalized Muscle Weakness (M62.81)   Precaution/Allergies:  Review of patient's allergies indicates no known allergies. ASSESSMENT:     Ms. Abdullahi Garay presents with above diagnosis. Patient living in [place] but visiting daughter and son-in-law in [place] at time of admission. Patient living alone in an apartment in [facility] [facility] and ambulating with a straight cane. She reports she has had discussions with her PCP re: a rollator but she still does not have one. Patient feels she will be more mobile and interactive in the community with a rollator. Patient would certainly benefit from a rollator and would benefit from continued therapy to improve balance, strength, and mobility. She is expected to progress well. This section established at most recent assessment   PROBLEM LIST (Impairments causing functional limitations):  1.  Decreased Strength  2. Decreased Transfer Abilities  3. Decreased Ambulation Ability/Technique  4. Decreased Balance   INTERVENTIONS PLANNED: (Benefits and precautions of physical therapy have been discussed with the patient.)  1. Bed Mobility  2. Family Education  3. Gait Training  4. Home Exercise Program (HEP)  5. Therapeutic Activites  6. Therapeutic Exercise/Strengthening     TREATMENT PLAN: Frequency/Duration: daily until goals met  Rehabilitation Potential For Stated Goals: Excellent     RECOMMENDED REHABILITATION/EQUIPMENT: (at time of discharge pending progress): Due to the probability of continued deficits (see above) this patient may need continued skilled physical therapy after discharge. Possibly HHPT. Equipment:    Walkers, Type: rollator              HISTORY:   History of Present Injury/Illness (Reason for Referral):  Ms. Letha Sharma is a 81 yo female with PMH of right lobectomy and left nephrectomy performed at [facility] in [place], afib no AC due to anemia and fall risk, sCHF followed by cardio in [place] evaluated for persistent dizziness and near syncope. She is visiting her daughter locally and has felt unwell as of today with increased warmth, palpitations. She was found to have afib with RVR in the ED and is on IV cardizem drip. Additionally she has received lasix 60 mg IV once. CXR shows right lobectomy and band like density of CLAIR. In discussion with her daughter Nic at bedside, she is followed regularly with [facility], in [place] and s/p CT chest / ABD/ pelvis 4,2017 that I personally was able to review in her my chart showing known CLAIR scarring/ nodules. Additionally she had a CTAP that showed chronic issues being followed as well without evidence of etiology for her known anemia. Her HGB Jan 2018 was 6.9 and she recalls receiving blood transfusion then. She is not currently followed by GI (states not a good scope candidate) nor hematology. Currently HGB 7.3 and denies bethany GI blood loss.    Past Medical History/Comorbidities:   Ms. Tera Anton  has a past medical history of Arthritis; Asthma; CAD (coronary artery disease); Cancer ([facility] [address] Chronic kidney disease; Heart failure ([facility] [address] Hypertension; Psychiatric disorder; and PUD (peptic ulcer disease). Ms. Tera Anton  has a past surgical history that includes hx urological; hx other surgical; and hx heent. Social History/Living Environment:   Home Environment: Apartment  # Steps to Enter: 0 (elevator)  One/Two Story Residence: One story  Lift Chair Available: No  Living Alone: Yes  Support Systems: Child(arya), Family member(s)  Patient Expects to be Discharged to[de-identified] Apartment  Current DME Used/Available at Home: Cane, straight  Tub or Shower Type: Shower  Prior Level of Function/Work/Activity:  Ambulatory with straight cane. I with ADLs    Personal Factors:          Sex:  female        Age:  80 y.o. Number of Personal Factors/Comorbidities that affect the Plan of Care: 1-2: MODERATE COMPLEXITY   EXAMINATION:   Most Recent Physical Functioning:   Gross Assessment:  AROM: Generally decreased, functional  Strength: Generally decreased, functional  Coordination: Within functional limits  Tone: Normal               Posture:     Balance:  Sitting: Intact  Standing: Pull to stand; With support Bed Mobility:  Supine to Sit: Supervision (head of bed elevated)  Sit to Supine:  (in chair)  Scooting: Supervision  Wheelchair Mobility:     Transfers:  Sit to Stand: Stand-by assistance (from bed; min assist from toilet)  Stand to Sit: Stand-by assistance  Bed to Chair: Stand-by assistance  Gait:     Base of Support: Center of gravity altered  Speed/Gemini: Slow  Step Length: Left shortened;Right shortened  Gait Abnormalities: Decreased step clearance  Distance (ft): 100 Feet (ft)  Assistive Device: Walker, rolling  Ambulation - Level of Assistance: Stand-by assistance  Interventions: Safety awareness training;Verbal cues      Body Structures Involved:  1.  Muscles Body Functions Affected:  1. Movement Related Activities and Participation Affected:  1. Mobility  2. Community, Social and [place]   Number of elements that affect the Plan of Care: 4+: HIGH COMPLEXITY   CLINICAL PRESENTATION:   Presentation: Stable and uncomplicated: LOW COMPLEXITY   CLINICAL DECISION MAKIN [facility] Short Form  How much difficulty does the patient currently have. .. Unable A Lot A Little None   1. Turning over in bed (including adjusting bedclothes, sheets and blankets)? [] 1   [] 2   [] 3   [x] 4   2. Sitting down on and standing up from a chair with arms ( e.g., wheelchair, bedside commode, etc.)   [] 1   [] 2   [x] 3   [] 4   3. Moving from lying on back to sitting on the side of the bed? [] 1   [] 2   [] 3   [x] 4   How much help from another person does the patient currently need. .. Total A Lot A Little None   4. Moving to and from a bed to a chair (including a wheelchair)? [] 1   [] 2   [x] 3   [] 4   5. Need to walk in hospital room? [] 1   [] 2   [x] 3   [] 4   6. Climbing 3-5 steps with a railing? [] 1   [] 2   [x] 3   [] 4   © , Trustees of [address] [place] [address], under license to [employer]. All rights reserved      Score:  Initial: 20 Most Recent: X (Date: -- )    Interpretation of Tool:  Represents activities that are increasingly more difficult (i.e. Bed mobility, Transfers, Gait). Score 24 23 22-20 19-15 14-10 9-7 6     Modifier CH CI CJ CK CL CM CN      ?  Mobility - Walking and Moving Around:     - CURRENT STATUS: CJ - 20%-39% impaired, limited or restricted    - GOAL STATUS: CI - 1%-19% impaired, limited or restricted    - D/C STATUS:  ---------------To be determined---------------  [place] / Product Type: Medicare /      Medical Necessity:     · Patient is expected to demonstrate progress in strength, balance and coordination to increase independence with mobility and ADLs and improve safety at home. · Patient demonstrates good rehab potential due to higher previous functional level. Reason for Services/Other Comments:  · Patient continues to require skilled intervention due to generalized weakness affecting mobility. Use of outcome tool(s) and clinical judgement create a POC that gives a: Clear prediction of patient's progress: LOW COMPLEXITY            TREATMENT:   (In addition to Assessment/Re-Assessment sessions the following treatments were rendered)   Pre-treatment Symptoms/Complaints:  Patient ready to get up out of bed. Pain: Initial:   Pain Intensity 1: 0  Post Session:  0     Assessment/Reassessment only, no treatment provided today    Braces/Orthotics/Lines/Etc:   · ICU monitors  · O2 Device: Room air  Treatment/Session Assessment:    · Response to Treatment:  Patient participated well. Good bed mobility. Some difficulty with sit to stand from lower height. Ambulates well with walker. · Interdisciplinary Collaboration:   o Physical Therapist  o Occupational Therapist  o Registered Nurse  o   · After treatment position/precautions:   o Up in chair  o Bed/Chair-wheels locked  o Call light within reach  o Family at bedside   · Compliance with Program/Exercises: compliant all of the time. · Recommendations/Intent for next treatment session: \"Next visit will focus on advancements to more challenging activities and reduction in assistance provided\".   Total Treatment Duration:  PT Patient Time In/Time Out  Time In: 0930  Time Out: 0945    Leonel Zamudio PT

## 2018-04-28 NOTE — PROGRESS NOTES
Pt given lasix 40 mg iv as ordered between units of blood. Pt up to commode to void, missed hat and voided in toilet. Second unit of PRBC's started.

## 2018-04-28 NOTE — DISCHARGE SUMMARY
Hospitalist Discharge Summary     Admit Date:  2018 10:21 AM   Name:  Ernie Lechuga   Age:  80 y.o.  :  1932   MRN:  [id number]   PCP:  Not On File [facility]  Treatment Team: Attending Provider: Selma Babinski, MD; Care Manager: Estuardo Herrera    Problem List for this Hospitalization:  Hospital Problems as of 2018  Never Reviewed          Codes Class Noted - Resolved POA    * (Principal)Atrial fibrillation with rapid ventricular response ([address] ICD-10-CM: I48.91  ICD-9-CM: 427.31  2018 - Present Yes        Systolic CHF, acute on chronic (HCC) (Chronic) ICD-10-CM: I50.23  ICD-9-CM: 428.23, 428.0  2018 - Present Yes        Solitary right kidney (Chronic) ICD-10-CM: Q60.0  ICD-9-CM: 753.0  2018 - Present Yes        S/P lobectomy of lung (Chronic) ICD-10-CM: Z90.2  ICD-9-CM: V45.89  2018 - Present Yes        CKD (chronic kidney disease) stage 3, GFR 30-59 ml/min (Chronic) ICD-10-CM: N18.3  ICD-9-CM: 585.3  2018 - Present Yes        Chronic anemia (Chronic) ICD-10-CM: D64.9  ICD-9-CM: 285.9  2018 - Present Yes        Hypomagnesemia ICD-10-CM: E02.35  ICD-9-CM: 275.2  2018 - Present Yes        Hypokalemia ICD-10-CM: E87.6  ICD-9-CM: 276.8  2018 - Present Yes                Admission HPI from 2018:    \"Ms. Kirk Prieto is a 79 yo female with PMH of right lobectomy and left nephrectomy performed at [facility] in [place], [address] no AC due to anemia and fall risk, sCHF followed by cardio in [place] evaluated for persistent dizziness and near syncope. She is visiting her daughter locally and has felt unwell as of today with increased warmth, palpitations. She was found to have afib with RVR in the ED and is on IV cardizem drip. Additionally she has received lasix 60 mg IV once. CXR shows right lobectomy and band like density of CLAIR.  In discussion with her daughter Perla Braun at bedside, she is followed regularly with [facility], in [place] and s/p CT chest / ABD/ pelvis 5,3460 that I personally was able to review in her my chart showing known CLAIR scarring/ nodules. Additionally she had a CTAP that showed chronic issues being followed as well without evidence of etiology for her known anemia. Her HGB Jan 2018 was 6.9 and she recalls receiving blood transfusion then. She is not currently followed by GI (states not a good scope candidate) nor hematology. Currently HGB 7.3 and denies bethany GI blood loss. She denies bethany fever, no chills, no dyspnea or cough, no chest pain, no anorexia, no emesis or nausea. \"    Hospital Course:  Patient was given 2u PRBC and hb improved, remained stable. Iron studies indicated deficiency. She may have chronic imperceptible blood loss. She has already been deemed a poor candidate for endoscopy. She has not had any melena or BRBPR here. She lives in [address] [place] [address] but is here visiting her son and is staying with him. Her vitals improved; probably were related to her anemia. She understands she needs to follow up with PCP when she gets back to [facility]; she plans on staying with her son here until Monday and then she will be going back home. She is back on her home meds and vitals are already stable, surprisingly. She improved faster than expected (HR was in 180s with hypoxia 75% on presentation), and is feeling much better. She is stable enough for discharge today. Follow up instructions and discharge meds at bottom of this note. Plan was discussed with pt, son. All questions answered. Patient was stable at time of discharge. Diagnostic Imaging/Tests:   Xr Chest Sngl V    Result Date: 4/27/2018  1 View portable chest x-ray 4/27/2018 10:59 AM Indication: Shortness of breath and tachycardia. Patient has had prior right pneumonectomy. Comparison: None available at this hospital PACS system Findings:  This portable erect AP chest at 1100 hours shows the patient with right pneumonectomy changes, expected volume loss with shift of the cardiac and mediastinal contours to the right is seen. This hemithorax is completely opaque-expected finding for this. Monitoring leads overlie the chest. Grossly-the heart is not markedly enlarged but it cannot be characterized well on this exam otherwise. The left lung is well expanded. Vascularity is appropriate. Some mildly increased bandlike densities radiate cephalad from the hilum in the upper lobe region. No significant effusion or lung nodule. IMPRESSION: 1. Expected right chest pneumonectomy findings. 2. Nonspecific increased bandlike densities seen in the left upper lobe-no priors for comparison. Differential for this is scarring, atypical pneumonia. Echocardiogram results:  Results for orders placed or performed during the hospital encounter of 18   2D ECHO COMPLETE ADULT (TTE) W OR WO CONTR    Narrative    [facility]  [facility] [address], [address]  [phone]    Transthoracic Echocardiogram  2D, M-mode, Doppler, and Color Doppler    Patient: Piter Lee  MR #: [id number]  : [id number]  Age: 80 years  Gender: Female  Study date: 2018  Account #: [de-identified]  Height: 65 in  Weight: 119.7 lb  BSA: 1.59 mï¾²  Status:Routine  Location: 371  BP: 154/ 83    Allergies: NO KNOWN ALLERGIES    Sonographer:  ANTHONY Warner  Group:  [facility]  Referring Physician:  Xena Philip. Martin Bradshaw MD  Reading Physician:  Page Fox MD [facility]    INDICATIONS: CHF \    Pt has hx of lung surgery causing off axis views. Unable to use definity    PROCEDURE: This was a routine study. A transthoracic echocardiogram was  performed. The study included complete 2D imaging, M-mode, complete spectral  Doppler, and color Doppler. Echocardiographic views were limited by chest   wall  deformity. This was a technically difficult study.     LEFT VENTRICLE: Grade 2 diastolic dysfunction with elevated LAP and average  E/e' of 7.6 Size was normal. Systolic function was moderately reduced. Ejection  fraction was estimated to be 40 %. Ef difficult to assess with freqent ectopy. This study was inadequate for the evaluation of regional wall motion. Wall  thickness was normal. Doppler parameters were consistent with mild diastolic  dysfunction (grade 1). RIGHT VENTRICLE: The size was normal. Systolic function was normal. There was  moderate pulmonary artery hypertension. Estimated peak pressure was in the  range of 50-55 mmHg. LEFT ATRIUM: The atrium was moderately dilated. RIGHT ATRIUM: Size was normal.    SYSTEMIC VEINS: IVC: The inferior vena cava was normal in size and course. AORTIC VALVE: The valve was structurally normal, tri-commissural. There was   no  evidence for stenosis. There was mild regurgitation. MITRAL VALVE: Valve structure was normal. There was no evidence for stenosis. There was moderate regurgitation. TRICUSPID VALVE: The valve structure was normal. There was no evidence for  stenosis. There was mild to moderate regurgitation. PULMONIC VALVE: The valve structure was normal. There was no evidence for  stenosis. There was no insufficiency. PERICARDIUM: A small to moderate pericardial effusion was identified. There   was  no evidence of hemodynamic compromise. AORTA: The root exhibited normal size. SUMMARY:    -  Left ventricle: Grade 2 diastolic dysfunction with elevated LAP and   average  E/e' of 7.6 Systolic function was moderately reduced. Ejection fraction was  estimated to be 40 %. Ef difficult to assess with freqent ectopy. This study   was  inadequate for the evaluation of regional wall motion. Doppler parameters   were  consistent with mild diastolic dysfunction (grade 1). -  Right ventricle: There was moderate pulmonary artery hypertension.    -  Left atrium: The atrium was moderately dilated. -  Mitral valve: There was moderate regurgitation.    -  Tricuspid valve:  There was mild to moderate regurgitation.    -  Pericardium: A small to moderate pericardial effusion was identified. There  was no evidence of hemodynamic compromise. SYSTEM MEASUREMENT TABLES    2D mode  AoR Diam (2D): 3 cm  LA Dimension (2D): 4.5 cm  Left Atrium Systolic Volume Index; Method of Disks, Biplane; 2D mode;: 41.5  ml/m2  IVS/LVPW (2D): 1  IVSd (2D): 1 cm  LVIDd (2D): 4.4 cm  LVIDs (2D): 3.4 cm  LVOT Area (2D): 3.1 cm2  LVPWd (2D): 1 cm    Unspecified Scan Mode  Peak Grad; Mean; Antegrade Flow: 5 mm[Hg]  Vmax;  Antegrade Flow: 117 cm/s  LVOT Diam: 2 cm  RVSP: 45 mm[Hg]    Prepared and signed by    Dee Wong MD [facility]  Signed 27-Apr-2018 16:25:47           All Micro Results     None          Labs: Results:       BMP, Mg, Phos Recent Labs      04/28/18   1044  04/28/18   0432  04/27/18   1137   NA   --   141  141   K   --   3.7  3.4*   CL   --   105  105   CO2   --   27  24   AGAP   --   9  12   BUN   --   20  18   CREA   --   1.49*  1.37*   CA   --   7.6*  7.9*   GLU   --   80  96   MG  2.0  1.2*  1.4*      CBC Recent Labs      04/28/18   0432  04/27/18   1137   WBC  10.3  10.6   RBC  3.53*  3.09*   HGB  9.2*  7.3*   HCT  29.2*  25.0*   PLT  312  334   GRANS  66  74   LYMPH  20  18   EOS  4  2   MONOS  9  5   BASOS  1  0   IG  0  1   ANEU  6.9  7.9   ABL  2.0  2.0   LONDON  0.4  0.2   ABM  0.9  0.5   ABB  0.1  0.0   AIG  0.0  0.1      LFT Recent Labs      04/27/18   1137   SGOT  29   ALT  15   AP  104   TP  6.4   ALB  2.2*   GLOB  4.2*   AGRAT  0.5*      Cardiac Testing Lab Results   Component Value Date/Time     04/27/2018 11:37 AM    Troponin-I, Qt. <0.04 04/28/2018 04:32 AM    Troponin-I, Qt. <0.04 04/27/2018 11:05 PM    Troponin-I, Qt. <0.04 04/27/2018 02:57 PM      Coagulation Tests No results found for: PTP, INR, APTT   A1c No results found for: HBA1C, HGBE8, FMB5NOHK   Lipid Panel No results found for: CHOL, CHOLPOCT, CHOLX, CHLST, CHOLV, 322207, HDL, LDL, LDLC, DLDLP, 088049, VLDLC, VLDL, TGLX, TRIGL, [facility], [facility]   Thyroid Panel Lab Results   Component Value Date/Time    TSH 2.758 04/27/2018 11:37 AM        Most Recent UA Lab Results   Component Value Date/Time    Color YELLOW 04/27/2018 06:50 PM    Appearance CLEAR 04/27/2018 06:50 PM    Specific gravity 1.005 04/27/2018 06:50 PM    pH (UA) 5.5 04/27/2018 06:50 PM    Protein NEGATIVE  04/27/2018 06:50 PM    Glucose NEGATIVE  04/27/2018 06:50 PM    Ketone NEGATIVE  04/27/2018 06:50 PM    Bilirubin NEGATIVE  04/27/2018 06:50 PM    Blood NEGATIVE  04/27/2018 06:50 PM    Urobilinogen 0.2 04/27/2018 06:50 PM    Nitrites NEGATIVE  04/27/2018 06:50 PM    Leukocyte Esterase NEGATIVE  04/27/2018 06:50 PM        No Known Allergies  Immunization History   Administered Date(s) Administered    TB Skin Test (PPD) Intradermal 04/27/2018       All Labs from Last 24 Hrs:  Recent Results (from the past 24 hour(s))   RETICULOCYTE COUNT    Collection Time: 04/27/18  2:57 PM   Result Value Ref Range    Reticulocyte count 1.7 0.3 - 2.0 %    Absolute Retic Cnt. 0.0528 0.0164 - 0.0776 M/ul    Immature Retic Fraction 21.0 (H) 3.0 - 15.9 %    Retic Hgb Conc. 23 (L) 29 - 35 pg   TYPE + CROSSMATCH    Collection Time: 04/27/18  2:57 PM   Result Value Ref Range    Crossmatch Expiration 04/30/2018     ABO/Rh(D) O POSITIVE     Antibody screen NEG     Unit number [id number]     Blood component type  LR     Unit division 00     Status of unit TRANSFUSED     Crossmatch result Compatible     Unit number [id number]     Blood component type  LR     Unit division 00     Status of unit TRANSFUSED     Crossmatch result Compatible    TROPONIN I    Collection Time: 04/27/18  2:57 PM   Result Value Ref Range    Troponin-I, Qt. <0.04 0.02 - 0.05 NG/ML   URINALYSIS W/ RFLX MICROSCOPIC    Collection Time: 04/27/18  6:50 PM   Result Value Ref Range    Color YELLOW      Appearance CLEAR      Specific gravity 1.005 1.001 - 1.023      pH (UA) 5.5 5.0 - 9.0      Protein NEGATIVE  NEG mg/dL    Glucose NEGATIVE  mg/dL    Ketone NEGATIVE  NEG mg/dL    Bilirubin NEGATIVE  NEG      Blood NEGATIVE  NEG      Urobilinogen 0.2 0.2 - 1.0 EU/dL    Nitrites NEGATIVE  NEG      Leukocyte Esterase NEGATIVE  NEG     TROPONIN I    Collection Time: 04/27/18 11:05 PM   Result Value Ref Range    Troponin-I, Qt. <0.04 0.02 - 2.34 NG/ML   METABOLIC PANEL, BASIC    Collection Time: 04/28/18  4:32 AM   Result Value Ref Range    Sodium 141 136 - 145 mmol/L    Potassium 3.7 3.5 - 5.1 mmol/L    Chloride 105 98 - 107 mmol/L    CO2 27 21 - 32 mmol/L    Anion gap 9 7 - 16 mmol/L    Glucose 80 65 - 100 mg/dL    BUN 20 8 - 23 MG/DL    Creatinine 1.49 (H) 0.6 - 1.0 MG/DL    GFR est AA 43 (L) >60 ml/min/1.73m2    GFR est non-AA 35 (L) >60 ml/min/1.73m2    Calcium 7.6 (L) 8.3 - 10.4 MG/DL   CBC WITH AUTOMATED DIFF    Collection Time: 04/28/18  4:32 AM   Result Value Ref Range    WBC 10.3 4.3 - 11.1 K/uL    RBC 3.53 (L) 4.05 - 5.25 M/uL    HGB 9.2 (L) 11.7 - 15.4 g/dL    HCT 29.2 (L) 35.8 - 46.3 %    MCV 82.7 79.6 - 97.8 FL    MCH 26.1 26.1 - 32.9 PG    MCHC 31.5 31.4 - 35.0 g/dL    RDW 18.2 (H) 11.9 - 14.6 %    PLATELET 689 535 - 219 K/uL    MPV 10.1 (L) 10.8 - 14.1 FL    DF AUTOMATED      NEUTROPHILS 66 43 - 78 %    LYMPHOCYTES 20 13 - 44 %    MONOCYTES 9 4.0 - 12.0 %    EOSINOPHILS 4 0.5 - 7.8 %    BASOPHILS 1 0.0 - 2.0 %    IMMATURE GRANULOCYTES 0 0.0 - 5.0 %    ABS. NEUTROPHILS 6.9 1.7 - 8.2 K/UL    ABS. LYMPHOCYTES 2.0 0.5 - 4.6 K/UL    ABS. MONOCYTES 0.9 0.1 - 1.3 K/UL    ABS. EOSINOPHILS 0.4 0.0 - 0.8 K/UL    ABS. BASOPHILS 0.1 0.0 - 0.2 K/UL    ABS. IMM.  GRANS. 0.0 0.0 - 0.5 K/UL   MAGNESIUM    Collection Time: 04/28/18  4:32 AM   Result Value Ref Range    Magnesium 1.2 (LL) 1.8 - 2.4 mg/dL   TROPONIN I    Collection Time: 04/28/18  4:32 AM   Result Value Ref Range    Troponin-I, Qt. <0.04 0.02 - 0.05 NG/ML   MAGNESIUM    Collection Time: 04/28/18 10:44 AM   Result Value Ref Range    Magnesium 2.0 1.8 - 2.4 mg/dL Discharge Exam:  Patient Vitals for the past 24 hrs:   Temp Pulse Resp BP SpO2   04/28/18 1142 97.8 °F (36.6 °C) 80 24 134/65 100 %   04/28/18 1102 - 68 20 152/73 100 %   04/28/18 0943 - 80 20 136/72 100 %   04/28/18 0825 97.8 °F (36.6 °C) 73 17 137/63 100 %   04/28/18 0815 - 77 - - -   04/28/18 0706 - 73 13 144/73 100 %   04/28/18 0548 - 75 9 149/50 100 %   04/28/18 0429 97.8 °F (36.6 °C) 71 19 157/74 100 %   04/28/18 0311 - (!) 58 12 136/65 100 %   04/28/18 0240 - (!) 51 11 146/74 100 %   04/28/18 0200 - 75 14 - 100 %   04/28/18 0100 - 68 (!) 0 - 98 %   04/28/18 0028 - 67 16 145/71 100 %   04/28/18 0000 - 74 25 - 100 %   04/27/18 2300 97.5 °F (36.4 °C) 69 24 146/71 100 %   04/27/18 2208 - 66 15 145/62 100 %   04/27/18 2100 - 74 27 - 100 %   04/27/18 2038 - 69 25 137/63 100 %   04/27/18 2037 97.4 °F (36.3 °C) 72 26 137/63 100 %   04/27/18 2027 - 66 17 119/58 100 %   04/27/18 2022 97.4 °F (36.3 °C) 73 21 119/58 100 %   04/1934 98.2 °F (36.8 °C) 72 15 114/53 100 %   04/27/18 1931 - 70 13 114/53 100 %   04/27/18 1852 98.2 °F (36.8 °C) - - - -   04/27/18 1830 - 82 29 121/65 98 %   04/27/18 1800 - 84 18 145/71 98 %   04/27/18 1732 97.8 °F (36.6 °C) 94 19 120/71 99 %   04/27/18 1730 - 79 16 120/71 100 %   04/27/18 1709 - 83 16 97/55 100 %   04/27/18 1700 97.9 °F (36.6 °C) 74 17 105/44 100 %   04/27/18 1639 97.4 °F (36.3 °C) 83 18 119/55 100 %   04/27/18 1632 - 79 18 119/55 100 %   04/27/18 1600 - 93 14 131/56 93 %   04/27/18 1526 - 89 19 127/68 95 %   04/27/18 1403 97.6 °F (36.4 °C) 99 30 134/75 95 %   04/27/18 1301 - 91 14 154/83 96 %   04/27/18 1230 - (!) 105 20 142/77 99 %     Oxygen Therapy  O2 Sat (%): 100 % (04/28/18 1142)  Pulse via Oximetry: 85 beats per minute (04/28/18 1142)  O2 Device: Room air (04/28/18 0825)    Intake/Output Summary (Last 24 hours) at 04/28/18 1155  Last data filed at 04/28/18 1045   Gross per 24 hour   Intake          1826.95 ml   Output             2875 ml   Net -1048.05 ml       General:    Well nourished. Alert. No distress. Eyes:   Normal sclera. Extraocular movements intact. ENT:  Normocephalic, atraumatic. Moist mucous membranes  CV:   Regular rate and rhythm. No murmur, rub, or gallop. Lungs:  Clear to auscultation bilaterally. No wheezing, rhonchi, or rales. Abdomen: Soft, nontender, nondistended. Bowel sounds normal.   Extremities: Warm and dry. No cyanosis or edema. Neurologic: CN II-XII grossly intact. Sensation intact. Skin:     No rashes or jaundice. Psych:  Normal mood and affect. Discharge Info:   Current Discharge Medication List      START taking these medications    Details   ferrous sulfate 325 mg (65 mg iron) tablet Take 1 Tab by mouth daily (with breakfast). Qty: 30 Tab, Refills: 2         CONTINUE these medications which have NOT CHANGED    Details   omeprazole (PRILOSEC) 40 mg capsule Take 40 mg by mouth daily. furosemide (LASIX) 40 mg tablet Take  by mouth daily. simvastatin (ZOCOR) 20 mg tablet Take 20 mg by mouth nightly. dilTIAZem (CARDIZEM) 120 mg tablet Take 120 mg by mouth daily. lisinopril (PRINIVIL, ZESTRIL) 5 mg tablet Take 5 mg by mouth daily. loperamide (IMMODIUM) 2 mg tablet Take 2 mg by mouth four (4) times daily as needed for Diarrhea. Indications: Diarrhea      calcium carbonate (TUMS) 200 mg calcium (500 mg) chew Take 1 Tab by mouth every six (6) hours as needed. Indications: Heartburn      cholecalciferol (VITAMIN D3) 1,000 unit cap Take 1,000 Units by mouth daily. Disposition: home    Activity: Activity as tolerated and PT/OT per Home Health  Diet: DIET CARDIAC Regular    No orders of the defined types were placed in this encounter. Follow-up Information     Follow up With Details Comments Contact Info    Not On File [facility] In 1 week follow up Not On File (62) Patient has a PCP but that physician is not listed in [facility].             Time spent in patient discharge planning and coordination 35 minutes.     Signed:  Lisbet Carrasquillo MD

## 2018-04-28 NOTE — PROGRESS NOTES
Pt assisted to commode to void and have bowel movement. Blood continues to infuse without any difficulty.

## 2018-04-28 NOTE — PROGRESS NOTES
Problem: Self Care Deficits Care Plan (Adult)  Goal: *Acute Goals and Plan of Care (Insert Text)  1. Patient will perform grooming at modified indep level while standing. 2. Patient will perform upper body dressing with at modified indep level . 3. Patient will perform lower body dressing with at modified indep level . 4. Patient will perform bathing with at modified indep level . 5. Patient will perform toileting and toilet transfer with at modified indep level . 6. Patient will perform ADL functional mobility and tranfers in room with at modified indep level . 7. Patient/family to demonstrate knowledge of home safety and DME recommendations. Goals to be achieved in 7 days. OCCUPATIONAL THERAPY: Initial Assessment and AM 4/28/2018  INPATIENT: Hospital Day: 2  [place] / [place] / Product Type: Medicare /      NAME/AGE/GENDER: Faraz Nicholson is a 80 y.o. female   PRIMARY DIAGNOSIS:  Atrial fibrillation with rapid ventricular response (HCC) Atrial fibrillation with rapid ventricular response (HCC) Atrial fibrillation with rapid ventricular response (HCC)        ICD-10: Treatment Diagnosis:    · Generalized Muscle Weakness (M62.81)  · Other lack of cordination (R27.8)   Precautions/Allergies:     Review of patient's allergies indicates no known allergies. ASSESSMENT:     Ms. Shaun Anthony presents with above diagnosis and was seen in ICU with son-in-law present. Pt is from [facility] and is visiting family when she became ill. At baseline, pt is indep/modified indep with self care and functional mobility. She lives alone in a high-rise apartment and has family to check in daily per report. Pt will benefit from skilled OT services to maximize indep with self care and functional mobility. This section established at most recent assessment   PROBLEM LIST (Impairments causing functional limitations):  1. Decreased Strength  2. Decreased ADL/Functional Activities  3.  Decreased Transfer Abilities  4. Decreased Ambulation Ability/Technique  5. Decreased Balance  6. Decreased Activity Tolerance   INTERVENTIONS PLANNED: (Benefits and precautions of occupational therapy have been discussed with the patient.)  1. Activities of daily living training  2. Adaptive equipment training  3. Balance training  4. Clothing management  5. Donning&doffing training  6. Therapeutic activity  7. Therapeutic exercise     TREATMENT PLAN: Frequency/Duration: Follow patient 4x/week to address above goals. Rehabilitation Potential For Stated Goals: Excellent     RECOMMENDED REHABILITATION/EQUIPMENT: (at time of discharge pending progress): Due to the probability of continued deficits (see above) this patient will not likely need continued skilled occupational therapy after discharge. Equipment:    TBD              OCCUPATIONAL PROFILE AND HISTORY:   History of Present Injury/Illness (Reason for Referral):  See above  Past Medical History/Comorbidities:   Ms. Trent Raygoza  has a past medical history of Arthritis; Asthma; CAD (coronary artery disease); Cancer ([facility] [address] Chronic kidney disease; Heart failure ([facility] [address] Hypertension; Psychiatric disorder; and PUD (peptic ulcer disease). Ms. Trent Raygoza  has a past surgical history that includes hx urological; hx other surgical; and hx heent.   Social History/Living Environment:   Home Environment: Apartment  # Steps to Enter: 0  One/Two Story Residence: One story  Lift Chair Available: No  Living Alone: Yes  Support Systems: Child(arya), Family member(s)  Patient Expects to be Discharged to[de-identified] Apartment  Current DME Used/Available at Home: Cane, straight  Tub or Shower Type: Shower  Prior Level of Function/Work/Activity:  See assessment     Number of Personal Factors/Comorbidities that affect the Plan of Care: Brief history (0):  LOW COMPLEXITY   ASSESSMENT OF OCCUPATIONAL PERFORMANCE[de-identified]   Activities of Daily Living:           Basic ADLs (From Assessment) Complex ADLs (From Assessment)   Feeding: Supervision  Oral Facial Hygiene/Grooming: Supervision  Bathing: Supervision  Upper Body Dressing: Supervision  Lower Body Dressing: Supervision  Toileting: Supervision     Grooming/Bathing/Dressing Activities of Daily Living     Cognitive Retraining  Safety/Judgement: Awareness of environment; Fall prevention                 Functional Transfers  Toilet Transfer : Stand-by assistance     Bed/Mat Mobility  Supine to Sit: Supervision  Sit to Supine:  (in chair)  Sit to Stand: Stand-by assistance  Bed to Chair: Stand-by assistance  Scooting: Supervision       Most Recent Physical Functioning:   Gross Assessment:                  Posture:     Balance:  Sitting: Intact  Standing: Pull to stand; With support Bed Mobility:  Supine to Sit: Supervision  Sit to Supine:  (in chair)  Scooting: Supervision  Wheelchair Mobility:     Transfers:  Sit to Stand: Stand-by assistance  Stand to Sit: Stand-by assistance  Bed to Chair: Stand-by assistance     Strength:          3+/5 for gross UE strength           Patient Vitals for the past 6 hrs:   BP BP Patient Position SpO2 Pulse   04/28/18 0548 149/50 - 100 % 75   04/28/18 0706 144/73 - 100 % 73   04/28/18 0815 - - - 77   04/28/18 0825 137/63 At rest 100 % 73   04/28/18 0943 136/72 - 100 % 80       Mental Status  Neurologic State: Alert  Orientation Level: Oriented X4  Cognition: Appropriate decision making, Appropriate for age attention/concentration, Appropriate safety awareness, Follows commands  Perception: Appears intact  Perseveration: No perseveration noted  Safety/Judgement: Awareness of environment, Fall prevention                          Physical Skills Involved:  1. Range of Motion  2. Balance  3. Strength Cognitive Skills Affected (resulting in the inability to perform in a timely and safe manner):  1.  None Psychosocial Skills Affected:  1. NOne   Number of elements that affect the Plan of Care: 1-3:  LOW COMPLEXITY   CLINICAL DECISION MAKING: [address] [place] [id number] AM-PAC 6 Clicks   Daily Activity Inpatient Short Form  How much help from another person does the patient currently need. .. Total A Lot A Little None   1. Putting on and taking off regular lower body clothing? [] 1   [] 2   [x] 3   [] 4   2. Bathing (including washing, rinsing, drying)? [] 1   [] 2   [x] 3   [] 4   3. Toileting, which includes using toilet, bedpan or urinal?   [] 1   [] 2   [x] 3   [] 4   4. Putting on and taking off regular upper body clothing? [] 1   [] 2   [] 3   [x] 4   5. Taking care of personal grooming such as brushing teeth? [] 1   [] 2   [] 3   [x] 4   6. Eating meals? [] 1   [] 2   [] 3   [x] 4   © 2007, Trustees of [address] [place] [id number], under license to [employer]. All rights reserved      Score:  Initial: 21 Most Recent: X (Date: -- )    Interpretation of Tool:  Represents activities that are increasingly more difficult (i.e. Bed mobility, Transfers, Gait). Score 24 23 22-20 19-15 14-10 9-7 6     Modifier CH CI CJ CK CL CM CN      ? Self Care:     - CURRENT STATUS: CJ - 20%-39% impaired, limited or restricted    - GOAL STATUS: CI - 1%-19% impaired, limited or restricted    - D/C STATUS:  ---------------To be determined---------------  [place] / Product Type: Medicare /      Medical Necessity:     · Patient is expected to demonstrate progress in strength, balance, coordination and functional technique to decrease assistance required with self care and functional mobility. Reason for Services/Other Comments:  · Patient continues to require skilled intervention due to decreased self care and functional mobility.    Use of outcome tool(s) and clinical judgement create a POC that gives a: LOW COMPLEXITY         TREATMENT:   (In addition to Assessment/Re-Assessment sessions the following treatments were rendered)     Pre-treatment Symptoms/Complaints:  None  Pain: Initial:   Pain Intensity 1: 0 Post Session:  0     Assessment/Reassessment only, no treatment provided today    Braces/Orthotics/Lines/Etc:   · O2 Device: Room air  Treatment/Session Assessment:    · Response to Treatment:  Tolerated well  · Interdisciplinary Collaboration:   o Physical Therapist  o Occupational Therapist  o Registered Nurse  · After treatment position/precautions:   o Up in chair  o Bed/Chair-wheels locked  o Caregiver at bedside  o Call light within reach  o RN notified   · Compliance with Program/Exercises: compliant all of the time. · Recommendations/Intent for next treatment session: \"Next visit will focus on advancements to more challenging activities\".   Total Treatment Duration:  OT Patient Time In/Time Out  Time In: 0920  Time Out: [facility],

## 2018-04-28 NOTE — DISCHARGE INSTRUCTIONS
Follow up with Primary Care Physician in 1-2 weeks. DISCHARGE SUMMARY from Nurse    PATIENT INSTRUCTIONS:    After general anesthesia or intravenous sedation, for 24 hours or while taking prescription Narcotics:  · Limit your activities  · Do not drive and operate hazardous machinery  · Do not make important personal or business decisions  · Do  not drink alcoholic beverages  · If you have not urinated within 8 hours after discharge, please contact your surgeon on call. Report the following to your surgeon:  · Excessive pain, swelling, redness or odor of or around the surgical area  · Temperature over 100.5  · Nausea and vomiting lasting longer than 4 hours or if unable to take medications  · Any signs of decreased circulation or nerve impairment to extremity: change in color, persistent  numbness, tingling, coldness or increase pain  · Any questions    What to do at Home:  Recommended activity: Activity as tolerated,     *  Please give a list of your current medications to your Primary Care Provider. *  Please update this list whenever your medications are discontinued, doses are      changed, or new medications (including over-the-counter products) are added. *  Please carry medication information at all times in case of emergency situations. These are general instructions for a healthy lifestyle:    No smoking/ No tobacco products/ Avoid exposure to second hand smoke  Surgeon General's Warning:  Quitting smoking now greatly reduces serious risk to your health.     Obesity, smoking, and sedentary lifestyle greatly increases your risk for illness    A healthy diet, regular physical exercise & weight monitoring are important for maintaining a healthy lifestyle    You may be retaining fluid if you have a history of heart failure or if you experience any of the following symptoms:  Weight gain of 3 pounds or more overnight or 5 pounds in a week, increased swelling in our hands or feet or shortness of breath while lying flat in bed. Please call your doctor as soon as you notice any of these symptoms; do not wait until your next office visit. Recognize signs and symptoms of STROKE:    F-face looks uneven    A-arms unable to move or move unevenly    S-speech slurred or non-existent    T-time-call 911 as soon as signs and symptoms begin-DO NOT go       Back to bed or wait to see if you get better-TIME IS BRAIN. Warning Signs of HEART ATTACK     Call 911 if you have these symptoms:   Chest discomfort. Most heart attacks involve discomfort in the center of the chest that lasts more than a few minutes, or that goes away and comes back. It can feel like uncomfortable pressure, squeezing, fullness, or pain.  Discomfort in other areas of the upper body. Symptoms can include pain or discomfort in one or both arms, the back, neck, jaw, or stomach.  Shortness of breath with or without chest discomfort.  Other signs may include breaking out in a cold sweat, nausea, or lightheadedness. Don't wait more than five minutes to call 911 - MINUTES MATTER! Fast action can save your life. Calling 911 is almost always the fastest way to get lifesaving treatment. Emergency Medical Services staff can begin treatment when they arrive -- up to an hour sooner than if someone gets to the hospital by car. The discharge information has been reviewed with the patient. The patient verbalized understanding. Discharge medications reviewed with the patient and appropriate educational materials and side effects teaching were provided. ___________________________________________________________________________________________________________________________________     Learning About ACE Inhibitors for Heart Failure  Introduction    ACE (angiotensin-converting enzyme) inhibitors block an enzyme that makes blood vessels narrow. As a result, the blood vessels relax and widen. This lowers blood pressure.  These medicines also put more water and salt into the urine. This also lowers blood pressure. In heart failure, your heart does not pump as much blood as your body needs. These medicines:  · Make it easier for your heart to pump. · Help reduce symptoms. · Make a heart attack or stroke less likely. Examples  These medicines include:  · Benazepril (Lotensin). · Lisinopril (Prinivil, Zestril). · Ramipril (Altace). This is not a complete list.  Possible side effects  Side effects may include:  · Cough. · Low blood pressure. You may feel dizzy and weak. · High potassium levels. · An allergic reaction. You may have other side effects or reactions not listed here. Check the information that comes with your medicine. What to know about taking this medicine  · ACE inhibitors:  ¨ Are often used to treat heart failure. They may be the only medicine used if your only symptoms are feeling tired and mildly short of breath with no fluid buildup (edema). But in most other cases, they are used with other medicines. ¨ Can cause a dry cough. If the cough is bad, talk to your doctor. You may need to try a different medicine. ¨ Can relieve symptoms, improve how you feel, and make it less likely you will need to stay in a hospital. And they can reduce the risk of early death. ¨ Can cause an allergic reaction of the skin. Symptoms may range from mild swelling to painful welts. It is rare to have severe swelling that makes it hard to breathe. · Take your medicines exactly as prescribed. Call your doctor if you think you are having a problem with your medicine. · Check with your doctor or pharmacist before you use any other medicines. This includes over-the-counter medicines. Make sure your doctor knows all of the medicines, vitamins, herbal products, and supplements you take. Taking some medicines together can cause problems. · You should not take an ACE inhibitor if you are pregnant or planning to become pregnant.   · You may need regular blood tests.  Where can you learn more? Go to http://nacho-mathew.info/. Enter G699 in the search box to learn more about \"Learning About ACE Inhibitors for Heart Failure. \"  Current as of: September 21, 2016  Content Version: 11.4  © 0345-0093 [employer]. Care instructions adapted under license by [employer] (which disclaims liability or warranty for this information). If you have questions about a medical condition or this instruction, always ask your healthcare professional. [address] any warranty or liability for your use of this information. Anemia From Heavy Bleeding: Care Instructions  Your Care Instructions    Anemia means that your body does not have enough red blood cells. Red blood cells carry oxygen around the body. When you have anemia, you may feel dizzy, tired, and weak. You may also feel your heart pounding. For some people, it's hard to focus and think clearly. One common cause of anemia is bleeding. Bleeding from ulcers, hemorrhoids, cancer, or other problems can cause anemia. It may also be caused by heavy menstrual periods. Your treatment may include iron pills. Iron helps your body make hemoglobin. Hemoglobin is the part of the red blood cell that carries oxygen. If you have severe anemia, you may need a blood transfusion to give you red blood cells as quickly as possible. Sometimes it takes several months to get iron levels back to normal.  Follow-up care is a key part of your treatment and safety. Be sure to make and go to all appointments, and call your doctor if you are having problems. It's also a good idea to know your test results and keep a list of the medicines you take. How can you care for yourself at home? · Be safe with medicines. Take your medicines exactly as prescribed. Call your doctor if you think you are having a problem with your medicine.   · Follow your doctor's advice about eating foods that have a lot of iron in them. These include red meat, shellfish, poultry, and eggs. They also include beans, raisins, whole-grain bread, and leafy green vegetables. · Steam your vegetables. This is the best way to prepare them if you want to get as much iron as possible. · Iron pills can cause constipation. If you take them, there are things you can do to avoid constipation. Drink plenty of fluids, eat foods with a lot of fiber, and exercise every day. When should you call for help? Call 911 anytime you think you may need emergency care. For example, call if:  ? · You passed out (lost consciousness). ? · Your stools are maroon or very bloody. ?Call your doctor now or seek immediate medical care if:  ? · You are short of breath. ? · You have new or worse bleeding. ? · You are dizzy or light-headed, or you feel like you may faint. ? Watch closely for changes in your health, and be sure to contact your doctor if:  ? · You feel weaker or more tired than usual.   ? · You do not get better as expected. Where can you learn more? Go to http://nacho-mathew.info/. Enter S550 in the search box to learn more about \"Anemia From Heavy Bleeding: Care Instructions. \"  Current as of: October 13, 2016  Content Version: 11.4  © 0926-8649 [employer]. Care instructions adapted under license by [employer] (which disclaims liability or warranty for this information). If you have questions about a medical condition or this instruction, always ask your healthcare professional. [place] [address] any warranty or liability for your use of this information. Anemia From Heavy Bleeding: Care Instructions  Your Care Instructions    Anemia means that your body does not have enough red blood cells. Red blood cells carry oxygen around the body. When you have anemia, you may feel dizzy, tired, and weak. You may also feel your heart pounding.  For some people, it's hard to focus and think clearly. One common cause of anemia is bleeding. Bleeding from ulcers, hemorrhoids, cancer, or other problems can cause anemia. It may also be caused by heavy menstrual periods. Your treatment may include iron pills. Iron helps your body make hemoglobin. Hemoglobin is the part of the red blood cell that carries oxygen. If you have severe anemia, you may need a blood transfusion to give you red blood cells as quickly as possible. Sometimes it takes several months to get iron levels back to normal.  Follow-up care is a key part of your treatment and safety. Be sure to make and go to all appointments, and call your doctor if you are having problems. It's also a good idea to know your test results and keep a list of the medicines you take. How can you care for yourself at home? · Be safe with medicines. Take your medicines exactly as prescribed. Call your doctor if you think you are having a problem with your medicine. · Follow your doctor's advice about eating foods that have a lot of iron in them. These include red meat, shellfish, poultry, and eggs. They also include beans, raisins, whole-grain bread, and leafy green vegetables. · Steam your vegetables. This is the best way to prepare them if you want to get as much iron as possible. · Iron pills can cause constipation. If you take them, there are things you can do to avoid constipation. Drink plenty of fluids, eat foods with a lot of fiber, and exercise every day. When should you call for help? Call 911 anytime you think you may need emergency care. For example, call if:  ? · You passed out (lost consciousness). ? · Your stools are maroon or very bloody. ?Call your doctor now or seek immediate medical care if:  ? · You are short of breath. ? · You have new or worse bleeding. ? · You are dizzy or light-headed, or you feel like you may faint. ? Watch closely for changes in your health, and be sure to contact your doctor if:  ? · You feel weaker or more tired than usual.   ? · You do not get better as expected. Where can you learn more? Go to http://nacho-mathew.info/. Enter G056 in the search box to learn more about \"Anemia From Heavy Bleeding: Care Instructions. \"  Current as of: October 13, 2016  Content Version: 11.4  © [id number] [employer]. Care instructions adapted under license by "[employer]" (which disclaims liability or warranty for this information). If you have questions about a medical condition or this instruction, always ask your healthcare professional. [place] [address] any warranty or liability for your use of this information. Anemia: Care Instructions  Your Care Instructions    Anemia is a low level of red blood cells, which carry oxygen throughout your body. Many things can cause anemia. Lack of iron is one of the most common causes. Your body needs iron to make hemoglobin, a substance in red blood cells that carries oxygen from the lungs to your body's cells. Without enough iron, the body produces fewer and smaller red blood cells. As a result, your body's cells do not get enough oxygen, and you feel tired and weak. And you may have trouble concentrating. Bleeding is the most common cause of a lack of iron. You may have heavy menstrual bleeding or bleeding caused by conditions such as ulcers, hemorrhoids, or cancer. Regular use of aspirin or other anti-inflammatory medicines (such as ibuprofen) also can cause bleeding in some people. A lack of iron in your diet also can cause anemia, especially at times when the body needs more iron, such as during pregnancy, infancy, and the teen years. Your doctor may have prescribed iron pills. It may take several months of treatment for your iron levels to return to normal. Your doctor also may suggest that you eat foods that are rich in iron, such as meat and beans. There are many other causes of anemia.  It is not always due to a lack of iron. Finding the specific cause of your anemia will help your doctor find the right treatment for you. Follow-up care is a key part of your treatment and safety. Be sure to make and go to all appointments, and call your doctor if you are having problems. It's also a good idea to know your test results and keep a list of the medicines you take. How can you care for yourself at home? · Take your medicines exactly as prescribed. Call your doctor if you think you are having a problem with your medicine. · If your doctor recommends iron pills, take them as directed:  ¨ Try to take the pills on an empty stomach about 1 hour before or 2 hours after meals. But you may need to take iron with food to avoid an upset stomach. ¨ Do not take antacids or drink milk or caffeine drinks (such as coffee, tea, or cola) at the same time or within 2 hours of the time that you take your iron. They can make it hard for your body to absorb the iron. ¨ Vitamin C (from food or supplements) helps your body absorb iron. Try taking iron pills with a glass of orange juice or some other food that is high in vitamin C, such as citrus fruits. ¨ Iron pills may cause stomach problems, such as heartburn, nausea, diarrhea, constipation, and cramps. Be sure to drink plenty of fluids, and include fruits, vegetables, and fiber in your diet each day. Iron pills often make your bowel movements dark or green. ¨ If you forget to take an iron pill, do not take a double dose of iron the next time you take a pill. ¨ Keep iron pills out of the reach of small children. An overdose of iron can be very dangerous. · Follow your doctor's advice about eating iron-rich foods. These include red meat, shellfish, poultry, eggs, beans, raisins, whole-grain bread, and leafy green vegetables. · Steam vegetables to help them keep their iron content. When should you call for help? Call 911 anytime you think you may need emergency care. For example, call if:  ? · You have symptoms of a heart attack. These may include:  ¨ Chest pain or pressure, or a strange feeling in the chest.  ¨ Sweating. ¨ Shortness of breath. ¨ Nausea or vomiting. ¨ Pain, pressure, or a strange feeling in the back, neck, jaw, or upper belly or in one or both shoulders or arms. ¨ Lightheadedness or sudden weakness. ¨ A fast or irregular heartbeat. After you call 911, the  may tell you to chew 1 adult-strength or 2 to 4 low-dose aspirin. Wait for an ambulance. Do not try to drive yourself. ? · You passed out (lost consciousness). ?Call your doctor now or seek immediate medical care if:  ? · You have new or increased shortness of breath. ? · You are dizzy or lightheaded, or you feel like you may faint. ? · Your fatigue and weakness continue or get worse. ? · You have any abnormal bleeding, such as:  ¨ Nosebleeds. ¨ Vaginal bleeding that is different (heavier, more frequent, at a different time of the month) than what you are used to. ¨ Bloody or black stools, or rectal bleeding. ¨ Bloody or pink urine. ? Watch closely for changes in your health, and be sure to contact your doctor if:  ? · You do not get better as expected. Where can you learn more? Go to http://nacho-mathew.info/. Enter R301 in the search box to learn more about \"Anemia: Care Instructions. \"  Current as of: October 13, 2016  Content Version: 11.4  © 8069-3096 [employer]. Care instructions adapted under license by [employer] (which disclaims liability or warranty for this information). If you have questions about a medical condition or this instruction, always ask your healthcare professional. [place] [address] any warranty or liability for your use of this information. Anemia: Care Instructions  Your Care Instructions    Anemia is a low level of red blood cells, which carry oxygen throughout your body.  Many things can cause anemia. Lack of iron is one of the most common causes. Your body needs iron to make hemoglobin, a substance in red blood cells that carries oxygen from the lungs to your body's cells. Without enough iron, the body produces fewer and smaller red blood cells. As a result, your body's cells do not get enough oxygen, and you feel tired and weak. And you may have trouble concentrating. Bleeding is the most common cause of a lack of iron. You may have heavy menstrual bleeding or bleeding caused by conditions such as ulcers, hemorrhoids, or cancer. Regular use of aspirin or other anti-inflammatory medicines (such as ibuprofen) also can cause bleeding in some people. A lack of iron in your diet also can cause anemia, especially at times when the body needs more iron, such as during pregnancy, infancy, and the teen years. Your doctor may have prescribed iron pills. It may take several months of treatment for your iron levels to return to normal. Your doctor also may suggest that you eat foods that are rich in iron, such as meat and beans. There are many other causes of anemia. It is not always due to a lack of iron. Finding the specific cause of your anemia will help your doctor find the right treatment for you. Follow-up care is a key part of your treatment and safety. Be sure to make and go to all appointments, and call your doctor if you are having problems. It's also a good idea to know your test results and keep a list of the medicines you take. How can you care for yourself at home? · Take your medicines exactly as prescribed. Call your doctor if you think you are having a problem with your medicine. · If your doctor recommends iron pills, take them as directed:  ¨ Try to take the pills on an empty stomach about 1 hour before or 2 hours after meals. But you may need to take iron with food to avoid an upset stomach.   ¨ Do not take antacids or drink milk or caffeine drinks (such as coffee, tea, or cola) at the same time or within 2 hours of the time that you take your iron. They can make it hard for your body to absorb the iron. ¨ Vitamin C (from food or supplements) helps your body absorb iron. Try taking iron pills with a glass of orange juice or some other food that is high in vitamin C, such as citrus fruits. ¨ Iron pills may cause stomach problems, such as heartburn, nausea, diarrhea, constipation, and cramps. Be sure to drink plenty of fluids, and include fruits, vegetables, and fiber in your diet each day. Iron pills often make your bowel movements dark or green. ¨ If you forget to take an iron pill, do not take a double dose of iron the next time you take a pill. ¨ Keep iron pills out of the reach of small children. An overdose of iron can be very dangerous. · Follow your doctor's advice about eating iron-rich foods. These include red meat, shellfish, poultry, eggs, beans, raisins, whole-grain bread, and leafy green vegetables. · Steam vegetables to help them keep their iron content. When should you call for help? Call 911 anytime you think you may need emergency care. For example, call if:  ? · You have symptoms of a heart attack. These may include:  ¨ Chest pain or pressure, or a strange feeling in the chest.  ¨ Sweating. ¨ Shortness of breath. ¨ Nausea or vomiting. ¨ Pain, pressure, or a strange feeling in the back, neck, jaw, or upper belly or in one or both shoulders or arms. ¨ Lightheadedness or sudden weakness. ¨ A fast or irregular heartbeat. After you call 911, the  may tell you to chew 1 adult-strength or 2 to 4 low-dose aspirin. Wait for an ambulance. Do not try to drive yourself. ? · You passed out (lost consciousness). ?Call your doctor now or seek immediate medical care if:  ? · You have new or increased shortness of breath. ? · You are dizzy or lightheaded, or you feel like you may faint. ? · Your fatigue and weakness continue or get worse.    ? · You have any abnormal bleeding, such as:  ¨ Nosebleeds. ¨ Vaginal bleeding that is different (heavier, more frequent, at a different time of the month) than what you are used to. ¨ Bloody or black stools, or rectal bleeding. ¨ Bloody or pink urine. ? Watch closely for changes in your health, and be sure to contact your doctor if:  ? · You do not get better as expected. Where can you learn more? Go to http://nacho-mathew.info/. Enter R301 in the search box to learn more about \"Anemia: Care Instructions. \"  Current as of: October 13, 2016  Content Version: 11.4  © 4145-3105 [employer]. Care instructions adapted under license by [employer] (which disclaims liability or warranty for this information). If you have questions about a medical condition or this instruction, always ask your healthcare professional. [place] [address] any warranty or liability for your use of this information. Anemia: Care Instructions  Your Care Instructions    Anemia is a low level of red blood cells, which carry oxygen throughout your body. Many things can cause anemia. Lack of iron is one of the most common causes. Your body needs iron to make hemoglobin, a substance in red blood cells that carries oxygen from the lungs to your body's cells. Without enough iron, the body produces fewer and smaller red blood cells. As a result, your body's cells do not get enough oxygen, and you feel tired and weak. And you may have trouble concentrating. Bleeding is the most common cause of a lack of iron. You may have heavy menstrual bleeding or bleeding caused by conditions such as ulcers, hemorrhoids, or cancer. Regular use of aspirin or other anti-inflammatory medicines (such as ibuprofen) also can cause bleeding in some people. A lack of iron in your diet also can cause anemia, especially at times when the body needs more iron, such as during pregnancy, infancy, and the teen years.   Your doctor may have prescribed iron pills. It may take several months of treatment for your iron levels to return to normal. Your doctor also may suggest that you eat foods that are rich in iron, such as meat and beans. There are many other causes of anemia. It is not always due to a lack of iron. Finding the specific cause of your anemia will help your doctor find the right treatment for you. Follow-up care is a key part of your treatment and safety. Be sure to make and go to all appointments, and call your doctor if you are having problems. It's also a good idea to know your test results and keep a list of the medicines you take. How can you care for yourself at home? · Take your medicines exactly as prescribed. Call your doctor if you think you are having a problem with your medicine. · If your doctor recommends iron pills, take them as directed:  ¨ Try to take the pills on an empty stomach about 1 hour before or 2 hours after meals. But you may need to take iron with food to avoid an upset stomach. ¨ Do not take antacids or drink milk or caffeine drinks (such as coffee, tea, or cola) at the same time or within 2 hours of the time that you take your iron. They can make it hard for your body to absorb the iron. ¨ Vitamin C (from food or supplements) helps your body absorb iron. Try taking iron pills with a glass of orange juice or some other food that is high in vitamin C, such as citrus fruits. ¨ Iron pills may cause stomach problems, such as heartburn, nausea, diarrhea, constipation, and cramps. Be sure to drink plenty of fluids, and include fruits, vegetables, and fiber in your diet each day. Iron pills often make your bowel movements dark or green. ¨ If you forget to take an iron pill, do not take a double dose of iron the next time you take a pill. ¨ Keep iron pills out of the reach of small children. An overdose of iron can be very dangerous. · Follow your doctor's advice about eating iron-rich foods. These include red meat, shellfish, poultry, eggs, beans, raisins, whole-grain bread, and leafy green vegetables. · Steam vegetables to help them keep their iron content. When should you call for help? Call 911 anytime you think you may need emergency care. For example, call if:  ? · You have symptoms of a heart attack. These may include:  ¨ Chest pain or pressure, or a strange feeling in the chest.  ¨ Sweating. ¨ Shortness of breath. ¨ Nausea or vomiting. ¨ Pain, pressure, or a strange feeling in the back, neck, jaw, or upper belly or in one or both shoulders or arms. ¨ Lightheadedness or sudden weakness. ¨ A fast or irregular heartbeat. After you call 911, the  may tell you to chew 1 adult-strength or 2 to 4 low-dose aspirin. Wait for an ambulance. Do not try to drive yourself. ? · You passed out (lost consciousness). ?Call your doctor now or seek immediate medical care if:  ? · You have new or increased shortness of breath. ? · You are dizzy or lightheaded, or you feel like you may faint. ? · Your fatigue and weakness continue or get worse. ? · You have any abnormal bleeding, such as:  ¨ Nosebleeds. ¨ Vaginal bleeding that is different (heavier, more frequent, at a different time of the month) than what you are used to. ¨ Bloody or black stools, or rectal bleeding. ¨ Bloody or pink urine. ? Watch closely for changes in your health, and be sure to contact your doctor if:  ? · You do not get better as expected. Where can you learn more? Go to http://nacho-mathew.info/. Enter R301 in the search box to learn more about \"Anemia: Care Instructions. \"  Current as of: October 13, 2016  Content Version: 11.4  © 6564-4216 [employer]. Care instructions adapted under license by [employer] (which disclaims liability or warranty for this information).  If you have questions about a medical condition or this instruction, always ask your healthcare professional. [address] any warranty or liability for your use of this information. Atrial Fibrillation: Care Instructions  Your Care Instructions    Atrial fibrillation is an irregular and often fast heartbeat. Treating this condition is important for several reasons. It can cause blood clots, which can travel from your heart to your brain and cause a stroke. If you have a fast heartbeat, you may feel lightheaded, dizzy, and weak. An irregular heartbeat can also increase your risk for heart failure. Atrial fibrillation is often the result of another heart condition, such as high blood pressure or coronary artery disease. Making changes to improve your heart condition will help you stay healthy and active. Follow-up care is a key part of your treatment and safety. Be sure to make and go to all appointments, and call your doctor if you are having problems. It's also a good idea to know your test results and keep a list of the medicines you take. How can you care for yourself at home? Medicines  ? · Take your medicines exactly as prescribed. Call your doctor if you think you are having a problem with your medicine. You will get more details on the specific medicines your doctor prescribes. ? · If your doctor has given you a blood thinner to prevent a stroke, be sure you get instructions about how to take your medicine safely. Blood thinners can cause serious bleeding problems. ? · Do not take any vitamins, over-the-counter drugs, or herbal products without talking to your doctor first.   ? Lifestyle changes  ? · Do not smoke. Smoking can increase your chance of a stroke and heart attack. If you need help quitting, talk to your doctor about stop-smoking programs and medicines. These can increase your chances of quitting for good. ? · Eat a heart-healthy diet. ? · Stay at a healthy weight.  Lose weight if you need to.   ? · Limit alcohol to 2 drinks a day for men and 1 drink a day for women. Too much alcohol can cause health problems. ? · Avoid colds and flu. Get a pneumococcal vaccine shot. If you have had one before, ask your doctor whether you need another dose. Get a flu shot every year. If you must be around people with colds or flu, wash your hands often. Activity  ? · If your doctor recommends it, get more exercise. Walking is a good choice. Bit by bit, increase the amount you walk every day. Try for at least 30 minutes on most days of the week. You also may want to swim, bike, or do other activities. Your doctor may suggest that you join a cardiac rehabilitation program so that you can have help increasing your physical activity safely. ? · Start light exercise if your doctor says it is okay. Even a small amount will help you get stronger, have more energy, and manage stress. Walking is an easy way to get exercise. Start out by walking a little more than you did in the hospital. Gradually increase the amount you walk. ? · When you exercise, watch for signs that your heart is working too hard. You are pushing too hard if you cannot talk while you are exercising. If you become short of breath or dizzy or have chest pain, sit down and rest immediately. ? · Check your pulse regularly. Place two fingers on the artery at the palm side of your wrist, in line with your thumb. If your heartbeat seems uneven or fast, talk to your doctor. When should you call for help? Call 911 anytime you think you may need emergency care. For example, call if:  ? · You have symptoms of a heart attack. These may include:  ¨ Chest pain or pressure, or a strange feeling in the chest.  ¨ Sweating. ¨ Shortness of breath. ¨ Nausea or vomiting. ¨ Pain, pressure, or a strange feeling in the back, neck, jaw, or upper belly or in one or both shoulders or arms. ¨ Lightheadedness or sudden weakness. ¨ A fast or irregular heartbeat.   After you call 911, the  may tell you to chew 1 adult-strength or 2 to 4 low-dose aspirin. Wait for an ambulance. Do not try to drive yourself. ? · You have symptoms of a stroke. These may include:  ¨ Sudden numbness, tingling, weakness, or loss of movement in your face, arm, or leg, especially on only one side of your body. ¨ Sudden vision changes. ¨ Sudden trouble speaking. ¨ Sudden confusion or trouble understanding simple statements. ¨ Sudden problems with walking or balance. ¨ A sudden, severe headache that is different from past headaches. ? · You passed out (lost consciousness). ?Call your doctor now or seek immediate medical care if:  ? · You have new or increased shortness of breath. ? · You feel dizzy or lightheaded, or you feel like you may faint. ? · Your heart rate becomes irregular. ? · You can feel your heart flutter in your chest or skip heartbeats. Tell your doctor if these symptoms are new or worse. ? Watch closely for changes in your health, and be sure to contact your doctor if you have any problems. Where can you learn more? Go to http://nachoArtielle ImmunoTherapeuticsmathew.info/. Enter U020 in the search box to learn more about \"Atrial Fibrillation: Care Instructions. \"  Current as of: September 21, 2016  Content Version: 11.4  © 6183-5879 [employer]. Care instructions adapted under license by [employer] (which disclaims liability or warranty for this information). If you have questions about a medical condition or this instruction, always ask your healthcare professional. [place] [address] any warranty or liability for your use of this information. Atrial Fibrillation: Care Instructions  Your Care Instructions    Atrial fibrillation is an irregular and often fast heartbeat. Treating this condition is important for several reasons. It can cause blood clots, which can travel from your heart to your brain and cause a stroke.  If you have a fast heartbeat, you may feel lightheaded, dizzy, and weak. An irregular heartbeat can also increase your risk for heart failure. Atrial fibrillation is often the result of another heart condition, such as high blood pressure or coronary artery disease. Making changes to improve your heart condition will help you stay healthy and active. Follow-up care is a key part of your treatment and safety. Be sure to make and go to all appointments, and call your doctor if you are having problems. It's also a good idea to know your test results and keep a list of the medicines you take. How can you care for yourself at home? Medicines  ? · Take your medicines exactly as prescribed. Call your doctor if you think you are having a problem with your medicine. You will get more details on the specific medicines your doctor prescribes. ? · If your doctor has given you a blood thinner to prevent a stroke, be sure you get instructions about how to take your medicine safely. Blood thinners can cause serious bleeding problems. ? · Do not take any vitamins, over-the-counter drugs, or herbal products without talking to your doctor first.   ? Lifestyle changes  ? · Do not smoke. Smoking can increase your chance of a stroke and heart attack. If you need help quitting, talk to your doctor about stop-smoking programs and medicines. These can increase your chances of quitting for good. ? · Eat a heart-healthy diet. ? · Stay at a healthy weight. Lose weight if you need to.   ? · Limit alcohol to 2 drinks a day for men and 1 drink a day for women. Too much alcohol can cause health problems. ? · Avoid colds and flu. Get a pneumococcal vaccine shot. If you have had one before, ask your doctor whether you need another dose. Get a flu shot every year. If you must be around people with colds or flu, wash your hands often. Activity  ? · If your doctor recommends it, get more exercise. Walking is a good choice. Bit by bit, increase the amount you walk every day.  Try for at least 30 minutes on most days of the week. You also may want to swim, bike, or do other activities. Your doctor may suggest that you join a cardiac rehabilitation program so that you can have help increasing your physical activity safely. ? · Start light exercise if your doctor says it is okay. Even a small amount will help you get stronger, have more energy, and manage stress. Walking is an easy way to get exercise. Start out by walking a little more than you did in the hospital. Gradually increase the amount you walk. ? · When you exercise, watch for signs that your heart is working too hard. You are pushing too hard if you cannot talk while you are exercising. If you become short of breath or dizzy or have chest pain, sit down and rest immediately. ? · Check your pulse regularly. Place two fingers on the artery at the palm side of your wrist, in line with your thumb. If your heartbeat seems uneven or fast, talk to your doctor. When should you call for help? Call 911 anytime you think you may need emergency care. For example, call if:  ? · You have symptoms of a heart attack. These may include:  ¨ Chest pain or pressure, or a strange feeling in the chest.  ¨ Sweating. ¨ Shortness of breath. ¨ Nausea or vomiting. ¨ Pain, pressure, or a strange feeling in the back, neck, jaw, or upper belly or in one or both shoulders or arms. ¨ Lightheadedness or sudden weakness. ¨ A fast or irregular heartbeat. After you call 911, the  may tell you to chew 1 adult-strength or 2 to 4 low-dose aspirin. Wait for an ambulance. Do not try to drive yourself. ? · You have symptoms of a stroke. These may include:  ¨ Sudden numbness, tingling, weakness, or loss of movement in your face, arm, or leg, especially on only one side of your body. ¨ Sudden vision changes. ¨ Sudden trouble speaking. ¨ Sudden confusion or trouble understanding simple statements. ¨ Sudden problems with walking or balance.   ¨ A sudden, severe headache that is different from past headaches. ? · You passed out (lost consciousness). ?Call your doctor now or seek immediate medical care if:  ? · You have new or increased shortness of breath. ? · You feel dizzy or lightheaded, or you feel like you may faint. ? · Your heart rate becomes irregular. ? · You can feel your heart flutter in your chest or skip heartbeats. Tell your doctor if these symptoms are new or worse. ? Watch closely for changes in your health, and be sure to contact your doctor if you have any problems. Where can you learn more? Go to http://nacho-mathew.info/. Enter U020 in the search box to learn more about \"Atrial Fibrillation: Care Instructions. \"  Current as of: September 21, 2016  Content Version: 11.4  © 4496-6611 [employer]. Care instructions adapted under license by [employer] (which disclaims liability or warranty for this information). If you have questions about a medical condition or this instruction, always ask your healthcare professional. [place] [address] any warranty or liability for your use of this information. Atrial Fibrillation: Care Instructions  Your Care Instructions    Atrial fibrillation is an irregular and often fast heartbeat. Treating this condition is important for several reasons. It can cause blood clots, which can travel from your heart to your brain and cause a stroke. If you have a fast heartbeat, you may feel lightheaded, dizzy, and weak. An irregular heartbeat can also increase your risk for heart failure. Atrial fibrillation is often the result of another heart condition, such as high blood pressure or coronary artery disease. Making changes to improve your heart condition will help you stay healthy and active. Follow-up care is a key part of your treatment and safety.  Be sure to make and go to all appointments, and call your doctor if you are having problems. It's also a good idea to know your test results and keep a list of the medicines you take. How can you care for yourself at home? Medicines  ? · Take your medicines exactly as prescribed. Call your doctor if you think you are having a problem with your medicine. You will get more details on the specific medicines your doctor prescribes. ? · If your doctor has given you a blood thinner to prevent a stroke, be sure you get instructions about how to take your medicine safely. Blood thinners can cause serious bleeding problems. ? · Do not take any vitamins, over-the-counter drugs, or herbal products without talking to your doctor first.   ? Lifestyle changes  ? · Do not smoke. Smoking can increase your chance of a stroke and heart attack. If you need help quitting, talk to your doctor about stop-smoking programs and medicines. These can increase your chances of quitting for good. ? · Eat a heart-healthy diet. ? · Stay at a healthy weight. Lose weight if you need to.   ? · Limit alcohol to 2 drinks a day for men and 1 drink a day for women. Too much alcohol can cause health problems. ? · Avoid colds and flu. Get a pneumococcal vaccine shot. If you have had one before, ask your doctor whether you need another dose. Get a flu shot every year. If you must be around people with colds or flu, wash your hands often. Activity  ? · If your doctor recommends it, get more exercise. Walking is a good choice. Bit by bit, increase the amount you walk every day. Try for at least 30 minutes on most days of the week. You also may want to swim, bike, or do other activities. Your doctor may suggest that you join a cardiac rehabilitation program so that you can have help increasing your physical activity safely. ? · Start light exercise if your doctor says it is okay. Even a small amount will help you get stronger, have more energy, and manage stress. Walking is an easy way to get exercise.  Start out by walking a little more than you did in the hospital. Gradually increase the amount you walk. ? · When you exercise, watch for signs that your heart is working too hard. You are pushing too hard if you cannot talk while you are exercising. If you become short of breath or dizzy or have chest pain, sit down and rest immediately. ? · Check your pulse regularly. Place two fingers on the artery at the palm side of your wrist, in line with your thumb. If your heartbeat seems uneven or fast, talk to your doctor. When should you call for help? Call 911 anytime you think you may need emergency care. For example, call if:  ? · You have symptoms of a heart attack. These may include:  ¨ Chest pain or pressure, or a strange feeling in the chest.  ¨ Sweating. ¨ Shortness of breath. ¨ Nausea or vomiting. ¨ Pain, pressure, or a strange feeling in the back, neck, jaw, or upper belly or in one or both shoulders or arms. ¨ Lightheadedness or sudden weakness. ¨ A fast or irregular heartbeat. After you call 911, the  may tell you to chew 1 adult-strength or 2 to 4 low-dose aspirin. Wait for an ambulance. Do not try to drive yourself. ? · You have symptoms of a stroke. These may include:  ¨ Sudden numbness, tingling, weakness, or loss of movement in your face, arm, or leg, especially on only one side of your body. ¨ Sudden vision changes. ¨ Sudden trouble speaking. ¨ Sudden confusion or trouble understanding simple statements. ¨ Sudden problems with walking or balance. ¨ A sudden, severe headache that is different from past headaches. ? · You passed out (lost consciousness). ?Call your doctor now or seek immediate medical care if:  ? · You have new or increased shortness of breath. ? · You feel dizzy or lightheaded, or you feel like you may faint. ? · Your heart rate becomes irregular. ? · You can feel your heart flutter in your chest or skip heartbeats. Tell your doctor if these symptoms are new or worse. ?Watch closely for changes in your health, and be sure to contact your doctor if you have any problems. Where can you learn more? Go to http://nacho-mathew.info/. Enter U020 in the search box to learn more about \"Atrial Fibrillation: Care Instructions. \"  Current as of: September 21, 2016  Content Version: 11.4  © 6862-8622 [employer]. Care instructions adapted under license by [employer] (which disclaims liability or warranty for this information). If you have questions about a medical condition or this instruction, always ask your healthcare professional. [address] any warranty or liability for your use of this information. Atrial Fibrillation: Care Instructions  Your Care Instructions    Atrial fibrillation is an irregular and often fast heartbeat. Treating this condition is important for several reasons. It can cause blood clots, which can travel from your heart to your brain and cause a stroke. If you have a fast heartbeat, you may feel lightheaded, dizzy, and weak. An irregular heartbeat can also increase your risk for heart failure. Atrial fibrillation is often the result of another heart condition, such as high blood pressure or coronary artery disease. Making changes to improve your heart condition will help you stay healthy and active. Follow-up care is a key part of your treatment and safety. Be sure to make and go to all appointments, and call your doctor if you are having problems. It's also a good idea to know your test results and keep a list of the medicines you take. How can you care for yourself at home? Medicines  ? · Take your medicines exactly as prescribed. Call your doctor if you think you are having a problem with your medicine. You will get more details on the specific medicines your doctor prescribes.    ? · If your doctor has given you a blood thinner to prevent a stroke, be sure you get instructions about how to take your medicine safely. Blood thinners can cause serious bleeding problems. ? · Do not take any vitamins, over-the-counter drugs, or herbal products without talking to your doctor first.   ? Lifestyle changes  ? · Do not smoke. Smoking can increase your chance of a stroke and heart attack. If you need help quitting, talk to your doctor about stop-smoking programs and medicines. These can increase your chances of quitting for good. ? · Eat a heart-healthy diet. ? · Stay at a healthy weight. Lose weight if you need to.   ? · Limit alcohol to 2 drinks a day for men and 1 drink a day for women. Too much alcohol can cause health problems. ? · Avoid colds and flu. Get a pneumococcal vaccine shot. If you have had one before, ask your doctor whether you need another dose. Get a flu shot every year. If you must be around people with colds or flu, wash your hands often. Activity  ? · If your doctor recommends it, get more exercise. Walking is a good choice. Bit by bit, increase the amount you walk every day. Try for at least 30 minutes on most days of the week. You also may want to swim, bike, or do other activities. Your doctor may suggest that you join a cardiac rehabilitation program so that you can have help increasing your physical activity safely. ? · Start light exercise if your doctor says it is okay. Even a small amount will help you get stronger, have more energy, and manage stress. Walking is an easy way to get exercise. Start out by walking a little more than you did in the hospital. Gradually increase the amount you walk. ? · When you exercise, watch for signs that your heart is working too hard. You are pushing too hard if you cannot talk while you are exercising. If you become short of breath or dizzy or have chest pain, sit down and rest immediately. ? · Check your pulse regularly. Place two fingers on the artery at the palm side of your wrist, in line with your thumb.  If your heartbeat seems uneven or fast, talk to your doctor. When should you call for help? Call 911 anytime you think you may need emergency care. For example, call if:  ? · You have symptoms of a heart attack. These may include:  ¨ Chest pain or pressure, or a strange feeling in the chest.  ¨ Sweating. ¨ Shortness of breath. ¨ Nausea or vomiting. ¨ Pain, pressure, or a strange feeling in the back, neck, jaw, or upper belly or in one or both shoulders or arms. ¨ Lightheadedness or sudden weakness. ¨ A fast or irregular heartbeat. After you call 911, the  may tell you to chew 1 adult-strength or 2 to 4 low-dose aspirin. Wait for an ambulance. Do not try to drive yourself. ? · You have symptoms of a stroke. These may include:  ¨ Sudden numbness, tingling, weakness, or loss of movement in your face, arm, or leg, especially on only one side of your body. ¨ Sudden vision changes. ¨ Sudden trouble speaking. ¨ Sudden confusion or trouble understanding simple statements. ¨ Sudden problems with walking or balance. ¨ A sudden, severe headache that is different from past headaches. ? · You passed out (lost consciousness). ?Call your doctor now or seek immediate medical care if:  ? · You have new or increased shortness of breath. ? · You feel dizzy or lightheaded, or you feel like you may faint. ? · Your heart rate becomes irregular. ? · You can feel your heart flutter in your chest or skip heartbeats. Tell your doctor if these symptoms are new or worse. ? Watch closely for changes in your health, and be sure to contact your doctor if you have any problems. Where can you learn more? Go to http://nacho-mathew.info/. Enter U020 in the search box to learn more about \"Atrial Fibrillation: Care Instructions. \"  Current as of: September 21, 2016  Content Version: 11.4  © 9369-6226 [employer].  Care instructions adapted under license by [employer] (which disclaims liability or warranty for this information). If you have questions about a medical condition or this instruction, always ask your healthcare professional. [address] any warranty or liability for your use of this information. Atrial Fibrillation: Care Instructions  Your Care Instructions    Atrial fibrillation is an irregular and often fast heartbeat. Treating this condition is important for several reasons. It can cause blood clots, which can travel from your heart to your brain and cause a stroke. If you have a fast heartbeat, you may feel lightheaded, dizzy, and weak. An irregular heartbeat can also increase your risk for heart failure. Atrial fibrillation is often the result of another heart condition, such as high blood pressure or coronary artery disease. Making changes to improve your heart condition will help you stay healthy and active. Follow-up care is a key part of your treatment and safety. Be sure to make and go to all appointments, and call your doctor if you are having problems. It's also a good idea to know your test results and keep a list of the medicines you take. How can you care for yourself at home? Medicines  ? · Take your medicines exactly as prescribed. Call your doctor if you think you are having a problem with your medicine. You will get more details on the specific medicines your doctor prescribes. ? · If your doctor has given you a blood thinner to prevent a stroke, be sure you get instructions about how to take your medicine safely. Blood thinners can cause serious bleeding problems. ? · Do not take any vitamins, over-the-counter drugs, or herbal products without talking to your doctor first.   ? Lifestyle changes  ? · Do not smoke. Smoking can increase your chance of a stroke and heart attack. If you need help quitting, talk to your doctor about stop-smoking programs and medicines. These can increase your chances of quitting for good. ? · Eat a heart-healthy diet. ? · Stay at a healthy weight. Lose weight if you need to.   ? · Limit alcohol to 2 drinks a day for men and 1 drink a day for women. Too much alcohol can cause health problems. ? · Avoid colds and flu. Get a pneumococcal vaccine shot. If you have had one before, ask your doctor whether you need another dose. Get a flu shot every year. If you must be around people with colds or flu, wash your hands often. Activity  ? · If your doctor recommends it, get more exercise. Walking is a good choice. Bit by bit, increase the amount you walk every day. Try for at least 30 minutes on most days of the week. You also may want to swim, bike, or do other activities. Your doctor may suggest that you join a cardiac rehabilitation program so that you can have help increasing your physical activity safely. ? · Start light exercise if your doctor says it is okay. Even a small amount will help you get stronger, have more energy, and manage stress. Walking is an easy way to get exercise. Start out by walking a little more than you did in the hospital. Gradually increase the amount you walk. ? · When you exercise, watch for signs that your heart is working too hard. You are pushing too hard if you cannot talk while you are exercising. If you become short of breath or dizzy or have chest pain, sit down and rest immediately. ? · Check your pulse regularly. Place two fingers on the artery at the palm side of your wrist, in line with your thumb. If your heartbeat seems uneven or fast, talk to your doctor. When should you call for help? Call 911 anytime you think you may need emergency care. For example, call if:  ? · You have symptoms of a heart attack. These may include:  ¨ Chest pain or pressure, or a strange feeling in the chest.  ¨ Sweating. ¨ Shortness of breath. ¨ Nausea or vomiting.   ¨ Pain, pressure, or a strange feeling in the back, neck, jaw, or upper belly or in one or both shoulders or arms. ¨ Lightheadedness or sudden weakness. ¨ A fast or irregular heartbeat. After you call 911, the  may tell you to chew 1 adult-strength or 2 to 4 low-dose aspirin. Wait for an ambulance. Do not try to drive yourself. ? · You have symptoms of a stroke. These may include:  ¨ Sudden numbness, tingling, weakness, or loss of movement in your face, arm, or leg, especially on only one side of your body. ¨ Sudden vision changes. ¨ Sudden trouble speaking. ¨ Sudden confusion or trouble understanding simple statements. ¨ Sudden problems with walking or balance. ¨ A sudden, severe headache that is different from past headaches. ? · You passed out (lost consciousness). ?Call your doctor now or seek immediate medical care if:  ? · You have new or increased shortness of breath. ? · You feel dizzy or lightheaded, or you feel like you may faint. ? · Your heart rate becomes irregular. ? · You can feel your heart flutter in your chest or skip heartbeats. Tell your doctor if these symptoms are new or worse. ? Watch closely for changes in your health, and be sure to contact your doctor if you have any problems. Where can you learn more? Go to http://nacho-mathew.info/. Enter U020 in the search box to learn more about \"Atrial Fibrillation: Care Instructions. \"  Current as of: September 21, 2016  Content Version: 11.4  © 9360-1562 [employer]. Care instructions adapted under license by [employer] (which disclaims liability or warranty for this information). If you have questions about a medical condition or this instruction, always ask your healthcare professional. [address] any warranty or liability for your use of this information.

## 2018-04-28 NOTE — PROGRESS NOTES
Care Management Interventions  Occupational Therapy Consult: Yes  Current Support Network: Relative's Home  Sat. 4/28/2018 Spoke with patient and son. Patient wants a rollator and would like to get it locally before returning to [facility]. [employer] is listed as a contract supplier on [employer] Directory for [facility]. Faxed order to [employer]. [employer] is closed today. Son Maryland Frisk to call [employer] on Monday to see if they can supply the rollator. Family has a walker patient can use until she obtains rollator.

## 2018-04-30 NOTE — PHYSICIAN ADVISORY
Letter of Determination: Inpatient Status Appropriate    This patient was originally hospitalized as Inpatient Status on 4/27/2018 for atrial fibrillation with rapid ventricular response. This patient is appropriate for Inpatient Admission in accordance with CMS regulation Section 43 .3. Specifically, patient's stay is expected to be more than Two Midnights and was medically necessary. The patient's stay was medically necessary based on extreme advanced age, vital sign instability with blood pressure of 105/44 mmHg, respiratory rate of 31 breaths per minute, hemoglobin 7.3 mg/dl, and transfusion of two units of packed red blood cells. The patient was treated with intravenous diltiazem by continuous infusion. Consistent with CMS guidelines, patient meets for inpatient status. The patient improved more rapidly than anticipated and was able to be discharged after only one midnight in the hospital.    It is our recommendation that this patient's hospitalization status should be INPATIENT status.      The final decision regarding the patient's hospitalization status depends on the attending physician's judgement.     Ethan Guzman MD, CRISTÓBAL,   Physician Maldonado Ocasio.

## 2019-01-01 ENCOUNTER — HOME CARE VISIT (OUTPATIENT)
Dept: SCHEDULING | Facility: HOME HEALTH | Age: 84
End: 2019-01-01
Payer: MEDICARE

## 2019-01-01 ENCOUNTER — HOME HEALTH ADMISSION (OUTPATIENT)
Dept: HOME HEALTH SERVICES | Facility: HOME HEALTH | Age: 84
End: 2019-01-01

## 2019-01-01 ENCOUNTER — HOSPITAL ENCOUNTER (OUTPATIENT)
Dept: INTERVENTIONAL RADIOLOGY/VASCULAR | Age: 84
Discharge: HOME | End: 2019-02-22
Attending: RADIOLOGY
Payer: MEDICARE

## 2019-01-01 ENCOUNTER — HOME CARE VISIT (OUTPATIENT)
Dept: HOSPICE | Facility: HOSPICE | Age: 84
End: 2019-01-01
Payer: MEDICARE

## 2019-01-01 ENCOUNTER — HOSPITAL ENCOUNTER (INPATIENT)
Age: 84
LOS: 13 days | Discharge: HOSPICE | DRG: 871 | End: 2019-02-26
Attending: EMERGENCY MEDICINE | Admitting: INTERNAL MEDICINE
Payer: MEDICARE

## 2019-01-01 ENCOUNTER — PATIENT OUTREACH (OUTPATIENT)
Dept: CASE MANAGEMENT | Age: 84
End: 2019-01-01

## 2019-01-01 ENCOUNTER — APPOINTMENT (OUTPATIENT)
Dept: GENERAL RADIOLOGY | Age: 84
DRG: 871 | End: 2019-01-01
Attending: PHYSICIAN ASSISTANT
Payer: MEDICARE

## 2019-01-01 ENCOUNTER — ANESTHESIA (OUTPATIENT)
Dept: ENDOSCOPY | Age: 84
DRG: 871 | End: 2019-01-01
Payer: MEDICARE

## 2019-01-01 ENCOUNTER — HOME VISIT (OUTPATIENT)
Dept: HOSPICE | Age: 84
End: 2019-01-01

## 2019-01-01 ENCOUNTER — APPOINTMENT (OUTPATIENT)
Dept: ULTRASOUND IMAGING | Age: 84
DRG: 871 | End: 2019-01-01
Attending: FAMILY MEDICINE
Payer: MEDICARE

## 2019-01-01 ENCOUNTER — ANESTHESIA EVENT (OUTPATIENT)
Dept: ENDOSCOPY | Age: 84
DRG: 871 | End: 2019-01-01
Payer: MEDICARE

## 2019-01-01 ENCOUNTER — HOSPITAL ENCOUNTER (OUTPATIENT)
Dept: ULTRASOUND IMAGING | Age: 84
Discharge: HOME | End: 2019-02-22
Attending: RADIOLOGY
Payer: MEDICARE

## 2019-01-01 ENCOUNTER — APPOINTMENT (OUTPATIENT)
Dept: CT IMAGING | Age: 84
DRG: 871 | End: 2019-01-01
Attending: INTERNAL MEDICINE
Payer: MEDICARE

## 2019-01-01 ENCOUNTER — HOSPICE ADMISSION (OUTPATIENT)
Dept: HOSPICE | Facility: HOSPICE | Age: 84
End: 2019-01-01
Payer: MEDICARE

## 2019-01-01 ENCOUNTER — HOSPITAL ENCOUNTER (OUTPATIENT)
Age: 84
Setting detail: OUTPATIENT SURGERY
Discharge: STILL ADMITTED OR ADMINISTRATIVE | DRG: 871 | End: 2019-02-19
Attending: INTERNAL MEDICINE | Admitting: INTERNAL MEDICINE
Payer: MEDICARE

## 2019-01-01 ENCOUNTER — APPOINTMENT (OUTPATIENT)
Dept: GENERAL RADIOLOGY | Age: 84
DRG: 871 | End: 2019-01-01
Attending: EMERGENCY MEDICINE
Payer: MEDICARE

## 2019-01-01 VITALS
HEART RATE: 100 BPM | SYSTOLIC BLOOD PRESSURE: 110 MMHG | RESPIRATION RATE: 20 BRPM | DIASTOLIC BLOOD PRESSURE: 70 MMHG | TEMPERATURE: 97.7 F

## 2019-01-01 VITALS — DIASTOLIC BLOOD PRESSURE: 70 MMHG | HEART RATE: 80 BPM | SYSTOLIC BLOOD PRESSURE: 120 MMHG | RESPIRATION RATE: 20 BRPM

## 2019-01-01 VITALS
DIASTOLIC BLOOD PRESSURE: 71 MMHG | HEIGHT: 66 IN | BODY MASS INDEX: 19.7 KG/M2 | RESPIRATION RATE: 18 BRPM | HEART RATE: 76 BPM | WEIGHT: 122.6 LBS | TEMPERATURE: 97.7 F | SYSTOLIC BLOOD PRESSURE: 124 MMHG | OXYGEN SATURATION: 99 %

## 2019-01-01 VITALS — SYSTOLIC BLOOD PRESSURE: 116 MMHG | DIASTOLIC BLOOD PRESSURE: 55 MMHG | RESPIRATION RATE: 18 BRPM | HEART RATE: 76 BPM

## 2019-01-01 VITALS
RESPIRATION RATE: 15 BRPM | OXYGEN SATURATION: 99 % | HEART RATE: 84 BPM | DIASTOLIC BLOOD PRESSURE: 71 MMHG | TEMPERATURE: 96.8 F | SYSTOLIC BLOOD PRESSURE: 117 MMHG

## 2019-01-01 VITALS
SYSTOLIC BLOOD PRESSURE: 116 MMHG | HEART RATE: 76 BPM | DIASTOLIC BLOOD PRESSURE: 55 MMHG | RESPIRATION RATE: 16 BRPM | TEMPERATURE: 98.4 F

## 2019-01-01 VITALS
HEART RATE: 84 BPM | RESPIRATION RATE: 20 BRPM | SYSTOLIC BLOOD PRESSURE: 110 MMHG | TEMPERATURE: 98.5 F | DIASTOLIC BLOOD PRESSURE: 60 MMHG | OXYGEN SATURATION: 99 %

## 2019-01-01 VITALS
RESPIRATION RATE: 16 BRPM | RESPIRATION RATE: 20 BRPM | SYSTOLIC BLOOD PRESSURE: 80 MMHG | HEART RATE: 80 BPM | DIASTOLIC BLOOD PRESSURE: 60 MMHG | DIASTOLIC BLOOD PRESSURE: 60 MMHG | TEMPERATURE: 98.5 F | SYSTOLIC BLOOD PRESSURE: 110 MMHG | TEMPERATURE: 97.7 F | HEART RATE: 78 BPM

## 2019-01-01 VITALS
DIASTOLIC BLOOD PRESSURE: 70 MMHG | HEART RATE: 71 BPM | OXYGEN SATURATION: 95 % | RESPIRATION RATE: 16 BRPM | SYSTOLIC BLOOD PRESSURE: 127 MMHG | TEMPERATURE: 97.6 F

## 2019-01-01 VITALS — RESPIRATION RATE: 16 BRPM | SYSTOLIC BLOOD PRESSURE: 92 MMHG | DIASTOLIC BLOOD PRESSURE: 64 MMHG | HEART RATE: 112 BPM

## 2019-01-01 VITALS — DIASTOLIC BLOOD PRESSURE: 56 MMHG | HEART RATE: 140 BPM | RESPIRATION RATE: 22 BRPM | SYSTOLIC BLOOD PRESSURE: 86 MMHG

## 2019-01-01 VITALS
SYSTOLIC BLOOD PRESSURE: 103 MMHG | TEMPERATURE: 98.5 F | RESPIRATION RATE: 16 BRPM | DIASTOLIC BLOOD PRESSURE: 45 MMHG | HEART RATE: 81 BPM

## 2019-01-01 DIAGNOSIS — F41.9 ANXIETY: ICD-10-CM

## 2019-01-01 DIAGNOSIS — K63.9 CECAL LESION: ICD-10-CM

## 2019-01-01 DIAGNOSIS — D64.9 SEVERE ANEMIA: ICD-10-CM

## 2019-01-01 DIAGNOSIS — J90 PLEURAL EFFUSION ON LEFT: ICD-10-CM

## 2019-01-01 DIAGNOSIS — J90 PLEURAL EFFUSION: ICD-10-CM

## 2019-01-01 DIAGNOSIS — R55 SYNCOPE AND COLLAPSE: ICD-10-CM

## 2019-01-01 DIAGNOSIS — I48.91 ATRIAL FIBRILLATION WITH RAPID VENTRICULAR RESPONSE (HCC): ICD-10-CM

## 2019-01-01 DIAGNOSIS — Z90.2 S/P LOBECTOMY OF LUNG: ICD-10-CM

## 2019-01-01 DIAGNOSIS — I50.23 SYSTOLIC CHF, ACUTE ON CHRONIC (HCC): Chronic | ICD-10-CM

## 2019-01-01 DIAGNOSIS — R77.8 ELEVATED TROPONIN: ICD-10-CM

## 2019-01-01 DIAGNOSIS — I50.23 SYSTOLIC CHF, ACUTE ON CHRONIC (HCC): Primary | Chronic | ICD-10-CM

## 2019-01-01 DIAGNOSIS — N18.30 STAGE 3 CHRONIC KIDNEY DISEASE (HCC): ICD-10-CM

## 2019-01-01 DIAGNOSIS — Z90.2 S/P LOBECTOMY OF LUNG: Chronic | ICD-10-CM

## 2019-01-01 DIAGNOSIS — R16.0 LIVER MASS: ICD-10-CM

## 2019-01-01 DIAGNOSIS — C80.1 ADENOCARCINOMA (HCC): ICD-10-CM

## 2019-01-01 DIAGNOSIS — Z90.5 S/P NEPHRECTOMY: ICD-10-CM

## 2019-01-01 DIAGNOSIS — Z85.528 H/O RENAL CELL CARCINOMA: ICD-10-CM

## 2019-01-01 DIAGNOSIS — N18.30 CKD (CHRONIC KIDNEY DISEASE) STAGE 3, GFR 30-59 ML/MIN (HCC): Chronic | ICD-10-CM

## 2019-01-01 DIAGNOSIS — R91.8 INFILTRATE OF LEFT LUNG PRESENT ON CHEST X-RAY: Primary | ICD-10-CM

## 2019-01-01 DIAGNOSIS — C18.4 MALIGNANT NEOPLASM OF TRANSVERSE COLON (HCC): ICD-10-CM

## 2019-01-01 DIAGNOSIS — D64.9 CHRONIC ANEMIA: Chronic | ICD-10-CM

## 2019-01-01 LAB
ABO + RH BLD: NORMAL
ABO + RH BLD: NORMAL
AFP-TM SERPL-MCNC: 5.6 NG/ML
ALBUMIN SERPL-MCNC: 1 G/DL (ref 3.2–4.6)
ALBUMIN SERPL-MCNC: 1.3 G/DL (ref 3.2–4.6)
ALBUMIN/GLOB SERPL: 0.3 {RATIO}
ALBUMIN/GLOB SERPL: 0.3 {RATIO}
ALP SERPL-CCNC: 142 U/L (ref 50–136)
ALP SERPL-CCNC: 199 U/L (ref 50–130)
ALT SERPL-CCNC: 18 U/L (ref 12–65)
ALT SERPL-CCNC: 25 U/L (ref 12–65)
ANION GAP SERPL CALC-SCNC: 10 MMOL/L
ANION GAP SERPL CALC-SCNC: 10 MMOL/L
ANION GAP SERPL CALC-SCNC: 11 MMOL/L
ANION GAP SERPL CALC-SCNC: 11 MMOL/L
ANION GAP SERPL CALC-SCNC: 12 MMOL/L
ANION GAP SERPL CALC-SCNC: 16 MMOL/L
ANION GAP SERPL CALC-SCNC: 7 MMOL/L
ANION GAP SERPL CALC-SCNC: 7 MMOL/L
ANION GAP SERPL CALC-SCNC: 8 MMOL/L
ANION GAP SERPL CALC-SCNC: 9 MMOL/L
ANION GAP SERPL CALC-SCNC: 9 MMOL/L
APPEARANCE FLD: CLEAR
APPEARANCE FLD: NORMAL
APPEARANCE UR: CLEAR
AST SERPL-CCNC: 22 U/L (ref 15–37)
AST SERPL-CCNC: 64 U/L (ref 15–37)
ATRIAL RATE: 74 BPM
BACTERIA SPEC CULT: NORMAL
BACTERIA URNS QL MICRO: 0 /HPF
BASOPHILS # BLD: 0 K/UL (ref 0–0.2)
BASOPHILS # BLD: 0.1 K/UL (ref 0–0.2)
BASOPHILS NFR BLD: 0 % (ref 0–2)
BASOPHILS NFR BLD: 1 % (ref 0–2)
BILIRUB SERPL-MCNC: 0.2 MG/DL (ref 0.2–1.1)
BILIRUB SERPL-MCNC: 0.4 MG/DL (ref 0.2–1.1)
BILIRUB UR QL: ABNORMAL
BLD PROD TYP BPU: NORMAL
BLD PROD TYP BPU: NORMAL
BLOOD GROUP ANTIBODIES SERPL: NORMAL
BLOOD GROUP ANTIBODIES SERPL: NORMAL
BNP SERPL-MCNC: 110 PG/ML
BNP SERPL-MCNC: 236 PG/ML
BNP SERPL-MCNC: 309 PG/ML
BPU ID: NORMAL
BPU ID: NORMAL
BUN SERPL-MCNC: 11 MG/DL (ref 8–23)
BUN SERPL-MCNC: 11 MG/DL (ref 8–23)
BUN SERPL-MCNC: 12 MG/DL (ref 8–23)
BUN SERPL-MCNC: 14 MG/DL (ref 8–23)
BUN SERPL-MCNC: 17 MG/DL (ref 8–23)
BUN SERPL-MCNC: 22 MG/DL (ref 8–23)
BUN SERPL-MCNC: 24 MG/DL (ref 8–23)
BUN SERPL-MCNC: 29 MG/DL (ref 8–23)
BUN SERPL-MCNC: 29 MG/DL (ref 8–23)
BUN SERPL-MCNC: 7 MG/DL (ref 8–23)
BUN SERPL-MCNC: 8 MG/DL (ref 8–23)
BUN SERPL-MCNC: 9 MG/DL (ref 8–23)
C DIFF GDH STL QL: NORMAL
C DIFF TOX A+B STL QL IA: NORMAL
CALCIUM SERPL-MCNC: 6.5 MG/DL (ref 8.3–10.4)
CALCIUM SERPL-MCNC: 7.1 MG/DL (ref 8.3–10.4)
CALCIUM SERPL-MCNC: 7.1 MG/DL (ref 8.3–10.4)
CALCIUM SERPL-MCNC: 7.3 MG/DL (ref 8.3–10.4)
CALCIUM SERPL-MCNC: 7.3 MG/DL (ref 8.3–10.4)
CALCIUM SERPL-MCNC: 7.4 MG/DL (ref 8.3–10.4)
CALCIUM SERPL-MCNC: 7.5 MG/DL (ref 8.3–10.4)
CALCIUM SERPL-MCNC: 7.5 MG/DL (ref 8.3–10.4)
CALCIUM SERPL-MCNC: 7.6 MG/DL (ref 8.3–10.4)
CALCIUM SERPL-MCNC: 7.8 MG/DL (ref 8.3–10.4)
CALCIUM SERPL-MCNC: 8 MG/DL (ref 8.3–10.4)
CALCIUM SERPL-MCNC: 8.2 MG/DL (ref 8.3–10.4)
CALCULATED R AXIS, ECG10: 37 DEGREES
CALCULATED T AXIS, ECG11: -124 DEGREES
CANCER AG19-9 SERPL-ACNC: 2.6 U/ML (ref 2–37)
CASTS URNS QL MICRO: ABNORMAL /LPF
CEA SERPL-MCNC: 97 NG/ML (ref 0–3)
CHLORIDE SERPL-SCNC: 108 MMOL/L (ref 98–107)
CHLORIDE SERPL-SCNC: 109 MMOL/L (ref 98–107)
CHLORIDE SERPL-SCNC: 111 MMOL/L (ref 98–107)
CHLORIDE SERPL-SCNC: 112 MMOL/L (ref 98–107)
CHLORIDE SERPL-SCNC: 113 MMOL/L (ref 98–107)
CHLORIDE SERPL-SCNC: 113 MMOL/L (ref 98–107)
CLINICAL CONSIDERATION: NORMAL
CO2 SERPL-SCNC: 18 MMOL/L (ref 21–32)
CO2 SERPL-SCNC: 20 MMOL/L (ref 21–32)
CO2 SERPL-SCNC: 20 MMOL/L (ref 21–32)
CO2 SERPL-SCNC: 21 MMOL/L (ref 21–32)
CO2 SERPL-SCNC: 21 MMOL/L (ref 21–32)
CO2 SERPL-SCNC: 22 MMOL/L (ref 21–32)
CO2 SERPL-SCNC: 23 MMOL/L (ref 21–32)
CO2 SERPL-SCNC: 25 MMOL/L (ref 21–32)
CO2 SERPL-SCNC: 25 MMOL/L (ref 21–32)
CO2 SERPL-SCNC: 26 MMOL/L (ref 21–32)
COLOR FLD: NORMAL
COLOR FLD: NORMAL
COLOR UR: YELLOW
CREAT SERPL-MCNC: 1.24 MG/DL (ref 0.6–1)
CREAT SERPL-MCNC: 1.28 MG/DL (ref 0.6–1)
CREAT SERPL-MCNC: 1.3 MG/DL (ref 0.6–1)
CREAT SERPL-MCNC: 1.31 MG/DL (ref 0.6–1)
CREAT SERPL-MCNC: 1.34 MG/DL (ref 0.6–1)
CREAT SERPL-MCNC: 1.4 MG/DL (ref 0.6–1)
CREAT SERPL-MCNC: 1.41 MG/DL (ref 0.6–1)
CREAT SERPL-MCNC: 1.41 MG/DL (ref 0.6–1)
CREAT SERPL-MCNC: 1.42 MG/DL (ref 0.6–1)
CREAT SERPL-MCNC: 1.42 MG/DL (ref 0.6–1)
CREAT SERPL-MCNC: 1.43 MG/DL (ref 0.6–1)
CREAT SERPL-MCNC: 1.46 MG/DL (ref 0.6–1)
CREAT SERPL-MCNC: 1.47 MG/DL (ref 0.6–1)
CREAT SERPL-MCNC: 1.64 MG/DL (ref 0.6–1)
CROSSMATCH RESULT,%XM: NORMAL
CROSSMATCH RESULT,%XM: NORMAL
DIAGNOSIS, 93000: NORMAL
DIFFERENTIAL METHOD BLD: ABNORMAL
DIGOXIN SERPL-MCNC: <0.1 NG/ML (ref 0.9–2.1)
EOSINOPHIL # BLD: 0.1 K/UL (ref 0–0.8)
EOSINOPHIL # BLD: 0.2 K/UL (ref 0–0.8)
EOSINOPHIL # BLD: 0.2 K/UL (ref 0–0.8)
EOSINOPHIL # BLD: 0.3 K/UL (ref 0–0.8)
EOSINOPHIL # BLD: 0.4 K/UL (ref 0–0.8)
EOSINOPHIL # BLD: 0.4 K/UL (ref 0–0.8)
EOSINOPHIL NFR BLD: 0 % (ref 0.5–7.8)
EOSINOPHIL NFR BLD: 1 % (ref 0.5–7.8)
EOSINOPHIL NFR BLD: 2 % (ref 0.5–7.8)
EOSINOPHIL NFR BLD: 3 % (ref 0.5–7.8)
EOSINOPHIL NFR BLD: 3 % (ref 0.5–7.8)
EOSINOPHIL NFR BLD: 4 % (ref 0.5–7.8)
EPI CELLS #/AREA URNS HPF: ABNORMAL /HPF
ERYTHROCYTE [DISTWIDTH] IN BLOOD BY AUTOMATED COUNT: 19.7 % (ref 11.9–14.6)
ERYTHROCYTE [DISTWIDTH] IN BLOOD BY AUTOMATED COUNT: 19.9 % (ref 11.9–14.6)
ERYTHROCYTE [DISTWIDTH] IN BLOOD BY AUTOMATED COUNT: 20 % (ref 11.9–14.6)
ERYTHROCYTE [DISTWIDTH] IN BLOOD BY AUTOMATED COUNT: 20.1 % (ref 11.9–14.6)
ERYTHROCYTE [DISTWIDTH] IN BLOOD BY AUTOMATED COUNT: 20.2 % (ref 11.9–14.6)
ERYTHROCYTE [DISTWIDTH] IN BLOOD BY AUTOMATED COUNT: 20.3 % (ref 11.9–14.6)
ERYTHROCYTE [DISTWIDTH] IN BLOOD BY AUTOMATED COUNT: 20.4 % (ref 11.9–14.6)
ERYTHROCYTE [DISTWIDTH] IN BLOOD BY AUTOMATED COUNT: 21.2 % (ref 11.9–14.6)
ERYTHROCYTE [DISTWIDTH] IN BLOOD BY AUTOMATED COUNT: 21.4 % (ref 11.9–14.6)
FERRITIN SERPL-MCNC: 47 NG/ML (ref 8–388)
FLOW CYTOMETRY, FBTC1: NORMAL
GLOBULIN SER CALC-MCNC: 3.3 G/DL (ref 2.3–3.5)
GLOBULIN SER CALC-MCNC: 4.8 G/DL (ref 2.3–3.5)
GLUCOSE FLD-MCNC: NORMAL MG/DL
GLUCOSE SERPL-MCNC: 54 MG/DL (ref 65–100)
GLUCOSE SERPL-MCNC: 58 MG/DL (ref 65–100)
GLUCOSE SERPL-MCNC: 62 MG/DL (ref 65–100)
GLUCOSE SERPL-MCNC: 64 MG/DL (ref 65–100)
GLUCOSE SERPL-MCNC: 65 MG/DL (ref 65–100)
GLUCOSE SERPL-MCNC: 67 MG/DL (ref 65–100)
GLUCOSE SERPL-MCNC: 72 MG/DL (ref 65–100)
GLUCOSE SERPL-MCNC: 73 MG/DL (ref 65–100)
GLUCOSE SERPL-MCNC: 74 MG/DL (ref 65–100)
GLUCOSE SERPL-MCNC: 77 MG/DL (ref 65–100)
GLUCOSE SERPL-MCNC: 80 MG/DL (ref 65–100)
GLUCOSE SERPL-MCNC: 82 MG/DL (ref 65–100)
GLUCOSE SERPL-MCNC: 83 MG/DL (ref 65–100)
GLUCOSE SERPL-MCNC: 89 MG/DL (ref 65–100)
GLUCOSE UR STRIP.AUTO-MCNC: NEGATIVE MG/DL
GRAM STN SPEC: NORMAL
GRAM STN SPEC: NORMAL
HCT VFR BLD AUTO: 23.5 % (ref 35.8–46.3)
HCT VFR BLD AUTO: 29.1 % (ref 35.8–46.3)
HCT VFR BLD AUTO: 29.7 % (ref 35.8–46.3)
HCT VFR BLD AUTO: 32.1 % (ref 35.8–46.3)
HCT VFR BLD AUTO: 33 % (ref 35.8–46.3)
HCT VFR BLD AUTO: 33.7 % (ref 35.8–46.3)
HCT VFR BLD AUTO: 34.3 % (ref 35.8–46.3)
HCT VFR BLD AUTO: 37.2 % (ref 35.8–46.3)
HCT VFR BLD AUTO: 37.7 % (ref 35.8–46.3)
HCT VFR BLD AUTO: 37.8 % (ref 35.8–46.3)
HCT VFR BLD AUTO: 38.2 % (ref 35.8–46.3)
HCT VFR BLD AUTO: 39.9 % (ref 35.8–46.3)
HCT VFR BLD AUTO: 40.4 % (ref 35.8–46.3)
HEMOCCULT STL QL: POSITIVE
HGB BLD-MCNC: 10.1 G/DL (ref 11.7–15.4)
HGB BLD-MCNC: 10.3 G/DL (ref 11.7–15.4)
HGB BLD-MCNC: 10.4 G/DL (ref 11.7–15.4)
HGB BLD-MCNC: 11.2 G/DL (ref 11.7–15.4)
HGB BLD-MCNC: 11.4 G/DL (ref 11.7–15.4)
HGB BLD-MCNC: 11.5 G/DL (ref 11.7–15.4)
HGB BLD-MCNC: 11.5 G/DL (ref 11.7–15.4)
HGB BLD-MCNC: 12 G/DL (ref 11.7–15.4)
HGB BLD-MCNC: 12.2 G/DL (ref 11.7–15.4)
HGB BLD-MCNC: 7.2 G/DL (ref 11.7–15.4)
HGB BLD-MCNC: 9 G/DL (ref 11.7–15.4)
HGB BLD-MCNC: 9.1 G/DL (ref 11.7–15.4)
HGB BLD-MCNC: 9.8 G/DL (ref 11.7–15.4)
HGB UR QL STRIP: NEGATIVE
IMM GRANULOCYTES # BLD AUTO: 0.1 K/UL (ref 0–0.5)
IMM GRANULOCYTES # BLD AUTO: 0.2 K/UL (ref 0–0.5)
IMM GRANULOCYTES NFR BLD AUTO: 1 % (ref 0–5)
IMM GRANULOCYTES NFR BLD AUTO: 2 % (ref 0–5)
IMM GRANULOCYTES NFR BLD AUTO: 2 % (ref 0–5)
INTERPRETATION: NORMAL
IRON SATN MFR SERPL: 21 %
IRON SERPL-MCNC: 24 UG/DL
KETONES UR QL STRIP.AUTO: NEGATIVE MG/DL
LACTATE BLD-SCNC: 0.92 MMOL/L (ref 0.5–1.9)
LACTATE BLD-SCNC: 2.68 MMOL/L (ref 0.5–1.9)
LACTOFERRIN, LCTFLT: 21 UG/ML(G) (ref 0–7.24)
LDH FLD L TO P-CCNC: NORMAL U/L
LDH SERPL L TO P-CCNC: 495 U/L (ref 110–210)
LEUKOCYTE ESTERASE UR QL STRIP.AUTO: NEGATIVE
LIPASE SERPL-CCNC: 39 U/L (ref 73–393)
LYMPHOCYTES # BLD: 1.3 K/UL (ref 0.5–4.6)
LYMPHOCYTES # BLD: 1.3 K/UL (ref 0.5–4.6)
LYMPHOCYTES # BLD: 1.4 K/UL (ref 0.5–4.6)
LYMPHOCYTES # BLD: 1.5 K/UL (ref 0.5–4.6)
LYMPHOCYTES # BLD: 1.6 K/UL (ref 0.5–4.6)
LYMPHOCYTES NFR BLD: 10 % (ref 13–44)
LYMPHOCYTES NFR BLD: 11 % (ref 13–44)
LYMPHOCYTES NFR BLD: 12 % (ref 13–44)
LYMPHOCYTES NFR BLD: 13 % (ref 13–44)
LYMPHOCYTES NFR BLD: 13 % (ref 13–44)
LYMPHOCYTES NFR BLD: 14 % (ref 13–44)
LYMPHOCYTES NFR BLD: 9 % (ref 13–44)
LYMPHOCYTES NFR FLD: 86 %
MAGNESIUM SERPL-MCNC: 1.2 MG/DL (ref 1.8–2.4)
MAGNESIUM SERPL-MCNC: 1.3 MG/DL (ref 1.8–2.4)
MAGNESIUM SERPL-MCNC: 1.7 MG/DL (ref 1.8–2.4)
MAGNESIUM SERPL-MCNC: 1.8 MG/DL (ref 1.8–2.4)
MAGNESIUM SERPL-MCNC: 1.9 MG/DL (ref 1.8–2.4)
MAGNESIUM SERPL-MCNC: 2 MG/DL (ref 1.8–2.4)
MAGNESIUM SERPL-MCNC: 2 MG/DL (ref 1.8–2.4)
MAGNESIUM SERPL-MCNC: 2.1 MG/DL (ref 1.8–2.4)
MAGNESIUM SERPL-MCNC: 2.1 MG/DL (ref 1.8–2.4)
MCH RBC QN AUTO: 26.3 PG (ref 26.1–32.9)
MCH RBC QN AUTO: 26.3 PG (ref 26.1–32.9)
MCH RBC QN AUTO: 26.6 PG (ref 26.1–32.9)
MCH RBC QN AUTO: 26.7 PG (ref 26.1–32.9)
MCH RBC QN AUTO: 26.8 PG (ref 26.1–32.9)
MCH RBC QN AUTO: 26.9 PG (ref 26.1–32.9)
MCH RBC QN AUTO: 27 PG (ref 26.1–32.9)
MCH RBC QN AUTO: 27.3 PG (ref 26.1–32.9)
MCHC RBC AUTO-ENTMCNC: 29.6 G/DL (ref 31.4–35)
MCHC RBC AUTO-ENTMCNC: 30 G/DL (ref 31.4–35)
MCHC RBC AUTO-ENTMCNC: 30.1 G/DL (ref 31.4–35)
MCHC RBC AUTO-ENTMCNC: 30.1 G/DL (ref 31.4–35)
MCHC RBC AUTO-ENTMCNC: 30.2 G/DL (ref 31.4–35)
MCHC RBC AUTO-ENTMCNC: 30.5 G/DL (ref 31.4–35)
MCHC RBC AUTO-ENTMCNC: 30.6 G/DL (ref 31.4–35)
MCHC RBC AUTO-ENTMCNC: 30.9 G/DL (ref 31.4–35)
MCHC RBC AUTO-ENTMCNC: 30.9 G/DL (ref 31.4–35)
MCV RBC AUTO: 86.2 FL (ref 79.6–97.8)
MCV RBC AUTO: 86.4 FL (ref 79.6–97.8)
MCV RBC AUTO: 87.2 FL (ref 79.6–97.8)
MCV RBC AUTO: 87.3 FL (ref 79.6–97.8)
MCV RBC AUTO: 87.3 FL (ref 79.6–97.8)
MCV RBC AUTO: 87.4 FL (ref 79.6–97.8)
MCV RBC AUTO: 87.5 FL (ref 79.6–97.8)
MCV RBC AUTO: 88.4 FL (ref 79.6–97.8)
MCV RBC AUTO: 88.8 FL (ref 79.6–97.8)
MCV RBC AUTO: 89 FL (ref 79.6–97.8)
MCV RBC AUTO: 89.3 FL (ref 79.6–97.8)
MCV RBC AUTO: 90.9 FL (ref 79.6–97.8)
MONOCYTES # BLD: 0.5 K/UL (ref 0.1–1.3)
MONOCYTES # BLD: 0.6 K/UL (ref 0.1–1.3)
MONOCYTES # BLD: 0.7 K/UL (ref 0.1–1.3)
MONOCYTES NFR BLD: 3 % (ref 4–12)
MONOCYTES NFR BLD: 4 % (ref 4–12)
MONOCYTES NFR BLD: 5 % (ref 4–12)
MONOS+MACROS NFR FLD: 2 %
NEUTROPHILS NFR FLD: 12 %
NEUTS SEG # BLD: 10.1 K/UL (ref 1.7–8.2)
NEUTS SEG # BLD: 10.3 K/UL (ref 1.7–8.2)
NEUTS SEG # BLD: 10.8 K/UL (ref 1.7–8.2)
NEUTS SEG # BLD: 11.1 K/UL (ref 1.7–8.2)
NEUTS SEG # BLD: 11.4 K/UL (ref 1.7–8.2)
NEUTS SEG # BLD: 11.6 K/UL (ref 1.7–8.2)
NEUTS SEG # BLD: 12.8 K/UL (ref 1.7–8.2)
NEUTS SEG # BLD: 14.3 K/UL (ref 1.7–8.2)
NEUTS SEG # BLD: 8.9 K/UL (ref 1.7–8.2)
NEUTS SEG # BLD: 9 K/UL (ref 1.7–8.2)
NEUTS SEG # BLD: 9.1 K/UL (ref 1.7–8.2)
NEUTS SEG # BLD: 9.1 K/UL (ref 1.7–8.2)
NEUTS SEG NFR BLD: 77 % (ref 43–78)
NEUTS SEG NFR BLD: 78 % (ref 43–78)
NEUTS SEG NFR BLD: 79 % (ref 43–78)
NEUTS SEG NFR BLD: 81 % (ref 43–78)
NEUTS SEG NFR BLD: 81 % (ref 43–78)
NEUTS SEG NFR BLD: 82 % (ref 43–78)
NEUTS SEG NFR BLD: 82 % (ref 43–78)
NEUTS SEG NFR BLD: 83 % (ref 43–78)
NEUTS SEG NFR BLD: 83 % (ref 43–78)
NEUTS SEG NFR BLD: 87 % (ref 43–78)
NITRITE UR QL STRIP.AUTO: NEGATIVE
NRBC # BLD: 0 K/UL (ref 0–0.2)
NUC CELL # FLD: 211 /CU MM
NUC CELL # FLD: <100 /CU MM
PCR REFLEX: NORMAL
PH UR STRIP: 5.5 [PH] (ref 5–9)
PLATELET # BLD AUTO: 208 K/UL (ref 150–450)
PLATELET # BLD AUTO: 231 K/UL (ref 150–450)
PLATELET # BLD AUTO: 232 K/UL (ref 150–450)
PLATELET # BLD AUTO: 241 K/UL (ref 150–450)
PLATELET # BLD AUTO: 242 K/UL (ref 150–450)
PLATELET # BLD AUTO: 247 K/UL (ref 150–450)
PLATELET # BLD AUTO: 259 K/UL (ref 150–450)
PLATELET # BLD AUTO: 262 K/UL (ref 150–450)
PLATELET # BLD AUTO: 270 K/UL (ref 150–450)
PLATELET # BLD AUTO: 278 K/UL (ref 150–450)
PLATELET # BLD AUTO: 286 K/UL (ref 150–450)
PLATELET # BLD AUTO: 400 K/UL (ref 150–450)
PMV BLD AUTO: 10.1 FL (ref 9.4–12.3)
PMV BLD AUTO: 10.2 FL (ref 9.4–12.3)
PMV BLD AUTO: 10.2 FL (ref 9.4–12.3)
PMV BLD AUTO: 10.3 FL (ref 9.4–12.3)
PMV BLD AUTO: 10.3 FL (ref 9.4–12.3)
PMV BLD AUTO: 10.4 FL (ref 9.4–12.3)
PMV BLD AUTO: 10.5 FL (ref 9.4–12.3)
PMV BLD AUTO: 11.2 FL (ref 9.4–12.3)
PMV BLD AUTO: 9.7 FL (ref 9.4–12.3)
PMV BLD AUTO: 9.9 FL (ref 9.4–12.3)
POTASSIUM SERPL-SCNC: 3 MMOL/L (ref 3.5–5.1)
POTASSIUM SERPL-SCNC: 3.2 MMOL/L (ref 3.5–5.1)
POTASSIUM SERPL-SCNC: 3.4 MMOL/L (ref 3.5–5.1)
POTASSIUM SERPL-SCNC: 3.4 MMOL/L (ref 3.5–5.1)
POTASSIUM SERPL-SCNC: 3.6 MMOL/L (ref 3.5–5.1)
POTASSIUM SERPL-SCNC: 3.7 MMOL/L (ref 3.5–5.1)
POTASSIUM SERPL-SCNC: 3.9 MMOL/L (ref 3.5–5.1)
POTASSIUM SERPL-SCNC: 4 MMOL/L (ref 3.5–5.1)
POTASSIUM SERPL-SCNC: 4 MMOL/L (ref 3.5–5.1)
POTASSIUM SERPL-SCNC: 4.5 MMOL/L (ref 3.5–5.1)
POTASSIUM SERPL-SCNC: 4.6 MMOL/L (ref 3.5–5.1)
POTASSIUM SERPL-SCNC: 4.7 MMOL/L (ref 3.5–5.1)
PROCALCITONIN SERPL-MCNC: 0.1 NG/ML
PROT FLD-MCNC: NORMAL G/DL
PROT SERPL-MCNC: 4.3 G/DL
PROT SERPL-MCNC: 5.5 G/DL
PROT SERPL-MCNC: 6.1 G/DL
PROT UR STRIP-MCNC: ABNORMAL MG/DL
Q-T INTERVAL, ECG07: 298 MS
QRS DURATION, ECG06: 90 MS
QTC CALCULATION (BEZET), ECG08: 388 MS
RBC # BLD AUTO: 2.64 M/UL (ref 4.05–5.2)
RBC # BLD AUTO: 3.37 M/UL (ref 4.05–5.2)
RBC # BLD AUTO: 3.4 M/UL (ref 4.05–5.2)
RBC # BLD AUTO: 3.63 M/UL (ref 4.05–5.2)
RBC # BLD AUTO: 3.77 M/UL (ref 4.05–5.2)
RBC # BLD AUTO: 3.84 M/UL (ref 4.05–5.2)
RBC # BLD AUTO: 3.91 M/UL (ref 4.05–5.2)
RBC # BLD AUTO: 4.16 M/UL (ref 4.05–5.2)
RBC # BLD AUTO: 4.26 M/UL (ref 4.05–5.2)
RBC # BLD AUTO: 4.38 M/UL (ref 4.05–5.2)
RBC # BLD AUTO: 4.55 M/UL (ref 4.05–5.2)
RBC # BLD AUTO: 4.57 M/UL (ref 4.05–5.2)
RBC # FLD: 6000 /CU MM
RBC # FLD: <1000 /CU MM
RBC #/AREA URNS HPF: ABNORMAL /HPF
SERVICE CMNT-IMP: NORMAL
SODIUM SERPL-SCNC: 142 MMOL/L (ref 136–145)
SODIUM SERPL-SCNC: 143 MMOL/L (ref 136–145)
SODIUM SERPL-SCNC: 144 MMOL/L (ref 136–145)
SODIUM SERPL-SCNC: 145 MMOL/L (ref 136–145)
SODIUM SERPL-SCNC: 145 MMOL/L (ref 136–145)
SP GR UR REFRACTOMETRY: 1.02 (ref 1–1.02)
SPECIMEN EXP DATE BLD: NORMAL
SPECIMEN EXP DATE BLD: NORMAL
SPECIMEN SOURCE FLD: NORMAL
SPECIMEN SOURCE: NORMAL
STATUS OF UNIT,%ST: NORMAL
STATUS OF UNIT,%ST: NORMAL
TEST ORDERED:: NORMAL
TIBC SERPL-MCNC: 116 UG/DL (ref 250–450)
TROPONIN I BLD-MCNC: 0.08 NG/ML (ref 0.02–0.05)
TROPONIN I BLD-MCNC: 0.09 NG/ML (ref 0.02–0.05)
TROPONIN I SERPL-MCNC: 0.08 NG/ML (ref 0.02–0.05)
TROPONIN I SERPL-MCNC: 0.08 NG/ML (ref 0.02–0.05)
TSH SERPL DL<=0.005 MIU/L-ACNC: 2.01 UIU/ML
UNIT DIVISION, %UDIV: 0
UNIT DIVISION, %UDIV: 0
UROBILINOGEN UR QL STRIP.AUTO: 1 EU/DL (ref 0.2–1)
VANCOMYCIN TROUGH SERPL-MCNC: 8.5 UG/ML (ref 5–20)
VENTRICULAR RATE, ECG03: 102 BPM
WBC # BLD AUTO: 11.4 K/UL (ref 4.3–11.1)
WBC # BLD AUTO: 11.4 K/UL (ref 4.3–11.1)
WBC # BLD AUTO: 11.5 K/UL (ref 4.3–11.1)
WBC # BLD AUTO: 11.7 K/UL (ref 4.3–11.1)
WBC # BLD AUTO: 12.7 K/UL (ref 4.3–11.1)
WBC # BLD AUTO: 12.8 K/UL (ref 4.3–11.1)
WBC # BLD AUTO: 13.2 K/UL (ref 4.3–11.1)
WBC # BLD AUTO: 13.6 K/UL (ref 4.3–11.1)
WBC # BLD AUTO: 13.9 K/UL (ref 4.3–11.1)
WBC # BLD AUTO: 14 K/UL (ref 4.3–11.1)
WBC # BLD AUTO: 15.5 K/UL (ref 4.3–11.1)
WBC # BLD AUTO: 16.5 K/UL (ref 4.3–11.1)
WBC URNS QL MICRO: ABNORMAL /HPF

## 2019-01-01 PROCEDURE — 36415 COLL VENOUS BLD VENIPUNCTURE: CPT

## 2019-01-01 PROCEDURE — 74011250636 HC RX REV CODE- 250/636: Performed by: INTERNAL MEDICINE

## 2019-01-01 PROCEDURE — 86900 BLOOD TYPING SEROLOGIC ABO: CPT

## 2019-01-01 PROCEDURE — G0156 HHCP-SVS OF AIDE,EA 15 MIN: HCPCS

## 2019-01-01 PROCEDURE — 77030009426 HC FCPS BIOP ENDOSC BSC -B: Performed by: INTERNAL MEDICINE

## 2019-01-01 PROCEDURE — 74011000258 HC RX REV CODE- 258: Performed by: INTERNAL MEDICINE

## 2019-01-01 PROCEDURE — 80048 BASIC METABOLIC PNL TOTAL CA: CPT

## 2019-01-01 PROCEDURE — 0651 HSPC ROUTINE HOME CARE

## 2019-01-01 PROCEDURE — 3331090004 HSPC SERVICE INTENSITY ADD-ON

## 2019-01-01 PROCEDURE — 77030020263 HC SOL INJ SOD CL0.9% LFCR 1000ML

## 2019-01-01 PROCEDURE — 96365 THER/PROPH/DIAG IV INF INIT: CPT | Performed by: EMERGENCY MEDICINE

## 2019-01-01 PROCEDURE — 84157 ASSAY OF PROTEIN OTHER: CPT

## 2019-01-01 PROCEDURE — 74011250636 HC RX REV CODE- 250/636: Performed by: HOSPITALIST

## 2019-01-01 PROCEDURE — 83735 ASSAY OF MAGNESIUM: CPT

## 2019-01-01 PROCEDURE — 76705 ECHO EXAM OF ABDOMEN: CPT

## 2019-01-01 PROCEDURE — G0155 HHCP-SVS OF CSW,EA 15 MIN: HCPCS

## 2019-01-01 PROCEDURE — G0299 HHS/HOSPICE OF RN EA 15 MIN: HCPCS

## 2019-01-01 PROCEDURE — HHS10554 SHAMPOO/BODY WASH 8 OZ ALOE VESTA

## 2019-01-01 PROCEDURE — T4523 ADULT SIZE BRIEF/DIAPER LG: HCPCS

## 2019-01-01 PROCEDURE — 80053 COMPREHEN METABOLIC PANEL: CPT

## 2019-01-01 PROCEDURE — 82105 ALPHA-FETOPROTEIN SERUM: CPT

## 2019-01-01 PROCEDURE — 65270000029 HC RM PRIVATE

## 2019-01-01 PROCEDURE — 74011250637 HC RX REV CODE- 250/637: Performed by: FAMILY MEDICINE

## 2019-01-01 PROCEDURE — 85025 COMPLETE CBC W/AUTO DIFF WBC: CPT

## 2019-01-01 PROCEDURE — 84443 ASSAY THYROID STIM HORMONE: CPT

## 2019-01-01 PROCEDURE — A4927 NON-STERILE GLOVES: HCPCS

## 2019-01-01 PROCEDURE — 74011000258 HC RX REV CODE- 258: Performed by: FAMILY MEDICINE

## 2019-01-01 PROCEDURE — 74011250637 HC RX REV CODE- 250/637: Performed by: INTERNAL MEDICINE

## 2019-01-01 PROCEDURE — 89050 BODY FLUID CELL COUNT: CPT

## 2019-01-01 PROCEDURE — 77030019605

## 2019-01-01 PROCEDURE — 88112 CYTOPATH CELL ENHANCE TECH: CPT

## 2019-01-01 PROCEDURE — 74011250637 HC RX REV CODE- 250/637: Performed by: PHYSICIAN ASSISTANT

## 2019-01-01 PROCEDURE — 94760 N-INVAS EAR/PLS OXIMETRY 1: CPT

## 2019-01-01 PROCEDURE — A9270 NON-COVERED ITEM OR SERVICE: HCPCS

## 2019-01-01 PROCEDURE — P9047 ALBUMIN (HUMAN), 25%, 50ML: HCPCS | Performed by: INTERNAL MEDICINE

## 2019-01-01 PROCEDURE — 74011000250 HC RX REV CODE- 250

## 2019-01-01 PROCEDURE — 74011000255 HC RX REV CODE- 255: Performed by: INTERNAL MEDICINE

## 2019-01-01 PROCEDURE — T4524 ADULT SIZE BRIEF/DIAPER XL: HCPCS

## 2019-01-01 PROCEDURE — 83690 ASSAY OF LIPASE: CPT

## 2019-01-01 PROCEDURE — 82728 ASSAY OF FERRITIN: CPT

## 2019-01-01 PROCEDURE — 49083 ABD PARACENTESIS W/IMAGING: CPT

## 2019-01-01 PROCEDURE — 93005 ELECTROCARDIOGRAM TRACING: CPT | Performed by: EMERGENCY MEDICINE

## 2019-01-01 PROCEDURE — 87086 URINE CULTURE/COLONY COUNT: CPT

## 2019-01-01 PROCEDURE — 83540 ASSAY OF IRON: CPT

## 2019-01-01 PROCEDURE — 74011250636 HC RX REV CODE- 250/636: Performed by: FAMILY MEDICINE

## 2019-01-01 PROCEDURE — 74011250636 HC RX REV CODE- 250/636

## 2019-01-01 PROCEDURE — 97110 THERAPEUTIC EXERCISES: CPT

## 2019-01-01 PROCEDURE — 77030037400 HC ADH TISS HI VISC EXOFIN CHMP -B

## 2019-01-01 PROCEDURE — 74011636320 HC RX REV CODE- 636/320

## 2019-01-01 PROCEDURE — 83880 ASSAY OF NATRIURETIC PEPTIDE: CPT

## 2019-01-01 PROCEDURE — 88312 SPECIAL STAINS GROUP 1: CPT

## 2019-01-01 PROCEDURE — 86923 COMPATIBILITY TEST ELECTRIC: CPT

## 2019-01-01 PROCEDURE — 76060000031 HC ANESTHESIA FIRST 0.5 HR: Performed by: INTERNAL MEDICINE

## 2019-01-01 PROCEDURE — 88305 TISSUE EXAM BY PATHOLOGIST: CPT

## 2019-01-01 PROCEDURE — C8929 TTE W OR WO FOL WCON,DOPPLER: HCPCS

## 2019-01-01 PROCEDURE — 82272 OCCULT BLD FECES 1-3 TESTS: CPT

## 2019-01-01 PROCEDURE — A6250 SKIN SEAL PROTECT MOISTURIZR: HCPCS

## 2019-01-01 PROCEDURE — 0W9B3ZZ DRAINAGE OF LEFT PLEURAL CAVITY, PERCUTANEOUS APPROACH: ICD-10-PCS | Performed by: INTERNAL MEDICINE

## 2019-01-01 PROCEDURE — 82945 GLUCOSE OTHER FLUID: CPT

## 2019-01-01 PROCEDURE — 97161 PT EVAL LOW COMPLEX 20 MIN: CPT

## 2019-01-01 PROCEDURE — T4541 LARGE DISPOSABLE UNDERPAD: HCPCS

## 2019-01-01 PROCEDURE — 85018 HEMOGLOBIN: CPT

## 2019-01-01 PROCEDURE — 0DBL8ZX EXCISION OF TRANSVERSE COLON, VIA NATURAL OR ARTIFICIAL OPENING ENDOSCOPIC, DIAGNOSTIC: ICD-10-PCS | Performed by: INTERNAL MEDICINE

## 2019-01-01 PROCEDURE — 80202 ASSAY OF VANCOMYCIN: CPT

## 2019-01-01 PROCEDURE — 84145 PROCALCITONIN (PCT): CPT

## 2019-01-01 PROCEDURE — 74011250637 HC RX REV CODE- 250/637: Performed by: HOSPITALIST

## 2019-01-01 PROCEDURE — 74241 XR UPPER GI SERIES W KUB: CPT

## 2019-01-01 PROCEDURE — 32555 ASPIRATE PLEURA W/ IMAGING: CPT | Performed by: INTERNAL MEDICINE

## 2019-01-01 PROCEDURE — HOSPICE MEDICATION HC HH HOSPICE MEDICATION

## 2019-01-01 PROCEDURE — 77030014146 HC TY THORCENT/PARACENT BD -B

## 2019-01-01 PROCEDURE — 83615 LACTATE (LD) (LDH) ENZYME: CPT

## 2019-01-01 PROCEDURE — 76450000000

## 2019-01-01 PROCEDURE — 36430 TRANSFUSION BLD/BLD COMPNT: CPT

## 2019-01-01 PROCEDURE — 76060000032 HC ANESTHESIA 0.5 TO 1 HR: Performed by: INTERNAL MEDICINE

## 2019-01-01 PROCEDURE — 0DBK8ZX EXCISION OF ASCENDING COLON, VIA NATURAL OR ARTIFICIAL OPENING ENDOSCOPIC, DIAGNOSTIC: ICD-10-PCS | Performed by: INTERNAL MEDICINE

## 2019-01-01 PROCEDURE — 97530 THERAPEUTIC ACTIVITIES: CPT

## 2019-01-01 PROCEDURE — 84484 ASSAY OF TROPONIN QUANT: CPT

## 2019-01-01 PROCEDURE — 87449 NOS EACH ORGANISM AG IA: CPT

## 2019-01-01 PROCEDURE — P9016 RBC LEUKOCYTES REDUCED: HCPCS

## 2019-01-01 PROCEDURE — 99232 SBSQ HOSP IP/OBS MODERATE 35: CPT | Performed by: INTERNAL MEDICINE

## 2019-01-01 PROCEDURE — 71046 X-RAY EXAM CHEST 2 VIEWS: CPT

## 2019-01-01 PROCEDURE — 86301 IMMUNOASSAY TUMOR CA 19-9: CPT

## 2019-01-01 PROCEDURE — 77010033678 HC OXYGEN DAILY

## 2019-01-01 PROCEDURE — 84155 ASSAY OF PROTEIN SERUM: CPT

## 2019-01-01 PROCEDURE — 0DBH8ZX EXCISION OF CECUM, VIA NATURAL OR ARTIFICIAL OPENING ENDOSCOPIC, DIAGNOSTIC: ICD-10-PCS | Performed by: INTERNAL MEDICINE

## 2019-01-01 PROCEDURE — 99223 1ST HOSP IP/OBS HIGH 75: CPT | Performed by: INTERNAL MEDICINE

## 2019-01-01 PROCEDURE — 0DB78ZX EXCISION OF STOMACH, PYLORUS, VIA NATURAL OR ARTIFICIAL OPENING ENDOSCOPIC, DIAGNOSTIC: ICD-10-PCS | Performed by: INTERNAL MEDICINE

## 2019-01-01 PROCEDURE — 87040 BLOOD CULTURE FOR BACTERIA: CPT

## 2019-01-01 PROCEDURE — 74011250636 HC RX REV CODE- 250/636: Performed by: EMERGENCY MEDICINE

## 2019-01-01 PROCEDURE — 87205 SMEAR GRAM STAIN: CPT

## 2019-01-01 PROCEDURE — A6212 FOAM DRG <=16 SQ IN W/BORDER: HCPCS

## 2019-01-01 PROCEDURE — 74177 CT ABD & PELVIS W/CONTRAST: CPT

## 2019-01-01 PROCEDURE — 74011000258 HC RX REV CODE- 258: Performed by: EMERGENCY MEDICINE

## 2019-01-01 PROCEDURE — 76040000025: Performed by: INTERNAL MEDICINE

## 2019-01-01 PROCEDURE — 3336500001 HSPC ELECTION

## 2019-01-01 PROCEDURE — 99284 EMERGENCY DEPT VISIT MOD MDM: CPT | Performed by: EMERGENCY MEDICINE

## 2019-01-01 PROCEDURE — 74011250637 HC RX REV CODE- 250/637: Performed by: NURSE PRACTITIONER

## 2019-01-01 PROCEDURE — 77030032490 HC SLV COMPR SCD KNE COVD -B

## 2019-01-01 PROCEDURE — 82378 CARCINOEMBRYONIC ANTIGEN: CPT

## 2019-01-01 PROCEDURE — 83605 ASSAY OF LACTIC ACID: CPT

## 2019-01-01 PROCEDURE — 30233N1 TRANSFUSION OF NONAUTOLOGOUS RED BLOOD CELLS INTO PERIPHERAL VEIN, PERCUTANEOUS APPROACH: ICD-10-PCS | Performed by: INTERNAL MEDICINE

## 2019-01-01 PROCEDURE — 83631 LACTOFERRIN FECAL (QUANT): CPT

## 2019-01-01 PROCEDURE — 87045 FECES CULTURE AEROBIC BACT: CPT

## 2019-01-01 PROCEDURE — 81003 URINALYSIS AUTO W/O SCOPE: CPT

## 2019-01-01 PROCEDURE — 93971 EXTREMITY STUDY: CPT

## 2019-01-01 PROCEDURE — 74011636320 HC RX REV CODE- 636/320: Performed by: INTERNAL MEDICINE

## 2019-01-01 PROCEDURE — 77030039270 HC TU BLD FLTR CARD -A

## 2019-01-01 PROCEDURE — 76040000026: Performed by: INTERNAL MEDICINE

## 2019-01-01 PROCEDURE — 99233 SBSQ HOSP IP/OBS HIGH 50: CPT | Performed by: INTERNAL MEDICINE

## 2019-01-01 PROCEDURE — 80162 ASSAY OF DIGOXIN TOTAL: CPT

## 2019-01-01 PROCEDURE — 74011000250 HC RX REV CODE- 250: Performed by: INTERNAL MEDICINE

## 2019-01-01 RX ORDER — PROPOFOL 10 MG/ML
INJECTION, EMULSION INTRAVENOUS AS NEEDED
Status: DISCONTINUED | OUTPATIENT
Start: 2019-01-01 | End: 2019-01-01 | Stop reason: HOSPADM

## 2019-01-01 RX ORDER — DIGOXIN 125 MCG
0.12 TABLET ORAL DAILY
Status: DISCONTINUED | OUTPATIENT
Start: 2019-01-01 | End: 2019-01-01

## 2019-01-01 RX ORDER — DIGOXIN 125 MCG
0.12 TABLET ORAL DAILY
Status: DISCONTINUED | OUTPATIENT
Start: 2019-01-01 | End: 2019-01-01 | Stop reason: HOSPADM

## 2019-01-01 RX ORDER — ACETAMINOPHEN 325 MG/1
650 TABLET ORAL
Status: DISCONTINUED | OUTPATIENT
Start: 2019-01-01 | End: 2019-01-01 | Stop reason: HOSPADM

## 2019-01-01 RX ORDER — POTASSIUM CHLORIDE 14.9 MG/ML
20 INJECTION INTRAVENOUS
Status: DISCONTINUED | OUTPATIENT
Start: 2019-01-01 | End: 2019-01-01 | Stop reason: HOSPADM

## 2019-01-01 RX ORDER — PANTOPRAZOLE SODIUM 40 MG/1
40 TABLET, DELAYED RELEASE ORAL
Status: DISCONTINUED | OUTPATIENT
Start: 2019-01-01 | End: 2019-01-01

## 2019-01-01 RX ORDER — SODIUM CHLORIDE 9 MG/ML
250 INJECTION, SOLUTION INTRAVENOUS CONTINUOUS
Status: DISCONTINUED | OUTPATIENT
Start: 2019-01-01 | End: 2019-01-01

## 2019-01-01 RX ORDER — OXYCODONE HYDROCHLORIDE 5 MG/1
5 TABLET ORAL
Status: DISCONTINUED | OUTPATIENT
Start: 2019-01-01 | End: 2019-01-01 | Stop reason: HOSPADM

## 2019-01-01 RX ORDER — PROPOFOL 10 MG/ML
INJECTION, EMULSION INTRAVENOUS
Status: DISCONTINUED | OUTPATIENT
Start: 2019-01-01 | End: 2019-01-01 | Stop reason: HOSPADM

## 2019-01-01 RX ORDER — ALBUTEROL SULFATE 90 UG/1
2 AEROSOL, METERED RESPIRATORY (INHALATION)
COMMUNITY
Start: 2019-01-01

## 2019-01-01 RX ORDER — SODIUM CHLORIDE, SODIUM LACTATE, POTASSIUM CHLORIDE, CALCIUM CHLORIDE 600; 310; 30; 20 MG/100ML; MG/100ML; MG/100ML; MG/100ML
100 INJECTION, SOLUTION INTRAVENOUS CONTINUOUS
Status: DISCONTINUED | OUTPATIENT
Start: 2019-01-01 | End: 2019-01-01

## 2019-01-01 RX ORDER — OXYCODONE HYDROCHLORIDE 5 MG/1
5 TABLET ORAL
Status: DISCONTINUED | OUTPATIENT
Start: 2019-01-01 | End: 2019-01-01

## 2019-01-01 RX ORDER — GUAIFENESIN 100 MG/5ML
81 LIQUID (ML) ORAL DAILY
Status: DISCONTINUED | OUTPATIENT
Start: 2019-01-01 | End: 2019-01-01 | Stop reason: HOSPADM

## 2019-01-01 RX ORDER — SODIUM CHLORIDE 9 MG/ML
INJECTION, SOLUTION INTRAVENOUS
Status: DISCONTINUED | OUTPATIENT
Start: 2019-01-01 | End: 2019-01-01 | Stop reason: HOSPADM

## 2019-01-01 RX ORDER — SODIUM CHLORIDE 9 MG/ML
629 INJECTION, SOLUTION INTRAVENOUS CONTINUOUS
Status: DISCONTINUED | OUTPATIENT
Start: 2019-01-01 | End: 2019-01-01

## 2019-01-01 RX ORDER — SODIUM CHLORIDE 9 MG/ML
25 INJECTION, SOLUTION INTRAVENOUS CONTINUOUS
Status: DISCONTINUED | OUTPATIENT
Start: 2019-01-01 | End: 2019-01-01 | Stop reason: HOSPADM

## 2019-01-01 RX ORDER — ONDANSETRON 2 MG/ML
4 INJECTION INTRAMUSCULAR; INTRAVENOUS
Status: DISCONTINUED | OUTPATIENT
Start: 2019-01-01 | End: 2019-01-01 | Stop reason: HOSPADM

## 2019-01-01 RX ORDER — LANOLIN ALCOHOL/MO/W.PET/CERES
400 CREAM (GRAM) TOPICAL 2 TIMES DAILY
Status: DISCONTINUED | OUTPATIENT
Start: 2019-01-01 | End: 2019-01-01 | Stop reason: HOSPADM

## 2019-01-01 RX ORDER — SODIUM CHLORIDE, SODIUM LACTATE, POTASSIUM CHLORIDE, CALCIUM CHLORIDE 600; 310; 30; 20 MG/100ML; MG/100ML; MG/100ML; MG/100ML
100 INJECTION, SOLUTION INTRAVENOUS CONTINUOUS
Status: DISCONTINUED | OUTPATIENT
Start: 2019-01-01 | End: 2019-01-01 | Stop reason: HOSPADM

## 2019-01-01 RX ORDER — DILTIAZEM HYDROCHLORIDE 120 MG/1
120 CAPSULE, COATED, EXTENDED RELEASE ORAL DAILY
Status: DISCONTINUED | OUTPATIENT
Start: 2019-01-01 | End: 2019-01-01

## 2019-01-01 RX ORDER — BISACODYL 5 MG
10 TABLET, DELAYED RELEASE (ENTERIC COATED) ORAL
Status: COMPLETED | OUTPATIENT
Start: 2019-01-01 | End: 2019-01-01

## 2019-01-01 RX ORDER — SODIUM CHLORIDE 0.9 % (FLUSH) 0.9 %
5-10 SYRINGE (ML) INJECTION AS NEEDED
Status: DISCONTINUED | OUTPATIENT
Start: 2019-01-01 | End: 2019-01-01 | Stop reason: HOSPADM

## 2019-01-01 RX ORDER — SODIUM CHLORIDE, SODIUM LACTATE, POTASSIUM CHLORIDE, CALCIUM CHLORIDE 600; 310; 30; 20 MG/100ML; MG/100ML; MG/100ML; MG/100ML
1000 INJECTION, SOLUTION INTRAVENOUS CONTINUOUS
Status: DISCONTINUED | OUTPATIENT
Start: 2019-01-01 | End: 2019-01-01 | Stop reason: HOSPADM

## 2019-01-01 RX ORDER — MAGNESIUM SULFATE HEPTAHYDRATE 40 MG/ML
4 INJECTION, SOLUTION INTRAVENOUS ONCE
Status: COMPLETED | OUTPATIENT
Start: 2019-01-01 | End: 2019-01-01

## 2019-01-01 RX ORDER — SODIUM CHLORIDE 9 MG/ML
25 INJECTION, SOLUTION INTRAVENOUS CONTINUOUS
Status: DISCONTINUED | OUTPATIENT
Start: 2019-01-01 | End: 2019-01-01

## 2019-01-01 RX ORDER — FUROSEMIDE 10 MG/ML
20 INJECTION INTRAMUSCULAR; INTRAVENOUS EVERY 12 HOURS
Status: DISCONTINUED | OUTPATIENT
Start: 2019-01-01 | End: 2019-01-01

## 2019-01-01 RX ORDER — LORAZEPAM 1 MG/1
1 TABLET ORAL
Qty: 30 TAB | Refills: 0 | Status: SHIPPED | OUTPATIENT
Start: 2019-01-01 | End: 2019-01-01 | Stop reason: SDUPTHER

## 2019-01-01 RX ORDER — ACETAMINOPHEN 325 MG/1
650 TABLET ORAL ONCE
Status: COMPLETED | OUTPATIENT
Start: 2019-01-01 | End: 2019-01-01

## 2019-01-01 RX ORDER — SODIUM CHLORIDE 0.9 % (FLUSH) 0.9 %
10 SYRINGE (ML) INJECTION
Status: COMPLETED | OUTPATIENT
Start: 2019-01-01 | End: 2019-01-01

## 2019-01-01 RX ORDER — DILTIAZEM HYDROCHLORIDE 120 MG/1
120 CAPSULE, COATED, EXTENDED RELEASE ORAL DAILY
Status: DISCONTINUED | OUTPATIENT
Start: 2019-01-01 | End: 2019-01-01 | Stop reason: HOSPADM

## 2019-01-01 RX ORDER — DILTIAZEM HYDROCHLORIDE 120 MG/1
120 CAPSULE, COATED, EXTENDED RELEASE ORAL DAILY
Refills: 3 | COMMUNITY
Start: 2019-01-01 | End: 2019-01-01 | Stop reason: ALTCHOICE

## 2019-01-01 RX ORDER — POLYETHYLENE GLYCOL 3350 17 G/17G
238 POWDER, FOR SOLUTION ORAL ONCE
Status: COMPLETED | OUTPATIENT
Start: 2019-01-01 | End: 2019-01-01

## 2019-01-01 RX ORDER — FUROSEMIDE 40 MG/1
40 TABLET ORAL DAILY
Status: DISCONTINUED | OUTPATIENT
Start: 2019-01-01 | End: 2019-01-01

## 2019-01-01 RX ORDER — SODIUM CHLORIDE 9 MG/ML
250 INJECTION, SOLUTION INTRAVENOUS CONTINUOUS
Status: ACTIVE | OUTPATIENT
Start: 2019-01-01 | End: 2019-01-01

## 2019-01-01 RX ORDER — PANTOPRAZOLE SODIUM 40 MG/1
40 TABLET, DELAYED RELEASE ORAL
Status: DISCONTINUED | OUTPATIENT
Start: 2019-01-01 | End: 2019-01-01 | Stop reason: HOSPADM

## 2019-01-01 RX ORDER — DEXTROSE 40 %
15 GEL (GRAM) ORAL AS NEEDED
Status: DISCONTINUED | OUTPATIENT
Start: 2019-01-01 | End: 2019-01-01 | Stop reason: HOSPADM

## 2019-01-01 RX ORDER — DILTIAZEM HYDROCHLORIDE 60 MG/1
120 TABLET, FILM COATED ORAL DAILY
Status: DISCONTINUED | OUTPATIENT
Start: 2019-01-01 | End: 2019-01-01 | Stop reason: SDUPTHER

## 2019-01-01 RX ORDER — SIMVASTATIN 10 MG/1
10 TABLET, FILM COATED ORAL
Status: DISCONTINUED | OUTPATIENT
Start: 2019-01-01 | End: 2019-01-01

## 2019-01-01 RX ORDER — SODIUM CHLORIDE 9 MG/ML
50 INJECTION, SOLUTION INTRAVENOUS CONTINUOUS
Status: DISCONTINUED | OUTPATIENT
Start: 2019-01-01 | End: 2019-01-01 | Stop reason: HOSPADM

## 2019-01-01 RX ORDER — VANCOMYCIN HYDROCHLORIDE
1250 ONCE
Status: COMPLETED | OUTPATIENT
Start: 2019-01-01 | End: 2019-01-01

## 2019-01-01 RX ORDER — POTASSIUM CHLORIDE 20 MEQ/1
40 TABLET, EXTENDED RELEASE ORAL 2 TIMES DAILY
Status: COMPLETED | OUTPATIENT
Start: 2019-01-01 | End: 2019-01-01

## 2019-01-01 RX ORDER — SODIUM CHLORIDE 9 MG/ML
1000 INJECTION, SOLUTION INTRAVENOUS CONTINUOUS
Status: DISCONTINUED | OUTPATIENT
Start: 2019-01-01 | End: 2019-01-01

## 2019-01-01 RX ORDER — ALBUMIN HUMAN 250 G/1000ML
12.5 SOLUTION INTRAVENOUS EVERY 12 HOURS
Status: DISCONTINUED | OUTPATIENT
Start: 2019-01-01 | End: 2019-01-01

## 2019-01-01 RX ORDER — FUROSEMIDE 20 MG/1
20 TABLET ORAL DAILY
Status: DISCONTINUED | OUTPATIENT
Start: 2019-01-01 | End: 2019-01-01 | Stop reason: HOSPADM

## 2019-01-01 RX ORDER — GUAIFENESIN 100 MG/5ML
81 LIQUID (ML) ORAL DAILY
Status: DISCONTINUED | OUTPATIENT
Start: 2019-01-01 | End: 2019-01-01

## 2019-01-01 RX ORDER — SODIUM CHLORIDE 9 MG/ML
250 INJECTION, SOLUTION INTRAVENOUS AS NEEDED
Status: DISCONTINUED | OUTPATIENT
Start: 2019-01-01 | End: 2019-01-01 | Stop reason: HOSPADM

## 2019-01-01 RX ORDER — DEXTROSE 50 % IN WATER (D50W) INTRAVENOUS SYRINGE
25-50 AS NEEDED
Status: DISCONTINUED | OUTPATIENT
Start: 2019-01-01 | End: 2019-01-01 | Stop reason: HOSPADM

## 2019-01-01 RX ORDER — SODIUM CHLORIDE 9 MG/ML
250 INJECTION, SOLUTION INTRAVENOUS AS NEEDED
Status: DISCONTINUED | OUTPATIENT
Start: 2019-01-01 | End: 2019-01-01

## 2019-01-01 RX ORDER — LIDOCAINE HYDROCHLORIDE 20 MG/ML
INJECTION, SOLUTION EPIDURAL; INFILTRATION; INTRACAUDAL; PERINEURAL AS NEEDED
Status: DISCONTINUED | OUTPATIENT
Start: 2019-01-01 | End: 2019-01-01 | Stop reason: HOSPADM

## 2019-01-01 RX ORDER — DIPHENHYDRAMINE HCL 25 MG
25 CAPSULE ORAL ONCE
Status: COMPLETED | OUTPATIENT
Start: 2019-01-01 | End: 2019-01-01

## 2019-01-01 RX ORDER — ONDANSETRON 4 MG/1
4 TABLET, FILM COATED ORAL
Qty: 30 TAB | Refills: 0 | Status: SHIPPED | OUTPATIENT
Start: 2019-01-01 | End: 2019-01-01 | Stop reason: SDUPTHER

## 2019-01-01 RX ORDER — ACETAMINOPHEN 325 MG/1
650 TABLET ORAL
Status: DISCONTINUED | OUTPATIENT
Start: 2019-01-01 | End: 2019-01-01

## 2019-01-01 RX ORDER — KETAMINE HYDROCHLORIDE 100 MG/ML
INJECTION, SOLUTION INTRAMUSCULAR; INTRAVENOUS AS NEEDED
Status: DISCONTINUED | OUTPATIENT
Start: 2019-01-01 | End: 2019-01-01 | Stop reason: HOSPADM

## 2019-01-01 RX ORDER — MORPHINE SULFATE 20 MG/ML
5 SOLUTION ORAL
Qty: 1 BOTTLE | Refills: 0 | Status: SHIPPED | OUTPATIENT
Start: 2019-01-01 | End: 2019-01-01 | Stop reason: SDUPTHER

## 2019-01-01 RX ORDER — SODIUM CHLORIDE 9 MG/ML
1 INJECTION, SOLUTION INTRAVENOUS AS NEEDED
Status: DISPENSED | OUTPATIENT
Start: 2019-01-01 | End: 2019-01-01

## 2019-01-01 RX ORDER — SODIUM CHLORIDE 0.9 % (FLUSH) 0.9 %
10 SYRINGE (ML) INJECTION
Status: ACTIVE | OUTPATIENT
Start: 2019-01-01 | End: 2019-01-01

## 2019-01-01 RX ORDER — POTASSIUM CHLORIDE 20 MEQ/1
20 TABLET, EXTENDED RELEASE ORAL 2 TIMES DAILY
Status: DISCONTINUED | OUTPATIENT
Start: 2019-01-01 | End: 2019-01-01

## 2019-01-01 RX ORDER — ONDANSETRON 2 MG/ML
4 INJECTION INTRAMUSCULAR; INTRAVENOUS
Status: DISCONTINUED | OUTPATIENT
Start: 2019-01-01 | End: 2019-01-01

## 2019-01-01 RX ORDER — FUROSEMIDE 40 MG/1
40 TABLET ORAL DAILY
Status: DISCONTINUED | OUTPATIENT
Start: 2019-01-01 | End: 2019-01-01 | Stop reason: HOSPADM

## 2019-01-01 RX ORDER — POTASSIUM CHLORIDE 20 MEQ/1
40 TABLET, EXTENDED RELEASE ORAL EVERY 4 HOURS
Status: COMPLETED | OUTPATIENT
Start: 2019-01-01 | End: 2019-01-01

## 2019-01-01 RX ORDER — LANOLIN ALCOHOL/MO/W.PET/CERES
400 CREAM (GRAM) TOPICAL 2 TIMES DAILY
Status: DISCONTINUED | OUTPATIENT
Start: 2019-01-01 | End: 2019-01-01

## 2019-01-01 RX ORDER — SODIUM CHLORIDE 0.9 % (FLUSH) 0.9 %
5-10 SYRINGE (ML) INJECTION AS NEEDED
Status: DISCONTINUED | OUTPATIENT
Start: 2019-01-01 | End: 2019-01-01

## 2019-01-01 RX ORDER — DIGOXIN 125 MCG
0.12 TABLET ORAL SEE ADMIN INSTRUCTIONS
COMMUNITY
Start: 2019-01-01 | End: 2019-01-01 | Stop reason: ALTCHOICE

## 2019-01-01 RX ADMIN — Medication 400 MG: at 09:23

## 2019-01-01 RX ADMIN — ASPIRIN 81 MG 81 MG: 81 TABLET ORAL at 09:01

## 2019-01-01 RX ADMIN — POTASSIUM CHLORIDE 20 MEQ: 20 TABLET, EXTENDED RELEASE ORAL at 17:05

## 2019-01-01 RX ADMIN — Medication 400 MG: at 16:48

## 2019-01-01 RX ADMIN — Medication 10 ML: at 08:57

## 2019-01-01 RX ADMIN — PIPERACILLIN SODIUM,TAZOBACTAM SODIUM 4.5 G: 4; .5 INJECTION, POWDER, FOR SOLUTION INTRAVENOUS at 16:49

## 2019-01-01 RX ADMIN — Medication 400 MG: at 16:08

## 2019-01-01 RX ADMIN — PANTOPRAZOLE SODIUM 40 MG: 40 TABLET, DELAYED RELEASE ORAL at 09:13

## 2019-01-01 RX ADMIN — PIPERACILLIN SODIUM,TAZOBACTAM SODIUM 4.5 G: 4; .5 INJECTION, POWDER, FOR SOLUTION INTRAVENOUS at 21:38

## 2019-01-01 RX ADMIN — PIPERACILLIN SODIUM,TAZOBACTAM SODIUM 4.5 G: 4; .5 INJECTION, POWDER, FOR SOLUTION INTRAVENOUS at 18:17

## 2019-01-01 RX ADMIN — SIMVASTATIN 10 MG: 10 TABLET, FILM COATED ORAL at 22:08

## 2019-01-01 RX ADMIN — FUROSEMIDE 20 MG: 20 TABLET ORAL at 09:12

## 2019-01-01 RX ADMIN — PROPOFOL 50 MCG/KG/MIN: 10 INJECTION, EMULSION INTRAVENOUS at 12:45

## 2019-01-01 RX ADMIN — ASPIRIN 81 MG 81 MG: 81 TABLET ORAL at 09:28

## 2019-01-01 RX ADMIN — KETAMINE HYDROCHLORIDE 10 MG: 100 INJECTION, SOLUTION INTRAMUSCULAR; INTRAVENOUS at 12:50

## 2019-01-01 RX ADMIN — ACETAMINOPHEN 650 MG: 325 TABLET, FILM COATED ORAL at 08:33

## 2019-01-01 RX ADMIN — Medication 400 MG: at 16:57

## 2019-01-01 RX ADMIN — PANTOPRAZOLE SODIUM 40 MG: 40 TABLET, DELAYED RELEASE ORAL at 16:57

## 2019-01-01 RX ADMIN — DIGOXIN 0.12 MG: 125 TABLET ORAL at 09:25

## 2019-01-01 RX ADMIN — DIGOXIN 0.12 MG: 125 TABLET ORAL at 08:27

## 2019-01-01 RX ADMIN — SODIUM CHLORIDE 25 ML/HR: 900 INJECTION, SOLUTION INTRAVENOUS at 22:56

## 2019-01-01 RX ADMIN — Medication 5 ML: at 06:33

## 2019-01-01 RX ADMIN — ASPIRIN 81 MG 81 MG: 81 TABLET ORAL at 08:27

## 2019-01-01 RX ADMIN — ONDANSETRON 4 MG: 2 INJECTION INTRAMUSCULAR; INTRAVENOUS at 00:56

## 2019-01-01 RX ADMIN — PANTOPRAZOLE SODIUM 40 MG: 40 TABLET, DELAYED RELEASE ORAL at 07:00

## 2019-01-01 RX ADMIN — Medication 10 ML: at 06:27

## 2019-01-01 RX ADMIN — ASPIRIN 81 MG 81 MG: 81 TABLET ORAL at 09:26

## 2019-01-01 RX ADMIN — VANCOMYCIN HYDROCHLORIDE 1250 MG: 10 INJECTION, POWDER, LYOPHILIZED, FOR SOLUTION INTRAVENOUS at 19:06

## 2019-01-01 RX ADMIN — PIPERACILLIN SODIUM,TAZOBACTAM SODIUM 4.5 G: 4; .5 INJECTION, POWDER, FOR SOLUTION INTRAVENOUS at 17:05

## 2019-01-01 RX ADMIN — DILTIAZEM HYDROCHLORIDE 120 MG: 120 CAPSULE, COATED, EXTENDED RELEASE ORAL at 08:22

## 2019-01-01 RX ADMIN — PANTOPRAZOLE SODIUM 40 MG: 40 TABLET, DELAYED RELEASE ORAL at 18:04

## 2019-01-01 RX ADMIN — DILTIAZEM HYDROCHLORIDE 120 MG: 120 CAPSULE, COATED, EXTENDED RELEASE ORAL at 08:27

## 2019-01-01 RX ADMIN — FUROSEMIDE 20 MG: 20 TABLET ORAL at 08:22

## 2019-01-01 RX ADMIN — Medication 400 MG: at 09:32

## 2019-01-01 RX ADMIN — PROPOFOL 50 MG: 10 INJECTION, EMULSION INTRAVENOUS at 13:05

## 2019-01-01 RX ADMIN — PIPERACILLIN SODIUM,TAZOBACTAM SODIUM 4.5 G: 4; .5 INJECTION, POWDER, FOR SOLUTION INTRAVENOUS at 04:51

## 2019-01-01 RX ADMIN — PIPERACILLIN SODIUM,TAZOBACTAM SODIUM 4.5 G: 4; .5 INJECTION, POWDER, FOR SOLUTION INTRAVENOUS at 04:19

## 2019-01-01 RX ADMIN — SODIUM CHLORIDE 75 ML/HR: 900 INJECTION, SOLUTION INTRAVENOUS at 22:32

## 2019-01-01 RX ADMIN — SODIUM CHLORIDE 75 ML/HR: 900 INJECTION, SOLUTION INTRAVENOUS at 16:39

## 2019-01-01 RX ADMIN — Medication 400 MG: at 08:22

## 2019-01-01 RX ADMIN — PIPERACILLIN SODIUM,TAZOBACTAM SODIUM 4.5 G: 4; .5 INJECTION, POWDER, FOR SOLUTION INTRAVENOUS at 05:46

## 2019-01-01 RX ADMIN — Medication 400 MG: at 09:13

## 2019-01-01 RX ADMIN — PANTOPRAZOLE SODIUM 40 MG: 40 TABLET, DELAYED RELEASE ORAL at 17:29

## 2019-01-01 RX ADMIN — PERFLUTREN 1 ML: 6.52 INJECTION, SUSPENSION INTRAVENOUS at 14:00

## 2019-01-01 RX ADMIN — FUROSEMIDE 20 MG: 20 TABLET ORAL at 09:06

## 2019-01-01 RX ADMIN — PIPERACILLIN SODIUM,TAZOBACTAM SODIUM 4.5 G: 4; .5 INJECTION, POWDER, FOR SOLUTION INTRAVENOUS at 05:29

## 2019-01-01 RX ADMIN — CEFTRIAXONE 1 G: 1 INJECTION, POWDER, FOR SOLUTION INTRAMUSCULAR; INTRAVENOUS at 12:43

## 2019-01-01 RX ADMIN — Medication 400 MG: at 17:29

## 2019-01-01 RX ADMIN — BISACODYL 10 MG: 5 TABLET, COATED ORAL at 15:10

## 2019-01-01 RX ADMIN — PIPERACILLIN SODIUM,TAZOBACTAM SODIUM 4.5 G: 4; .5 INJECTION, POWDER, FOR SOLUTION INTRAVENOUS at 05:16

## 2019-01-01 RX ADMIN — PANTOPRAZOLE SODIUM 40 MG: 40 TABLET, DELAYED RELEASE ORAL at 08:21

## 2019-01-01 RX ADMIN — LIDOCAINE HYDROCHLORIDE 30 MG: 20 INJECTION, SOLUTION EPIDURAL; INFILTRATION; INTRACAUDAL; PERINEURAL at 13:06

## 2019-01-01 RX ADMIN — ALBUMIN (HUMAN) 12.5 G: 0.25 INJECTION, SOLUTION INTRAVENOUS at 12:10

## 2019-01-01 RX ADMIN — SODIUM CHLORIDE 250 ML: 900 INJECTION, SOLUTION INTRAVENOUS at 09:47

## 2019-01-01 RX ADMIN — DIGOXIN 0.12 MG: 125 TABLET ORAL at 13:52

## 2019-01-01 RX ADMIN — SODIUM CHLORIDE: 9 INJECTION, SOLUTION INTRAVENOUS at 12:40

## 2019-01-01 RX ADMIN — SODIUM CHLORIDE 50 ML/HR: 900 INJECTION, SOLUTION INTRAVENOUS at 14:23

## 2019-01-01 RX ADMIN — VANCOMYCIN HYDROCHLORIDE 1000 MG: 1 INJECTION, POWDER, LYOPHILIZED, FOR SOLUTION INTRAVENOUS at 00:21

## 2019-01-01 RX ADMIN — KETAMINE HYDROCHLORIDE 20 MG: 100 INJECTION, SOLUTION INTRAMUSCULAR; INTRAVENOUS at 12:42

## 2019-01-01 RX ADMIN — DIGOXIN 0.12 MG: 125 TABLET ORAL at 09:06

## 2019-01-01 RX ADMIN — ASPIRIN 81 MG 81 MG: 81 TABLET ORAL at 09:06

## 2019-01-01 RX ADMIN — PIPERACILLIN SODIUM,TAZOBACTAM SODIUM 4.5 G: 4; .5 INJECTION, POWDER, FOR SOLUTION INTRAVENOUS at 03:38

## 2019-01-01 RX ADMIN — PIPERACILLIN SODIUM,TAZOBACTAM SODIUM 4.5 G: 4; .5 INJECTION, POWDER, FOR SOLUTION INTRAVENOUS at 04:41

## 2019-01-01 RX ADMIN — Medication 400 MG: at 08:28

## 2019-01-01 RX ADMIN — ALBUMIN (HUMAN) 12.5 G: 0.25 INJECTION, SOLUTION INTRAVENOUS at 05:34

## 2019-01-01 RX ADMIN — DILTIAZEM HYDROCHLORIDE 120 MG: 120 CAPSULE, COATED, EXTENDED RELEASE ORAL at 09:53

## 2019-01-01 RX ADMIN — DILTIAZEM HYDROCHLORIDE 120 MG: 120 CAPSULE, COATED, EXTENDED RELEASE ORAL at 08:18

## 2019-01-01 RX ADMIN — ASPIRIN 81 MG 81 MG: 81 TABLET ORAL at 08:18

## 2019-01-01 RX ADMIN — ALBUMIN (HUMAN) 12.5 G: 0.25 INJECTION, SOLUTION INTRAVENOUS at 11:56

## 2019-01-01 RX ADMIN — PANTOPRAZOLE SODIUM 40 MG: 40 TABLET, DELAYED RELEASE ORAL at 08:28

## 2019-01-01 RX ADMIN — Medication 400 MG: at 09:53

## 2019-01-01 RX ADMIN — ONDANSETRON 4 MG: 2 INJECTION INTRAMUSCULAR; INTRAVENOUS at 11:42

## 2019-01-01 RX ADMIN — DILTIAZEM HYDROCHLORIDE 120 MG: 120 CAPSULE, COATED, EXTENDED RELEASE ORAL at 10:40

## 2019-01-01 RX ADMIN — PANTOPRAZOLE SODIUM 40 MG: 40 TABLET, DELAYED RELEASE ORAL at 09:53

## 2019-01-01 RX ADMIN — POTASSIUM CHLORIDE 20 MEQ: 20 TABLET, EXTENDED RELEASE ORAL at 09:01

## 2019-01-01 RX ADMIN — POTASSIUM CHLORIDE 40 MEQ: 20 TABLET, EXTENDED RELEASE ORAL at 08:20

## 2019-01-01 RX ADMIN — PANTOPRAZOLE SODIUM 40 MG: 40 TABLET, DELAYED RELEASE ORAL at 17:05

## 2019-01-01 RX ADMIN — Medication 400 MG: at 17:28

## 2019-01-01 RX ADMIN — Medication 400 MG: at 09:06

## 2019-01-01 RX ADMIN — DIGOXIN 0.12 MG: 125 TABLET ORAL at 08:22

## 2019-01-01 RX ADMIN — PANTOPRAZOLE SODIUM 40 MG: 40 TABLET, DELAYED RELEASE ORAL at 06:33

## 2019-01-01 RX ADMIN — VANCOMYCIN HYDROCHLORIDE 1000 MG: 1 INJECTION, POWDER, LYOPHILIZED, FOR SOLUTION INTRAVENOUS at 19:00

## 2019-01-01 RX ADMIN — PROPOFOL 100 MCG/KG/MIN: 10 INJECTION, EMULSION INTRAVENOUS at 13:05

## 2019-01-01 RX ADMIN — MAGNESIUM SULFATE IN WATER 4 G: 40 INJECTION, SOLUTION INTRAVENOUS at 09:32

## 2019-01-01 RX ADMIN — POTASSIUM CHLORIDE 40 MEQ: 20 TABLET, EXTENDED RELEASE ORAL at 11:18

## 2019-01-01 RX ADMIN — Medication 400 MG: at 17:14

## 2019-01-01 RX ADMIN — PIPERACILLIN SODIUM,TAZOBACTAM SODIUM 4.5 G: 4; .5 INJECTION, POWDER, FOR SOLUTION INTRAVENOUS at 21:29

## 2019-01-01 RX ADMIN — FUROSEMIDE 40 MG: 40 TABLET ORAL at 09:26

## 2019-01-01 RX ADMIN — DIATRIZOATE MEGLUMINE AND DIATRIZOATE SODIUM 15 ML: 660; 100 LIQUID ORAL; RECTAL at 08:58

## 2019-01-01 RX ADMIN — ACETAMINOPHEN 650 MG: 325 TABLET, FILM COATED ORAL at 09:29

## 2019-01-01 RX ADMIN — FUROSEMIDE 20 MG: 10 INJECTION, SOLUTION INTRAVENOUS at 22:08

## 2019-01-01 RX ADMIN — POTASSIUM CHLORIDE 20 MEQ: 20 TABLET, EXTENDED RELEASE ORAL at 11:57

## 2019-01-01 RX ADMIN — FUROSEMIDE 20 MG: 10 INJECTION, SOLUTION INTRAVENOUS at 12:12

## 2019-01-01 RX ADMIN — DIPHENHYDRAMINE HYDROCHLORIDE 25 MG: 25 CAPSULE ORAL at 09:28

## 2019-01-01 RX ADMIN — DIGOXIN 0.12 MG: 125 TABLET ORAL at 08:24

## 2019-01-01 RX ADMIN — SODIUM CHLORIDE 50 ML/HR: 900 INJECTION, SOLUTION INTRAVENOUS at 14:27

## 2019-01-01 RX ADMIN — ASPIRIN 81 MG 81 MG: 81 TABLET ORAL at 08:23

## 2019-01-01 RX ADMIN — ASPIRIN 81 MG 81 MG: 81 TABLET ORAL at 08:22

## 2019-01-01 RX ADMIN — PANTOPRAZOLE SODIUM 40 MG: 40 TABLET, DELAYED RELEASE ORAL at 08:23

## 2019-01-01 RX ADMIN — PIPERACILLIN SODIUM,TAZOBACTAM SODIUM 4.5 G: 4; .5 INJECTION, POWDER, FOR SOLUTION INTRAVENOUS at 05:01

## 2019-01-01 RX ADMIN — BARIUM SULFATE 15 ML: 0.6 SUSPENSION ORAL at 10:12

## 2019-01-01 RX ADMIN — PIPERACILLIN SODIUM,TAZOBACTAM SODIUM 4.5 G: 4; .5 INJECTION, POWDER, FOR SOLUTION INTRAVENOUS at 16:08

## 2019-01-01 RX ADMIN — DILTIAZEM HYDROCHLORIDE 120 MG: 120 CAPSULE, COATED, EXTENDED RELEASE ORAL at 08:24

## 2019-01-01 RX ADMIN — SODIUM CHLORIDE 100 ML: 900 INJECTION, SOLUTION INTRAVENOUS at 08:57

## 2019-01-01 RX ADMIN — ALBUMIN (HUMAN) 12.5 G: 0.25 INJECTION, SOLUTION INTRAVENOUS at 22:08

## 2019-01-01 RX ADMIN — DILTIAZEM HYDROCHLORIDE 120 MG: 120 CAPSULE, COATED, EXTENDED RELEASE ORAL at 09:24

## 2019-01-01 RX ADMIN — ALBUMIN (HUMAN) 12.5 G: 0.25 INJECTION, SOLUTION INTRAVENOUS at 17:22

## 2019-01-01 RX ADMIN — POTASSIUM CHLORIDE 40 MEQ: 20 TABLET, EXTENDED RELEASE ORAL at 17:14

## 2019-01-01 RX ADMIN — PANTOPRAZOLE SODIUM 40 MG: 40 TABLET, DELAYED RELEASE ORAL at 16:08

## 2019-01-01 RX ADMIN — FUROSEMIDE 20 MG: 10 INJECTION, SOLUTION INTRAVENOUS at 11:57

## 2019-01-01 RX ADMIN — DIGOXIN 0.12 MG: 125 TABLET ORAL at 09:52

## 2019-01-01 RX ADMIN — PANTOPRAZOLE SODIUM 40 MG: 40 TABLET, DELAYED RELEASE ORAL at 08:27

## 2019-01-01 RX ADMIN — PIPERACILLIN SODIUM,TAZOBACTAM SODIUM 4.5 G: 4; .5 INJECTION, POWDER, FOR SOLUTION INTRAVENOUS at 14:23

## 2019-01-01 RX ADMIN — PANTOPRAZOLE SODIUM 40 MG: 40 TABLET, DELAYED RELEASE ORAL at 17:14

## 2019-01-01 RX ADMIN — PANTOPRAZOLE SODIUM 40 MG: 40 TABLET, DELAYED RELEASE ORAL at 09:02

## 2019-01-01 RX ADMIN — SODIUM CHLORIDE 50 ML/HR: 900 INJECTION, SOLUTION INTRAVENOUS at 08:00

## 2019-01-01 RX ADMIN — SIMVASTATIN 10 MG: 10 TABLET, FILM COATED ORAL at 21:06

## 2019-01-01 RX ADMIN — SODIUM CHLORIDE, SODIUM LACTATE, POTASSIUM CHLORIDE, AND CALCIUM CHLORIDE 1000 ML: 600; 310; 30; 20 INJECTION, SOLUTION INTRAVENOUS at 12:30

## 2019-01-01 RX ADMIN — PANTOPRAZOLE SODIUM 40 MG: 40 TABLET, DELAYED RELEASE ORAL at 16:43

## 2019-01-01 RX ADMIN — ONDANSETRON 4 MG: 2 INJECTION INTRAMUSCULAR; INTRAVENOUS at 19:40

## 2019-01-01 RX ADMIN — OXYCODONE HYDROCHLORIDE 5 MG: 5 TABLET ORAL at 08:27

## 2019-01-01 RX ADMIN — SIMVASTATIN 10 MG: 10 TABLET, FILM COATED ORAL at 23:25

## 2019-01-01 RX ADMIN — PIPERACILLIN SODIUM,TAZOBACTAM SODIUM 4.5 G: 4; .5 INJECTION, POWDER, FOR SOLUTION INTRAVENOUS at 04:56

## 2019-01-01 RX ADMIN — Medication 238 G: at 15:11

## 2019-01-01 RX ADMIN — VANCOMYCIN HYDROCHLORIDE 1000 MG: 1 INJECTION, POWDER, LYOPHILIZED, FOR SOLUTION INTRAVENOUS at 06:59

## 2019-01-01 RX ADMIN — PANTOPRAZOLE SODIUM 40 MG: 40 TABLET, DELAYED RELEASE ORAL at 17:22

## 2019-01-01 RX ADMIN — PROPOFOL 20 MG: 10 INJECTION, EMULSION INTRAVENOUS at 12:40

## 2019-01-01 RX ADMIN — ASPIRIN 81 MG 81 MG: 81 TABLET ORAL at 08:28

## 2019-01-01 RX ADMIN — DIGOXIN 0.12 MG: 125 TABLET ORAL at 08:28

## 2019-01-01 RX ADMIN — AZITHROMYCIN MONOHYDRATE 500 MG: 500 INJECTION, POWDER, LYOPHILIZED, FOR SOLUTION INTRAVENOUS at 23:25

## 2019-01-01 RX ADMIN — DILTIAZEM HYDROCHLORIDE 120 MG: 120 CAPSULE, COATED, EXTENDED RELEASE ORAL at 09:02

## 2019-01-01 RX ADMIN — FUROSEMIDE 40 MG: 40 TABLET ORAL at 08:28

## 2019-01-01 RX ADMIN — PIPERACILLIN SODIUM,TAZOBACTAM SODIUM 4.5 G: 4; .5 INJECTION, POWDER, FOR SOLUTION INTRAVENOUS at 17:04

## 2019-01-01 RX ADMIN — PIPERACILLIN SODIUM,TAZOBACTAM SODIUM 4.5 G: 4; .5 INJECTION, POWDER, FOR SOLUTION INTRAVENOUS at 04:49

## 2019-01-01 RX ADMIN — PANTOPRAZOLE SODIUM 40 MG: 40 TABLET, DELAYED RELEASE ORAL at 09:21

## 2019-01-01 RX ADMIN — PIPERACILLIN SODIUM,TAZOBACTAM SODIUM 4.5 G: 4; .5 INJECTION, POWDER, FOR SOLUTION INTRAVENOUS at 17:54

## 2019-01-01 RX ADMIN — PIPERACILLIN SODIUM,TAZOBACTAM SODIUM 4.5 G: 4; .5 INJECTION, POWDER, FOR SOLUTION INTRAVENOUS at 04:42

## 2019-01-01 RX ADMIN — Medication 400 MG: at 17:58

## 2019-01-01 RX ADMIN — DILTIAZEM HYDROCHLORIDE 120 MG: 120 CAPSULE, COATED, EXTENDED RELEASE ORAL at 09:06

## 2019-01-01 RX ADMIN — PIPERACILLIN SODIUM,TAZOBACTAM SODIUM 4.5 G: 4; .5 INJECTION, POWDER, FOR SOLUTION INTRAVENOUS at 16:43

## 2019-01-01 RX ADMIN — OXYCODONE HYDROCHLORIDE 5 MG: 5 TABLET ORAL at 11:42

## 2019-01-01 RX ADMIN — FUROSEMIDE 40 MG: 40 TABLET ORAL at 12:03

## 2019-01-01 RX ADMIN — PANTOPRAZOLE SODIUM 40 MG: 40 TABLET, DELAYED RELEASE ORAL at 17:57

## 2019-01-01 RX ADMIN — PIPERACILLIN SODIUM,TAZOBACTAM SODIUM 4.5 G: 4; .5 INJECTION, POWDER, FOR SOLUTION INTRAVENOUS at 16:42

## 2019-01-01 RX ADMIN — POTASSIUM CHLORIDE 40 MEQ: 20 TABLET, EXTENDED RELEASE ORAL at 09:29

## 2019-01-01 RX ADMIN — IOPAMIDOL 100 ML: 755 INJECTION, SOLUTION INTRAVENOUS at 08:57

## 2019-01-01 RX ADMIN — SODIUM CHLORIDE 1000 ML/HR: 900 INJECTION, SOLUTION INTRAVENOUS at 15:17

## 2019-01-01 RX ADMIN — SODIUM CHLORIDE 75 ML/HR: 900 INJECTION, SOLUTION INTRAVENOUS at 00:17

## 2019-01-01 RX ADMIN — PANTOPRAZOLE SODIUM 40 MG: 40 TABLET, DELAYED RELEASE ORAL at 17:28

## 2019-01-01 RX ADMIN — PIPERACILLIN SODIUM,TAZOBACTAM SODIUM 4.5 G: 4; .5 INJECTION, POWDER, FOR SOLUTION INTRAVENOUS at 14:27

## 2019-01-01 RX ADMIN — ONDANSETRON 4 MG: 2 INJECTION INTRAMUSCULAR; INTRAVENOUS at 02:14

## 2019-01-01 RX ADMIN — ONDANSETRON 4 MG: 2 INJECTION INTRAMUSCULAR; INTRAVENOUS at 21:33

## 2019-01-01 RX ADMIN — PIPERACILLIN SODIUM,TAZOBACTAM SODIUM 4.5 G: 4; .5 INJECTION, POWDER, FOR SOLUTION INTRAVENOUS at 22:21

## 2019-01-01 RX ADMIN — ASPIRIN 81 MG 81 MG: 81 TABLET ORAL at 09:53

## 2019-01-01 RX ADMIN — PANTOPRAZOLE SODIUM 40 MG: 40 TABLET, DELAYED RELEASE ORAL at 07:49

## 2019-01-01 RX ADMIN — ASPIRIN 81 MG 81 MG: 81 TABLET ORAL at 09:13

## 2019-01-01 RX ADMIN — DILTIAZEM HYDROCHLORIDE 120 MG: 120 CAPSULE, COATED, EXTENDED RELEASE ORAL at 08:28

## 2019-01-01 RX ADMIN — POTASSIUM CHLORIDE 40 MEQ: 20 TABLET, EXTENDED RELEASE ORAL at 17:44

## 2019-01-01 RX ADMIN — PIPERACILLIN SODIUM,TAZOBACTAM SODIUM 4.5 G: 4; .5 INJECTION, POWDER, FOR SOLUTION INTRAVENOUS at 15:24

## 2019-01-01 RX ADMIN — DIGOXIN 0.12 MG: 125 TABLET ORAL at 09:02

## 2019-01-01 RX ADMIN — Medication 400 MG: at 18:05

## 2019-01-01 RX ADMIN — PANTOPRAZOLE SODIUM 40 MG: 40 TABLET, DELAYED RELEASE ORAL at 16:48

## 2019-01-01 RX ADMIN — DIGOXIN 0.12 MG: 125 TABLET ORAL at 09:13

## 2019-01-01 RX ADMIN — LIDOCAINE HYDROCHLORIDE 40 MG: 20 INJECTION, SOLUTION EPIDURAL; INFILTRATION; INTRACAUDAL; PERINEURAL at 12:40

## 2019-01-01 RX ADMIN — PIPERACILLIN SODIUM,TAZOBACTAM SODIUM 4.5 G: 4; .5 INJECTION, POWDER, FOR SOLUTION INTRAVENOUS at 18:04

## 2019-01-01 RX ADMIN — PANTOPRAZOLE SODIUM 40 MG: 40 TABLET, DELAYED RELEASE ORAL at 16:30

## 2019-01-01 RX ADMIN — ONDANSETRON 4 MG: 2 INJECTION INTRAMUSCULAR; INTRAVENOUS at 00:01

## 2019-01-01 RX ADMIN — DILTIAZEM HYDROCHLORIDE 120 MG: 120 CAPSULE, COATED, EXTENDED RELEASE ORAL at 09:01

## 2019-01-01 RX ADMIN — ONDANSETRON 4 MG: 2 INJECTION INTRAMUSCULAR; INTRAVENOUS at 11:57

## 2019-02-13 PROBLEM — A41.9 SEPSIS (HCC): Status: ACTIVE | Noted: 2019-01-01

## 2019-02-13 PROBLEM — R55 SYNCOPE AND COLLAPSE: Status: ACTIVE | Noted: 2019-01-01

## 2019-02-13 PROBLEM — J18.9 PNEUMONIA: Status: ACTIVE | Noted: 2019-01-01

## 2019-02-13 PROBLEM — R19.7 DIARRHEA: Status: ACTIVE | Noted: 2019-01-01

## 2019-02-13 PROBLEM — J90 PLEURAL EFFUSION: Status: ACTIVE | Noted: 2019-01-01

## 2019-02-13 PROBLEM — R60.9 EDEMA: Status: ACTIVE | Noted: 2019-01-01

## 2019-02-13 NOTE — PROGRESS NOTES
Patient referred to iMriam Stephens with [facility] to help patient become established with a PCP in [place] for continued [facility] orders and continuity of care. Patient in agreement with referral.  
 
ELLIOTT Long  [facility] [email or web address]

## 2019-02-13 NOTE — PROGRESS NOTES
SW familiar with patient from previous admission in April. Patient lives in [address] [place] [address], but visits her daughter Dasha Garcia ([phone]) frequently. Dasha Garcia states that she went to [facility] to get her mother and bring her to [place] due to difficulty ambulating and preforming ADLs for the last few months. The patient will be staying with her daughter for the foreseeable future at [address], [address] [place] [address]. Patient and daughter are receptive to [facility] services and would appreciate a referral to [facility] for PT/OT/RN and aide. SW will arrange for [facility] services and CM dept will continue to follow for any additional needs. Patient's PCP is Dr. Jada Ann at [facility] in [address] [place] [address], last appointment was a few months ago per the patient. Mark Shah, ELLIOTT  [email or web address]

## 2019-02-13 NOTE — H&P
Hospitalist H&P Note Admit Date:  2019 11:29 AM  
Name:  Marie Dawson Age:  80 y.o. 
:  1932 MRN:  [id number] PCP:  [facility], Not On File Treatment Team: Attending Provider: Reymundo Worrell MD; Primary Nurse: Dick Castillo, RN; Care Manager: Ed Cheng 
CC: passed out in [facility] today after using the bathroom HPI:  
80yr old female with pmhx sig for chf, htn, asthma. kidney cancer s/p resection, hx of a lung nodule - not known to be cancerous but s/p pneumonectomy. The patient states she has been unwell for months, with diarrhea for months. .. Last admitted to hospital in [address] [place] [address] in December after passing out at the [employer], she was given blood, scheduled for colonoscopy but poor prep so it was not done. She passed at her home in [address] [place] [address] on Saturday- daughter went to get her and brought her [place]. ... She wanted to go to the bank today. . But needed to use the bathroom on the way. . They stopped at [facility]-- the pt had a bm and passed out at the sink while washing her hands. .. Ems - called. .. In the er cxr concerning for pneumonia; lactic acid elevated. hospitalist asked to admit 10 systems reviewed and negative except as noted in HPI. Past Medical History:  
Diagnosis Date  Arthritis  Asthma  CAD (coronary artery disease)  Cancer ([facility] [address]  Chronic kidney disease   
 kidney removed  Heart failure ([facility] [address]  Hypertension  Psychiatric disorder   
 depresion  PUD (peptic ulcer disease)   
 acid reflux Past Surgical History:  
Procedure Laterality Date  HX HEENT    
 eye surgery  HX OTHER SURGICAL    
 lung  HX UROLOGICAL No Known Allergies Social History Tobacco Use  Smoking status: Former Smoker  Smokeless tobacco: Never Used Substance Use Topics  Alcohol use: No  
  
History reviewed. No pertinent family history. Immunization History Administered Date(s) Administered  TB Skin Test (PPD) Intradermal 04/27/2018 PTA Medications: 
Prior to Admission Medications Prescriptions Last Dose Informant Patient Reported? Taking?  
aspirin 81 mg chewable tablet   Yes Yes Sig: Take 81 mg by mouth daily. calcium carbonate (TUMS) 200 mg calcium (500 mg) chew   Yes Yes Sig: Take 1 Tab by mouth every six (6) hours as needed. Indications: Heartburn  
dilTIAZem (CARDIZEM) 120 mg tablet   Yes Yes Sig: Take 120 mg by mouth daily. ferrous sulfate 325 mg (65 mg iron) tablet   No Yes Sig: Take 1 Tab by mouth daily (with breakfast). furosemide (LASIX) 40 mg tablet   Yes Yes Sig: Take  by mouth daily. lisinopril (PRINIVIL, ZESTRIL) 5 mg tablet   Yes Yes Sig: Take 5 mg by mouth daily. loperamide (IMMODIUM) 2 mg tablet   Yes Yes Sig: Take 2 mg by mouth four (4) times daily as needed for Diarrhea. Indications: Diarrhea  
omeprazole (PRILOSEC) 40 mg capsule   Yes Yes Sig: Take 40 mg by mouth daily. simvastatin (ZOCOR) 20 mg tablet   Yes Yes Sig: Take 20 mg by mouth nightly. Facility-Administered Medications: None Objective:  
 
Patient Vitals for the past 24 hrs: 
 Temp Pulse Resp BP SpO2  
02/13/19 1340     95 % 02/13/19 1039 98.8 °F (37.1 °C) (!) 104 20 104/49 95 % Oxygen Therapy O2 Sat (%): 95 % (02/13/19 1340) O2 Device: Room air (02/13/19 1340) No intake or output data in the 24 hours ending 02/13/19 1543 Physical Exam: 
General:    Well nourished. Alert. Loss of vision left eye Eyes:   Normal sclera. Extraocular movements intact. ENT:  Normocephalic, atraumatic. Moist mucous membranes CV:   RRR. No m/r/g. Peripheral pulses present. Capillary refill normal 
Lungs:  CTAB. No wheezing, rhonchi, or rales. Abdomen: Soft, nontender, nondistended. Bowel sounds normal.  
Extremities: Warm and dry. No cyanosis; bilateral pitting edema. Sacral edema Neurologic: CN II-XII grossly intact. Sensation intact. Skin:     No rashes or jaundice. Normal coloration Psych:  Normal mood and affect. I reviewed the labs, imaging, EKGs, telemetry, and other studies done this admission. Data Review:  
Recent Results (from the past 24 hour(s)) EKG, 12 LEAD, INITIAL Collection Time: 02/13/19 10:46 AM  
Result Value Ref Range Ventricular Rate 102 BPM  
 Atrial Rate 74 BPM  
 QRS Duration 90 ms Q-T Interval 298 ms QTC Calculation (Bezet) 388 ms Calculated R Axis 37 degrees Calculated T Axis -124 degrees Diagnosis    
  atrial fibrillation intermittent LBBB pattern Anterior infarct , age undetermined ST & T wave abnormality, consider inferolateral ischemia Abnormal ECG Confirmed by MILLY SIMS (), Maru Davenport ([id number]) on 2/13/2019 3:02:81 PM 
  
METABOLIC PANEL, COMPREHENSIVE Collection Time: 02/13/19 11:20 AM  
Result Value Ref Range Sodium 142 136 - 145 mmol/L Potassium 4.6 3.5 - 5.1 mmol/L Chloride 108 (H) 98 - 107 mmol/L  
 CO2 18 (L) 21 - 32 mmol/L Anion gap 16 mmol/L Glucose 80 65 - 100 mg/dL BUN 29 (H) 8 - 23 MG/DL Creatinine 1.64 (H) 0.6 - 1.0 MG/DL  
 GFR est AA 38 (L) >60 ml/min/1.73m2 GFR est non-AA 32 ml/min/1.73m2 Calcium 7.4 (L) 8.3 - 10.4 MG/DL Bilirubin, total 0.4 0.2 - 1.1 MG/DL  
 ALT (SGPT) 25 12 - 65 U/L  
 AST (SGOT) 64 (H) 15 - 37 U/L Alk. phosphatase 199 (H) 50 - 130 U/L Protein, total 6.1 g/dL Albumin 1.3 (L) 3.2 - 4.6 g/dL Globulin 4.8 (H) 2.3 - 3.5 g/dL A-G Ratio 0.3 LIPASE Collection Time: 02/13/19 11:20 AM  
Result Value Ref Range Lipase 39 (L) 73 - 393 U/L MAGNESIUM Collection Time: 02/13/19 11:20 AM  
Result Value Ref Range Magnesium 1.8 1.8 - 2.4 mg/dL PROCALCITONIN Collection Time: 02/13/19 11:20 AM  
Result Value Ref Range Procalcitonin 0.1 ng/mL CBC WITH AUTOMATED DIFF Collection Time: 02/13/19 11:47 AM  
Result Value Ref Range WBC 16.5 (H) 4.3 - 11.1 K/uL  
 RBC 3.63 (L) 4.05 - 5.2 M/uL HGB 9.8 (L) 11.7 - 15.4 g/dL HCT 32.1 (L) 35.8 - 46.3 % MCV 88.4 79.6 - 97.8 FL  
 MCH 27.0 26.1 - 32.9 PG  
 MCHC 30.5 (L) 31.4 - 35.0 g/dL RDW 21.4 (H) 11.9 - 14.6 % PLATELET 351 071 - 083 K/uL MPV 10.4 9.4 - 12.3 FL ABSOLUTE NRBC 0.00 0.0 - 0.2 K/uL  
 DF AUTOMATED NEUTROPHILS 87 (H) 43 - 78 % LYMPHOCYTES 9 (L) 13 - 44 % MONOCYTES 3 (L) 4.0 - 12.0 % EOSINOPHILS 0 (L) 0.5 - 7.8 % BASOPHILS 0 0.0 - 2.0 % IMMATURE GRANULOCYTES 1 0.0 - 5.0 %  
 ABS. NEUTROPHILS 14.3 (H) 1.7 - 8.2 K/UL  
 ABS. LYMPHOCYTES 1.5 0.5 - 4.6 K/UL  
 ABS. MONOCYTES 0.5 0.1 - 1.3 K/UL  
 ABS. EOSINOPHILS 0.1 0.0 - 0.8 K/UL  
 ABS. BASOPHILS 0.0 0.0 - 0.2 K/UL  
 ABS. IMM. GRANS. 0.1 0.0 - 0.5 K/UL  
POC TROPONIN-I Collection Time: 02/13/19 12:09 PM  
Result Value Ref Range Troponin-I (POC) 0.09 (H) 0.02 - 0.05 ng/ml POC LACTIC ACID Collection Time: 02/13/19 12:37 PM  
Result Value Ref Range Lactic Acid (POC) 2.68 (H) 0.5 - 1.9 mmol/L All Micro Results None Other Studies: Xr Chest Pa Lat Result Date: 2/13/2019 Chest 2 view CLINICAL INDICATION: Acute syncope and weakness, vomiting, one week of upper abdominal pain and diarrhea. History of coronary artery disease, hypertension, heart failure, arthritis, asthma, lung nodules and scarring, chronic kidney disease, former smoker, right lung lobectomy COMPARISON: 4/27/2018 radiograph TECHNIQUE: Upright AP and lateral views of the chest FINDINGS: Again demonstrated is complete opacification of the right hemithorax with perihilar surgical clips, and deviation of the trachea and mediastinum to the right. This is suggestive of previous right pneumonectomy and not definitely changed since prior. Mediastinal and hilar contours are mostly obscured. There is increased mild groundglass opacity in the left base, as well as a small left pleural effusion.  Mild chronic linear opacities projecting over right upper lung are unchanged and most compatible with scarring. No evidence of a pneumothorax. Diffusely low bone density. IMPRESSION: 1. Left basilar infiltrate and small left pleural effusion. 2. Chronic right pneumonectomy change again noted. Assessment and Plan:  
 
Hospital Problems as of 2/13/2019 Never Reviewed Codes Class Noted - Resolved POA Pneumonia ICD-10-CM: J18.9 ICD-9-CM: 842  2/13/2019 - Present Unknown Sepsis ([facility] [address] ICD-10-CM: A41.9 ICD-9-CM: 038.9, 995.91  2/13/2019 - Present Unknown Syncope and collapse ICD-10-CM: R55 
ICD-9-CM: 780.2  2/13/2019 - Present Yes Diarrhea ICD-10-CM: R19.7 ICD-9-CM: 787.91  2/13/2019 - Present Yes Edema ICD-10-CM: R60.9 ICD-9-CM: 782.3  2/13/2019 - Present Yes Pleural effusion ICD-10-CM: J90 ICD-9-CM: 511.9  2/13/2019 - Present Yes Atrial fibrillation with rapid ventricular response (HCC) ICD-10-CM: I48.91 
ICD-9-CM: 427.31  4/27/2018 - Present Yes Systolic CHF, acute on chronic (HCC) (Chronic) ICD-10-CM: L72.43 ICD-9-CM: 428.23, 428.0  4/27/2018 - Present Yes Solitary right kidney (Chronic) ICD-10-CM: Q60.0 ICD-9-CM: 753.0  4/27/2018 - Present Yes S/P lobectomy of lung (Chronic) ICD-10-CM: Z90.2 ICD-9-CM: V45.89  4/27/2018 - Present Yes PLAN: 
· Syncope- remote tele, trops, echo, request old records from [facility] system · Pneumonia- will need to broaden abx given recent hospitalization; f/u p cultures · Sepsis- treat per sepsis protocol- bolus held given 3rd spacing on clinical exam and hx of chf - will give fluids gently · Effusion- iv lasix when hemo dynamically stable- if no improvement - may need pulm to tap · Edema- concerning for chf vs volume overload- bnp and trop pending · Diarrhea- send cdiff- gi consult - may need to arrange inpatient scope · Concern for uti- pt with symptoms - will send ua and culture · Hx of anemia requiring transfusions. .. monitor H &H, occult blood, gi as needed. Matilde Knight · Further workup and mgt based on her clinical course · Ppd 
· Pt 
· ot · Request old records from [facility] · Pt has not walked in about 1 week- she walks with a rollator DVT ppx:  scds Anticipated DC needs:  outpt pcp, Code status:  Full Estimated LOS:  Greater than 2 midnights Risk:  high Signed: 
Asmita Nino MD

## 2019-02-13 NOTE — ED PROVIDER NOTES
80-year-old lady presents with concerns about nearly passing out as she was trying to get to the restroom today. She said that she has had multiple episodes of diarrhea but that has been steady for the last few months. Patient says that the weakness and fatigue just started within the past few days. She does note cultures to eat she feels everything runs through her. She has a history of having her right lung removed twenty some odd years ago and she is also had a kidney removed. Elements of this note were created using speech recognition software. As such, errors of speech recognition may be present. Past Medical History:  
Diagnosis Date  Arthritis  Asthma  CAD (coronary artery disease)  Cancer ([facility] [address]  Chronic kidney disease   
 kidney removed  Heart failure ([facility] [address]  Hypertension  Psychiatric disorder   
 depresion  PUD (peptic ulcer disease)   
 acid reflux Past Surgical History:  
Procedure Laterality Date  HX HEENT    
 eye surgery  HX OTHER SURGICAL    
 lung  HX UROLOGICAL History reviewed. No pertinent family history. Social History Socioeconomic History  Marital status: SINGLE Spouse name: Not on file  Number of children: Not on file  Years of education: Not on file  Highest education level: Not on file Social Needs  Financial resource strain: Not on file  Food insecurity - worry: Not on file  Food insecurity - inability: Not on file  Transportation needs - medical: Not on file  Transportation needs - non-medical: Not on file Occupational History  Not on file Tobacco Use  Smoking status: Former Smoker  Smokeless tobacco: Never Used Substance and Sexual Activity  Alcohol use: No  
 Drug use: Not on file  Sexual activity: Not on file Other Topics Concern  Not on file Social History Narrative  Not on file ALLERGIES: Patient has no known allergies. Review of Systems Constitutional: Positive for appetite change and fatigue. Negative for chills, diaphoresis and fever. HENT: Negative for congestion, rhinorrhea and sore throat. Eyes: Negative for redness and visual disturbance. Respiratory: Negative for cough, chest tightness, shortness of breath and wheezing. Cardiovascular: Negative for chest pain and palpitations. Gastrointestinal: Negative for abdominal pain, blood in stool, diarrhea, nausea and vomiting. Endocrine: Negative for polydipsia and polyuria. Genitourinary: Negative for dysuria and hematuria. Musculoskeletal: Negative for arthralgias, myalgias and neck stiffness. Skin: Negative for rash. Allergic/Immunologic: Negative for environmental allergies and food allergies. Neurological: Negative for dizziness, weakness and headaches. Hematological: Negative for adenopathy. Does not bruise/bleed easily. Psychiatric/Behavioral: Negative for confusion and sleep disturbance. The patient is not nervous/anxious. Vitals:  
 02/13/19 1039 BP: 104/49 Pulse: (!) 104 Resp: 20 Temp: 98.8 °F (37.1 °C) SpO2: 95% Weight: 54.4 kg (120 lb) Height: 5' 5.5\" (1.664 m) Physical Exam  
Constitutional: She is oriented to person, place, and time. She appears well-developed and well-nourished. HENT:  
Head: Normocephalic and atraumatic. Eyes: EOM are normal.  
Left eye clotted over with significant cataract Cardiovascular:  
tachycardia Pulmonary/Chest: No respiratory distress. Bilateral congestion Abdominal: Soft. Bowel sounds are normal. There is no tenderness. Neurological: She is alert and oriented to person, place, and time. Skin: Skin is warm and dry. Nursing note and vitals reviewed. MDM Number of Diagnoses or Management Options Elevated troponin:  
Infiltrate of left lung present on chest x-ray:  
Diagnosis management comments: I will treat with antibiotics given her elevated white count and her chest x-ray findings. 12:51 PM 
I spoke with the hospitalist who kindly agreed to see the patient. Procedures

## 2019-02-13 NOTE — PROGRESS NOTES
Patient scheduled for Monday, 2/18 at 11:15am, arrival time of 10:45am. She is going to be seen by Dr. Nadia Patricio at [address] [facility]. Patient and her daughter are aware of upcoming appointment, provided with print out. Anjana Bearden, BSW  [address] [place] [address] [email or web address]

## 2019-02-13 NOTE — ED TRIAGE NOTES
Pt in with family c/o syncopal episode while having bowel movement at [facility]. States she has had vomiting abdominal pain and diarrhea for pas week.

## 2019-02-13 NOTE — PROGRESS NOTES
[facility] [place] [facility] to Face Encounter Patients Name: Aislinn Lim    YOB: 1932 Ordering Physician:Leandro Primary Diagnosis: Pneumonia [J18.9] [facility] [A41.9] Date of Face to Face:   2/13/2019 Face to Face Encounter findings are related to primary reason for home care:   yes. 1. I certify that the patient needs intermittent care as follows: skilled nursing care:  skilled observation/assessment, patient education 
physical therapy: strengthening, stretching/ROM, transfer training, gait/stair training, balance training and pt/caregiver education 
occupational therapy:  ADL safety (ie. cooking, bathing, dressing), ROM and pt/caregiver education 2. I certify that this patient is homebound, that is: 1) patient requires the use of a walker device, special transportation, or assistance of another to leave the home; or 2) patient's condition makes leaving the home medically contraindicated; and 3) patient has a normal inability to leave the home and leaving the home requires considerable and taxing effort. Patient may leave the home for infrequent and short duration for medical reasons, and occasional absences for non-medical reasons. Homebound status is due to the following functional limitations: Patient with strength deficits limiting the performance of all ADL's without caregiver assistance or the use of an assistive device. Patient with poor safety awareness and is at risk for falls without assistance of another person and the use of an assistive device. Patient with poor ambulation endurance limiting their safe ability to ascend/descend the required number of steps to leave the home.  
 
3. I certify that this patient is under my care and that I, or a nurse practitioner or  [id number], or clinical nurse specialist, or certified nurse midwife, working with me, had a Face-to-Face Encounter that meets the physician Face-to-Face Encounter requirements. The following are the clinical findings from the Face-to-Face encounter that support the need for skilled services and is a summary of the encounter:  
 
See Hospital Chart Cece Rosalinda 2/13/2019 THE FOLLOWING TO BE COMPLETED BY THE COMMUNITY PHYSICIAN: 
 
I concur with the findings described above from the F2F encounter that this patient is homebound and in need of a skilled service. Certifying Physician: _____________________________________ Printed Certifying Physician Name: _____________________________________ Date: _________________

## 2019-02-14 NOTE — H&P (VIEW-ONLY)
[facility] Consult Note Primary GI Physician: Dr. Damian Mulligan Referring Provider:  Dr. Claudy Hollins Consult Date:  2/14/2019 Admit Date:  2/13/2019 Chief Complaint: diarrhea Subjective:  
 
History of Present Illness:  Patient is a 80 y.o. female with PMH of CHF, HTN, asthma. kidney cancer s/p resection 2016, hx of a lung nodule - not known to be cancerous but s/p pneumonectomy, who is seen in consultation at the request of Dr. Claudy Hollins for diarrhea. The patient lives in [facility] but has not been doing well for several months. Her son/ daughter decided to bring her to the hospital here in [place] where they live but state that they to stop along the way for her to have  BM and patient fainted at the sink while washing her hands. EMS was called. Patient states that she has been having diarrhea 5-7 times a day for 3 months which she describes as brown water. She has some intermittent epigastric pain but no N/V. She has had difficulty swallowing for years since her pneumonectomy as this has caused a mediastinal shift. According to family, Ms. Zachary Horne has had recurrent anemia of unclear etiology. She was apparently transfused in April 2018 and again received 2 units in December 2018 (in [facility]). She was scheduled for a colonoscopy in December for evaluation of anemia but was unable to tolerate the prep. EGD was not recommended because of her anatomy. Patient denies any history of hematochezia or melena. In the ER, she is found to have CXR concerning for pnuemonia and is on Zosyn and Vanco. Lactic acid level is elevated and pt has lower extremity edema concerning for volume overload. She has been started on IV Lasix. Patient has BANDAR and normocytic anemia with drop from 9.8 on arrival to 7.2 now. She is receiving a blood transfusion now. BANDAR improving with hydration.  She is on remote tele for syncope evaluation and is to have echo. Blood cultures, urine cultures, and Cdiff are pending.   
 
 
 
 
 
 
 
PMH: 
Past Medical History:  
Diagnosis Date  Arthritis  Asthma  CAD (coronary artery disease)  [facility] ([facility]  Chronic kidney disease   
 kidney removed  Heart failure ([facility]  Hypertension  Psychiatric disorder   
 depresion  PUD (peptic ulcer disease)   
 acid reflux PSH: 
Past Surgical History:  
Procedure Laterality Date  HX HEENT    
 eye surgery  HX OTHER SURGICAL    
 lung  HX UROLOGICAL Allergies: 
No Known Allergies Home Medications: 
Prior to Admission medications Medication Sig Start Date End Date Taking? Authorizing Provider  
ferrous sulfate 325 mg (65 mg iron) tablet Take 1 Tab by mouth daily (with breakfast). 4/28/18  Yes Tori Dumas MD  
aspirin 81 mg chewable tablet Take 81 mg by mouth daily. Yes Provider, Historical  
omeprazole (PRILOSEC) 40 mg capsule Take 40 mg by mouth daily. Yes Other, MD Lorena  
furosemide (LASIX) 40 mg tablet Take  by mouth daily. Yes Other, MD Lorena  
simvastatin (ZOCOR) 20 mg tablet Take 20 mg by mouth nightly. Yes Other, MD Lorena  
dilTIAZem (CARDIZEM) 120 mg tablet Take 120 mg by mouth daily. Yes Other, MD Lorena  
lisinopril (PRINIVIL, ZESTRIL) 5 mg tablet Take 5 mg by mouth daily. Yes Other, MD Lorena  
loperamide (IMMODIUM) 2 mg tablet Take 2 mg by mouth four (4) times daily as needed for Diarrhea. Indications: Diarrhea   Yes Provider, Historical  
calcium carbonate (TUMS) 200 mg calcium (500 mg) chew Take 1 Tab by mouth every six (6) hours as needed. Indications: Heartburn   Yes Provider, Historical  
 
 
Hospital Medications: 
Current Facility-Administered Medications Medication Dose Route Frequency  0.9% sodium chloride infusion 250 mL  250 mL IntraVENous PRN  potassium chloride (K-DUR, KLOR-CON) SR tablet 40 mEq  40 mEq Oral BID  
  dilTIAZem CD (CARDIZEM CD) capsule 120 mg  120 mg Oral DAILY  sodium chloride (NS) flush 5-10 mL  5-10 mL IntraVENous PRN  
 0.9% sodium chloride infusion  75 mL/hr IntraVENous CONTINUOUS  
 aspirin chewable tablet 81 mg  81 mg Oral DAILY  pantoprazole (PROTONIX) tablet 40 mg  40 mg Oral ACB  simvastatin (ZOCOR) tablet 10 mg  10 mg Oral QHS  piperacillin-tazobactam (ZOSYN) 4.5 g in 0.9% sodium chloride (MBP/ADV) 100 mL  4.5 g IntraVENous Q12H  
 [START ON 2/15/2019] vancomycin (VANCOCIN) 1,000 mg in 0.9% sodium chloride (MBP/ADV) 250 mL  1,000 mg IntraVENous Q36H  
 ondansetron (ZOFRAN) injection 4 mg  4 mg IntraVENous Q4H PRN Current Outpatient Medications Medication Sig  
 ferrous sulfate 325 mg (65 mg iron) tablet Take 1 Tab by mouth daily (with breakfast).  aspirin 81 mg chewable tablet Take 81 mg by mouth daily.  omeprazole (PRILOSEC) 40 mg capsule Take 40 mg by mouth daily.  furosemide (LASIX) 40 mg tablet Take  by mouth daily.  simvastatin (ZOCOR) 20 mg tablet Take 20 mg by mouth nightly.  dilTIAZem (CARDIZEM) 120 mg tablet Take 120 mg by mouth daily.  lisinopril (PRINIVIL, ZESTRIL) 5 mg tablet Take 5 mg by mouth daily.  loperamide (IMMODIUM) 2 mg tablet Take 2 mg by mouth four (4) times daily as needed for Diarrhea. Indications: Diarrhea  calcium carbonate (TUMS) 200 mg calcium (500 mg) chew Take 1 Tab by mouth every six (6) hours as needed. Indications: Heartburn Social History: 
Social History Tobacco Use  Smoking status: Former Smoker  Smokeless tobacco: Never Used Substance Use Topics  Alcohol use: No  
 
 
Pt denies any history of drug use, blood transfusions, or tattoos. Family History: 
History reviewed. No pertinent family history. Review of Systems: A detailed 10 system ROS is obtained, with pertinent positives as listed above. All others are negative. Diet: not ordered. Objective:  
 
Physical Exam: 
Vitals: Visit Vitals /64 Pulse 93 Temp 97.2 °F (36.2 °C) Resp 16 Ht 5' 5.5\" (1.664 m) Wt 54.4 kg (120 lb) SpO2 100% BMI 19.67 kg/m² Gen:  Pt is alert, cooperative, no acute distress Skin:  Extremities and face reveal no rashes. HEENT: Sclerae anicteric. Extra-occular muscles are intact. No oral ulcers. No abnormal pigmentation of the lips. The neck is supple. Cardiovascular: Regular rate and rhythm. No murmurs, gallops, or rubs. + EDEMA lower extremities bilaterally 1+ Respiratory:  Comfortable breathing with no accessory muscle use. Clear breath sounds anteriorly with no wheezes, rales, or rhonchi. GI:  Abdomen nondistended, soft, and TTP epigastric area without rebound. Hyper- active bowel sounds. No enlargement of the liver or spleen. No masses palpable. Rectal:  Deferred Musculoskeletal:  No pitting edema of the lower legs. Neurological:  Gross memory appears intact. Patient is alert and oriented. Psychiatric:  Mood appears appropriate with judgement intact. Lymphatic:  No cervical or supraclavicular adenopathy. Laboratory:   
Recent Labs  
  02/14/19 
0445 02/13/19 
1147 02/13/19 
1120 WBC 13.9* 16.5*  --   
HGB 7.2* 9.8*  --   
HCT 23.5* 32.1*  --   
 400  -- MCV 89.0 88.4  --   
  --  142  
K 3.0*  --  4.6 *  --  108* CO2 25  --  18* BUN 29*  --  29* CREA 1.42*  --  1.64* CA 6.5*  --  7.4* MG  --   --  1.8 GLU 89  --  80  
*  --  199* SGOT 22  --  64* ALT 18  --  25  
TBILI 0.2  --  0.4 ALB 1.0*  --  1.3* TP 4.3  --  6.1 LPSE  --   --  39* CHEST XRay, PA and lateral 2/13/19:  FINDINGS:  
Again demonstrated is complete opacification of the right hemithorax with perihilar surgical clips, and deviation of the trachea and mediastinum to the right. This is suggestive of previous right pneumonectomy and not definitely changed since prior.  Mediastinal and hilar contours are mostly obscured. There is increased mild groundglass opacity in the left base, as well as a small left pleural effusion. Mild chronic linear opacities projecting over right upper lung are unchanged and most compatible with scarring. No evidence of a pneumothorax. Diffusely low bone density.   
IMPRESSION: 1. Left basilar infiltrate and small left pleural effusion. 2. Chronic right pneumonectomy change again noted.  
  
 
Assessment:  
 
Active Problems: 
  Atrial fibrillation with rapid ventricular response ([address] (4/27/2018) Systolic CHF, acute on chronic ([address] (4/27/2018) Solitary right kidney (4/27/2018) S/P lobectomy of lung (4/27/2018) Pneumonia (2/13/2019) Sepsis ([address] (2/13/2019) Syncope and collapse (2/13/2019) Diarrhea (2/13/2019) Edema (2/13/2019) Pleural effusion (2/13/2019) 80 y.o. female with PMH of CHF, HTN, asthma. kidney cancer s/p resection 2016, hx of a lung nodule - not known to be cancerous but s/p pneumonectomy, and recurrent anemia requiring numerous transfusions over the last year who is seen in consultation at the request of Dr. Janet Terrazas for diarrhea. The patient lives in [facility] and her son/ daughter decided to bring her to the hospital here in [place] where they live but state that they to stop along the way for her to have BM and patient fainted at the sink while washing her hands. EMS was called. Patient states that she has been having diarrhea 5-7 times a day for 3 months which she describes as brown water without hematochezia or melena. She has some intermittent epigastric pain but no N/V. She has had difficulty swallowing for years since her pneumonectomy as this has caused a mediastinal shift. According to family, Ms. Estrellita Villasenor has had recurrent anemia of unclear etiology. She was apparently transfused in April 2018 and again received 2 units in December 2018 (in [facility]).  She was scheduled for a colonoscopy in December for evaluation of anemia but was unable to tolerate the prep. EGD was not recommended because of her anatomy. In the ER here, she is found to have CXR concerning for pnuemonia and is on Zosyn and Vanco. Lactic acid level is elevated and pt has lower extremity edema concerning for volume overload- started on IV Lasix. Patient has acute on CKD and normocytic anemia with drop from 9.8 on arrival to 7.2 now. She is receiving a blood transfusion now. Creatinine improving with hydration. She is on remote tele for syncope evaluation and is to have echo. Blood cultures, urine cultures, and Cdiff are pending.   
 
 
Plan:  
 
- will place add on labs for iron studies (hopefully able to obtain results prior to transfusion) 
- continue to trend H/H and transfuse prn ( with caution to avoid volume overload) 
- continue Protonix 40mg - increase to BID for ? GIB 
- obtain stool for blood, Cdiff, culture, lactoferrin 
- agree with broad spectrum abx for possible PNA  
-  Consider colonoscopy if there is no sign of colonic infection once overall status improves. Will have Dr. Messi Ireland review CXR to determine if EGD is possible with altered anatomy. [place], [place] Patient is seen and examined in collaboration with Dr. Messi Ireland. Assessment and plan as per Dr. Messi Ireland. ATTENDING NOTE:  I have seen the patient and agree with the above assessment and plan. Patient with abdominal pain, diarrhea and drop in hemoglobin with normocytic anemia. She also has pneumonia, elevated lactate and clinical evidence of volume overload. Based on patient's comorbid conditions and current acute illness, the risks of sedating for endoscopy likely outweigh the potential benefits. Would favor a conservative management strategy. Will consider EGD and colonoscopy once clinical status improves if symptoms persist and no etiology is identified by stool studies.   
 
Osvaldo Atwood MD

## 2019-02-14 NOTE — PROGRESS NOTES
02/14/19 1309 Dual Skin Pressure Injury Assessment Dual Skin Pressure Injury Assessment [facility]DL Second Care Provider (Based on [address] [place] [address]) REILLY YOUNGER No breakdown noted, sacral area is slightly reddened.

## 2019-02-14 NOTE — ROUTINE PROCESS
TRANSFER - OUT REPORT: 
 
Verbal report given to Hernán Garcia RN on Sonag Pap  being transferred to [facility] for routine progression of care Report consisted of patients Situation, Background, Assessment and  
Recommendations(SBAR). Information from the following report(s) SBAR, ED Summary, MAR, Recent Results and Cardiac Rhythm Controlled A-fib was reviewed with the receiving nurse. Lines:  
Peripheral IV 02/13/19 Right External jugular (Active) Site Assessment Clean, dry, & intact 2/13/2019  1:02 PM  
Phlebitis Assessment 0 2/13/2019  1:02 PM  
Infiltration Assessment 0 2/13/2019  1:02 PM  
Dressing Status Clean, dry, & intact 2/13/2019  1:02 PM  
Dressing Type Transparent 2/13/2019  1:02 PM  
Hub Color/Line Status Pink 2/13/2019  1:02 PM  
  
 
Opportunity for questions and clarification was provided. Patient transported with: 
 Registered Nurse due to blood transfusion.

## 2019-02-14 NOTE — PROGRESS NOTES
Hospitalist Progress Note Admit Date:  2019 11:29 AM  
Name:  Marie Dawson Age:  80 y.o. 
:  1932 MRN:  [id number] PCP:  Constanza, Not On File Treatment Team: Attending Provider: Sara Balderas MD; Care Manager: Anne Key Consulting Provider: TRACIE Chiang; Utilization Review: Marcela Victor RN As per Prior notes: \"[facility] HPI:  
80yr old female with pmhx sig for chf, htn, asthma. kidney cancer s/p resection, hx of a lung nodule - not known to be cancerous but s/p pneumonectomy. The patient states she has been unwell for months, with diarrhea for months. .. Last admitted to hospital in [address] [place] [address] in December after [facility], she was given blood, scheduled for colonoscopy but poor prep so it was not done. She passed at her home in [address] [place] [address] on Saturday- daughter went to get her and brought her [place]. ... She wanted to go to the bank today. . But needed to use the bathroom on the way. . They stopped at [facility]-- the pt had a bm and passed out at the sink while washing her hands. .. Ems - called. .. In the er cxr concerning for pneumonia; lactic acid elevated. hospitalist asked to admit\" 2019- the pt was seen - no new complaints; no further episodes of diarrhea. Receiving the 2 und of 2 units prbcs secondary to drop in hb. Awaiting old records - requested from [employer] systems reviewed and negative except as noted in HPI. Past Medical History:  
Diagnosis Date  Arthritis  Asthma  CAD (coronary artery disease)  [facility] [address]  Chronic kidney disease   
 kidney removed  Heart failure ([facility] [address]  Hypertension  Psychiatric disorder   
 depresion  PUD (peptic ulcer disease)   
 acid reflux Past Surgical History:  
Procedure Laterality Date  HX HEENT    
 eye surgery  HX OTHER SURGICAL    
 lung  HX UROLOGICAL Allergies Allergen Reactions  Atenolol Swelling Social History Tobacco Use  Smoking status: Former Smoker  Smokeless tobacco: Never Used Substance Use Topics  Alcohol use: No  
  
History reviewed. No pertinent family history. Immunization History Administered Date(s) Administered  TB Skin Test (PPD) Intradermal 04/27/2018 PTA Medications: 
Prior to Admission Medications Prescriptions Last Dose Informant Patient Reported? Taking?  
aspirin 81 mg chewable tablet   Yes Yes Sig: Take 81 mg by mouth daily. calcium carbonate (TUMS) 200 mg calcium (500 mg) chew   Yes Yes Sig: Take 1 Tab by mouth every six (6) hours as needed. Indications: Heartburn  
dilTIAZem (CARDIZEM) 120 mg tablet   Yes Yes Sig: Take 120 mg by mouth daily. ferrous sulfate 325 mg (65 mg iron) tablet   No Yes Sig: Take 1 Tab by mouth daily (with breakfast). furosemide (LASIX) 40 mg tablet   Yes Yes Sig: Take  by mouth daily. lisinopril (PRINIVIL, ZESTRIL) 5 mg tablet   Yes Yes Sig: Take 5 mg by mouth daily. loperamide (IMMODIUM) 2 mg tablet   Yes Yes Sig: Take 2 mg by mouth four (4) times daily as needed for Diarrhea. Indications: Diarrhea  
omeprazole (PRILOSEC) 40 mg capsule   Yes Yes Sig: Take 40 mg by mouth daily. simvastatin (ZOCOR) 20 mg tablet   Yes Yes Sig: Take 20 mg by mouth nightly. Facility-Administered Medications: None Objective:  
 
Patient Vitals for the past 24 hrs: 
 Temp Pulse Resp BP SpO2  
02/14/19 1610 97.5 °F (36.4 °C) 72 18 (!) 88/55 96 % 02/14/19 1510 97 °F (36.1 °C) 84 18 92/60 97 % 02/14/19 1350 96.4 °F (35.8 °C) 93 18 95/66 95 % 02/14/19 1323 96.7 °F (35.9 °C) 86 18 101/65   
02/14/19 1230 96.7 °F (35.9 °C) 86 18 101/65   
02/14/19 1157 97.3 °F (36.3 °C) (!) 104 16 95/62 100 % 02/14/19 1057 97.2 °F (36.2 °C) 96 16 106/58 100 % 02/14/19 1040  93  106/64   
02/14/19 1022 97.2 °F (36.2 °C) 100 16 104/59 100 % 02/14/19 0947 97.3 °F (36.3 °C) (!) 115 14 90/53 100 % 02/14/19 0739 97.5 °F (36.4 °C) (!) 101 14 98/53 100 % 02/14/19 0612  83 16 96/52 97 % 02/14/19 0512  82 16 99/58 96 % 02/14/19 0412  84 16 90/53 96 % 02/14/19 0312  82 16 90/56 95 % 02/14/19 0212  88 20 90/51 95 % 02/14/19 0112  79 16 90/54 94 % 02/14/19 0033  88 16 (!) 76/53 97 % 02/14/19 0012  88 16 (!) 82/52 96 % 02/13/19 2312  90 16 91/52 95 % 02/13/19 2212  89 20 (!) 88/51 100 % 02/13/19 2112  89 20 96/58   
02/13/19 2012  88 20 90/55 95 % 02/13/19 1917 97.7 °F (36.5 °C) 90   94 % 02/13/19 1915   20 (!) 88/56 96 % Oxygen Therapy O2 Sat (%): 96 % (02/14/19 1610) Pulse via Oximetry: 82 beats per minute (02/14/19 0612) O2 Device: Room air (02/14/19 1034) Intake/Output Summary (Last 24 hours) at 2/14/2019 1614 Last data filed at 2/14/2019 1240 Gross per 24 hour Intake 2517.5 ml Output  Net 2517.5 ml Physical Exam: 
General:    Well nourished. Alert. Loss of vision left eye Eyes:   Normal sclera. Extraocular movements intact. ENT:  Normocephalic, atraumatic. Moist mucous membranes CV:   RRR. No m/r/g. Peripheral pulses present. Capillary refill normal 
Lungs:  CTAB. No wheezing, rhonchi, or rales. Abdomen: Soft, nontender, nondistended. Bowel sounds normal.  
Extremities: Warm and dry. No cyanosis; bilateral pitting edema. Sacral edema Neurologic: CN II-XII grossly intact. Sensation intact. Skin:     No rashes or jaundice. Normal coloration Psych:  Normal mood and affect. I reviewed the labs, imaging, EKGs, telemetry, and other studies done this admission. Data Review:  
Recent Results (from the past 24 hour(s)) CULTURE, BLOOD Collection Time: 02/13/19  4:44 PM  
Result Value Ref Range Special Requests: RIGHT JUGULAR VEIN Culture result: NO GROWTH AFTER 18 HOURS    
CULTURE, BLOOD Collection Time: 02/13/19  4:47 PM  
Result Value Ref Range Special Requests: RIGHT 
ARM Culture result: NO GROWTH AFTER 18 HOURS    
POC TROPONIN-I Collection Time: 02/13/19  5:07 PM  
Result Value Ref Range Troponin-I (POC) 0.08 (H) 0.02 - 0.05 ng/ml POC LACTIC ACID Collection Time: 02/13/19  5:09 PM  
Result Value Ref Range Lactic Acid (POC) 0.92 0.5 - 1.9 mmol/L  
CULTURE, BLOOD Collection Time: 02/13/19  9:03 PM  
Result Value Ref Range Special Requests: RIGHT JUGULAR VEIN Culture result: NO GROWTH AFTER 13 HOURS    
TROPONIN I Collection Time: 02/13/19  9:04 PM  
Result Value Ref Range Troponin-I, Qt. 0.08 (H) 0.02 - 0.05 NG/ML  
TROPONIN I Collection Time: 02/14/19  4:45 AM  
Result Value Ref Range Troponin-I, Qt. 0.08 (H) 0.02 - 0.05 NG/ML  
CBC WITH AUTOMATED DIFF Collection Time: 02/14/19  4:45 AM  
Result Value Ref Range WBC 13.9 (H) 4.3 - 11.1 K/uL  
 RBC 2.64 (L) 4.05 - 5.2 M/uL HGB 7.2 (L) 11.7 - 15.4 g/dL HCT 23.5 (L) 35.8 - 46.3 % MCV 89.0 79.6 - 97.8 FL  
 MCH 27.3 26.1 - 32.9 PG  
 MCHC 30.6 (L) 31.4 - 35.0 g/dL RDW 21.2 (H) 11.9 - 14.6 % PLATELET 806 708 - 605 K/uL MPV 10.3 9.4 - 12.3 FL ABSOLUTE NRBC 0.00 0.0 - 0.2 K/uL  
 DF AUTOMATED NEUTROPHILS 83 (H) 43 - 78 % LYMPHOCYTES 10 (L) 13 - 44 % MONOCYTES 4 4.0 - 12.0 % EOSINOPHILS 1 0.5 - 7.8 % BASOPHILS 0 0.0 - 2.0 % IMMATURE GRANULOCYTES 1 0.0 - 5.0 %  
 ABS. NEUTROPHILS 11.6 (H) 1.7 - 8.2 K/UL  
 ABS. LYMPHOCYTES 1.4 0.5 - 4.6 K/UL  
 ABS. MONOCYTES 0.6 0.1 - 1.3 K/UL  
 ABS. EOSINOPHILS 0.2 0.0 - 0.8 K/UL  
 ABS. BASOPHILS 0.0 0.0 - 0.2 K/UL  
 ABS. IMM. GRANS. 0.1 0.0 - 0.5 K/UL METABOLIC PANEL, COMPREHENSIVE Collection Time: 02/14/19  4:45 AM  
Result Value Ref Range Sodium 142 136 - 145 mmol/L Potassium 3.0 (L) 3.5 - 5.1 mmol/L Chloride 109 (H) 98 - 107 mmol/L  
 CO2 25 21 - 32 mmol/L Anion gap 8 mmol/L Glucose 89 65 - 100 mg/dL  BUN 29 (H) 8 - 23 MG/DL  
 Creatinine 1.42 (H) 0.6 - 1.0 MG/DL  
 GFR est AA 45 (L) >60 ml/min/1.73m2 GFR est non-AA 37 ml/min/1.73m2 Calcium 6.5 (L) 8.3 - 10.4 MG/DL Bilirubin, total 0.2 0.2 - 1.1 MG/DL  
 ALT (SGPT) 18 12 - 65 U/L  
 AST (SGOT) 22 15 - 37 U/L Alk. phosphatase 142 (H) 50 - 136 U/L Protein, total 4.3 g/dL Albumin 1.0 (L) 3.2 - 4.6 g/dL Globulin 3.3 2.3 - 3.5 g/dL A-G Ratio 0.3 FERRITIN Collection Time: 02/14/19  4:45 AM  
Result Value Ref Range Ferritin 47 8 - 388 NG/ML  
TRANSFERRIN SATURATION Collection Time: 02/14/19  4:45 AM  
Result Value Ref Range Iron 24 ug/dL TIBC 116 (L) 250 - 450 ug/dL Transferrin Saturation 21 % TYPE + CROSSMATCH Collection Time: 02/14/19  7:58 AM  
Result Value Ref Range Crossmatch Expiration 02/17/2019 ABO/Rh(D) O POSITIVE Antibody screen NEG Unit number [id number] Blood component type [facility] Unit division 00 Status of unit ISSUED Crossmatch result Compatible Unit number [id number] Blood component type [facility] Unit division 00 Status of unit ISSUED Crossmatch result Compatible All Micro Results Procedure Component Value Units Date/Time CULTURE, BLOOD [[id number]] Collected:  02/13/19 2103 Order Status:  Completed Specimen:  Blood Updated:  02/14/19 1118 Special Requests: --     
  RIGHT JUGULAR VEIN Culture result: NO GROWTH AFTER 13 HOURS     
 CULTURE, BLOOD [[id number]] Collected:  02/13/19 1644 Order Status:  Completed Specimen:  Blood Updated:  02/14/19 1118 Special Requests: --     
  RIGHT JUGULAR VEIN Culture result: NO GROWTH AFTER 18 HOURS     
 CULTURE, BLOOD [[id number]] Collected:  02/13/19 1647 Order Status:  Completed Specimen:  Blood Updated:  02/14/19 1118 Special Requests: --     
  RIGHT 
ARM Culture result: NO GROWTH AFTER 18 HOURS     
 CULTURE, STOOL [[id number]] Order Status:  Sent Specimen:  Stool CULTURE, URINE [[id number]] Collected:  02/13/19 1556 Order Status:  Completed Specimen:  Urine Updated:  02/14/19 7038 Special Requests: NO SPECIAL REQUESTS Culture result:    
  NO GROWTH AFTER SHORT PERIOD OF INCUBATION. FURTHER RESULTS TO FOLLOW AFTER OVERNIGHT INCUBATION. C. DIFFICILE AG & TOXIN A/B [[id number]] Order Status:  Sent Specimen:  Stool Other Studies: No results found. Assessment and Plan:  
 
Hospital Problems as of 2/14/2019 Never Reviewed Codes Class Noted - Resolved POA Pneumonia ICD-10-CM: J18.9 ICD-9-CM: 735  2/13/2019 - Present Unknown Sepsis ([facility] [address] ICD-10-CM: A41.9 ICD-9-CM: 038.9, 995.91  2/13/2019 - Present Unknown Syncope and collapse ICD-10-CM: R55 
ICD-9-CM: 780.2  2/13/2019 - Present Yes Diarrhea ICD-10-CM: R19.7 ICD-9-CM: 787.91  2/13/2019 - Present Yes Edema ICD-10-CM: R60.9 ICD-9-CM: 782.3  2/13/2019 - Present Yes Pleural effusion ICD-10-CM: J90 ICD-9-CM: 511.9  2/13/2019 - Present Yes Atrial fibrillation with rapid ventricular response (HCC) ICD-10-CM: I48.91 
ICD-9-CM: 427.31  4/27/2018 - Present Yes Systolic CHF, acute on chronic (HCC) (Chronic) ICD-10-CM: D35.73 ICD-9-CM: 428.23, 428.0  4/27/2018 - Present Yes Solitary right kidney (Chronic) ICD-10-CM: Q60.0 ICD-9-CM: 753.0  4/27/2018 - Present Yes S/P lobectomy of lung (Chronic) ICD-10-CM: Z90.2 ICD-9-CM: V45.89  4/27/2018 - Present Yes PLAN: 
· Syncope- continue remote tele, trops, echo, request placed for old records from [facility] system · Pneumonia- continue vanc and zosyn · Sepsis- resolved · Effusion & Edema- concerning for chf vs volume overload- BNP wnl - iv lasix as bp will tolerate · Diarrhea- no further episodes so stool studies are pending · Concern for uti- f/up cultures · Hx of anemia requiring transfusions- hb dropped so getting 2 units prbcs- gi consulted for possible scope; protonix bid · Further workup and mgt based on her clinical course · Ppd 
· Pt 
· ot · Request old records from [facility] · Pt has not walked in about 1 week- she walks with a rollator DVT ppx:  scds Anticipated DC needs:  outpt pcp, Code status:  Full Estimated LOS:  Greater than 2 midnights Risk:  high Signed: 
Siva Moss MD

## 2019-02-14 NOTE — CONSULTS
[facility] Consult Note Primary GI Physician: Dr. Win Boudreaux Referring Provider:  Dr. Gretchen Anne Consult Date:  2/14/2019 Admit Date:  2/13/2019 Chief Complaint: diarrhea Subjective:  
 
History of Present Illness:  Patient is a 80 y.o. female with PMH of CHF, HTN, asthma. kidney cancer s/p resection 2016, hx of a lung nodule - not known to be cancerous but s/p pneumonectomy, who is seen in consultation at the request of Dr. Gretchen Anne for diarrhea. The patient lives in [facility] but has not been doing well for several months. Her son/ daughter decided to bring her to the hospital here in [place] where they live but state that they to stop along the way for her to have  BM and patient fainted at the sink while washing her hands. EMS was called. Patient states that she has been having diarrhea 5-7 times a day for 3 months which she describes as brown water. She has some intermittent epigastric pain but no N/V. She has had difficulty swallowing for years since her pneumonectomy as this has caused a mediastinal shift. According to family, Ms. Carol Troncoso has had recurrent anemia of unclear etiology. She was apparently transfused in April 2018 and again received 2 units in December 2018 (in [facility]). She was scheduled for a colonoscopy in December for evaluation of anemia but was unable to tolerate the prep. EGD was not recommended because of her anatomy. Patient denies any history of hematochezia or melena. In the ER, she is found to have CXR concerning for pnuemonia and is on Zosyn and Vanco. Lactic acid level is elevated and pt has lower extremity edema concerning for volume overload. She has been started on IV Lasix. Patient has BANDAR and normocytic anemia with drop from 9.8 on arrival to 7.2 now. She is receiving a blood transfusion now. BANDAR improving with hydration.  She is on remote tele for syncope evaluation and is to have echo. Blood cultures, urine cultures, and Cdiff are pending.   
 
 
 
 
 
 
 
PMH: 
Past Medical History:  
Diagnosis Date  Arthritis  Asthma  CAD (coronary artery disease)  Cancer ([facility] [address]  Chronic kidney disease   
 kidney removed  Heart failure ([facility] [address]  Hypertension  Psychiatric disorder   
 depresion  PUD (peptic ulcer disease)   
 acid reflux PSH: 
Past Surgical History:  
Procedure Laterality Date  HX HEENT    
 eye surgery  HX OTHER SURGICAL    
 lung  HX UROLOGICAL Allergies: 
No Known Allergies Home Medications: 
Prior to Admission medications Medication Sig Start Date End Date Taking? Authorizing Provider  
ferrous sulfate 325 mg (65 mg iron) tablet Take 1 Tab by mouth daily (with breakfast). 4/28/18  Yes Laila Barr MD  
aspirin 81 mg chewable tablet Take 81 mg by mouth daily. Yes Provider, Historical  
omeprazole (PRILOSEC) 40 mg capsule Take 40 mg by mouth daily. Yes Other, MD Lorena  
furosemide (LASIX) 40 mg tablet Take  by mouth daily. Yes Other, MD Lorena  
simvastatin (ZOCOR) 20 mg tablet Take 20 mg by mouth nightly. Yes Other, MD Lorena  
dilTIAZem (CARDIZEM) 120 mg tablet Take 120 mg by mouth daily. Yes Other, MD Lorena  
lisinopril (PRINIVIL, ZESTRIL) 5 mg tablet Take 5 mg by mouth daily. Yes Other, MD Lorena  
loperamide (IMMODIUM) 2 mg tablet Take 2 mg by mouth four (4) times daily as needed for Diarrhea. Indications: Diarrhea   Yes Provider, Historical  
calcium carbonate (TUMS) 200 mg calcium (500 mg) chew Take 1 Tab by mouth every six (6) hours as needed. Indications: Heartburn   Yes Provider, Historical  
 
 
Hospital Medications: 
Current Facility-Administered Medications Medication Dose Route Frequency  0.9% sodium chloride infusion 250 mL  250 mL IntraVENous PRN  potassium chloride (K-DUR, KLOR-CON) SR tablet 40 mEq  40 mEq Oral BID  
  dilTIAZem CD (CARDIZEM CD) capsule 120 mg  120 mg Oral DAILY  sodium chloride (NS) flush 5-10 mL  5-10 mL IntraVENous PRN  
 0.9% sodium chloride infusion  75 mL/hr IntraVENous CONTINUOUS  
 aspirin chewable tablet 81 mg  81 mg Oral DAILY  pantoprazole (PROTONIX) tablet 40 mg  40 mg Oral ACB  simvastatin (ZOCOR) tablet 10 mg  10 mg Oral QHS  piperacillin-tazobactam (ZOSYN) 4.5 g in 0.9% sodium chloride (MBP/ADV) 100 mL  4.5 g IntraVENous Q12H  
 [START ON 2/15/2019] vancomycin (VANCOCIN) 1,000 mg in 0.9% sodium chloride (MBP/ADV) 250 mL  1,000 mg IntraVENous Q36H  
 ondansetron (ZOFRAN) injection 4 mg  4 mg IntraVENous Q4H PRN Current Outpatient Medications Medication Sig  
 ferrous sulfate 325 mg (65 mg iron) tablet Take 1 Tab by mouth daily (with breakfast).  aspirin 81 mg chewable tablet Take 81 mg by mouth daily.  omeprazole (PRILOSEC) 40 mg capsule Take 40 mg by mouth daily.  furosemide (LASIX) 40 mg tablet Take  by mouth daily.  simvastatin (ZOCOR) 20 mg tablet Take 20 mg by mouth nightly.  dilTIAZem (CARDIZEM) 120 mg tablet Take 120 mg by mouth daily.  lisinopril (PRINIVIL, ZESTRIL) 5 mg tablet Take 5 mg by mouth daily.  loperamide (IMMODIUM) 2 mg tablet Take 2 mg by mouth four (4) times daily as needed for Diarrhea. Indications: Diarrhea  calcium carbonate (TUMS) 200 mg calcium (500 mg) chew Take 1 Tab by mouth every six (6) hours as needed. Indications: Heartburn Social History: 
Social History Tobacco Use  Smoking status: Former Smoker  Smokeless tobacco: Never Used Substance Use Topics  Alcohol use: No  
 
 
Pt denies any history of drug use, blood transfusions, or tattoos. Family History: 
History reviewed. No pertinent family history. Review of Systems: A detailed 10 system ROS is obtained, with pertinent positives as listed above. All others are negative. Diet: not ordered. Objective:  
 
Physical Exam: 
Vitals: Visit Vitals /64 Pulse 93 Temp 97.2 °F (36.2 °C) Resp 16 Ht 5' 5.5\" (1.664 m) Wt 54.4 kg (120 lb) SpO2 100% BMI 19.67 kg/m² Gen:  Pt is alert, cooperative, no acute distress Skin:  Extremities and face reveal no rashes. HEENT: Sclerae anicteric. Extra-occular muscles are intact. No oral ulcers. No abnormal pigmentation of the lips. The neck is supple. Cardiovascular: Regular rate and rhythm. No murmurs, gallops, or rubs. + EDEMA lower extremities bilaterally 1+ Respiratory:  Comfortable breathing with no accessory muscle use. Clear breath sounds anteriorly with no wheezes, rales, or rhonchi. GI:  Abdomen nondistended, soft, and TTP epigastric area without rebound. Hyper- active bowel sounds. No enlargement of the liver or spleen. No masses palpable. Rectal:  Deferred Musculoskeletal:  No pitting edema of the lower legs. Neurological:  Gross memory appears intact. Patient is alert and oriented. Psychiatric:  Mood appears appropriate with judgement intact. Lymphatic:  No cervical or supraclavicular adenopathy. Laboratory:   
Recent Labs  
  02/14/19 
0445 02/13/19 
1147 02/13/19 
1120 WBC 13.9* 16.5*  --   
HGB 7.2* 9.8*  --   
HCT 23.5* 32.1*  --   
 400  -- MCV 89.0 88.4  --   
  --  142  
K 3.0*  --  4.6 *  --  108* CO2 25  --  18* BUN 29*  --  29* CREA 1.42*  --  1.64* CA 6.5*  --  7.4* MG  --   --  1.8 GLU 89  --  80  
*  --  199* SGOT 22  --  64* ALT 18  --  25  
TBILI 0.2  --  0.4 ALB 1.0*  --  1.3* TP 4.3  --  6.1 LPSE  --   --  39* CHEST XRay, PA and lateral 2/13/19:  FINDINGS:  
Again demonstrated is complete opacification of the right hemithorax with perihilar surgical clips, and deviation of the trachea and mediastinum to the right. This is suggestive of previous right pneumonectomy and not definitely changed since prior.  Mediastinal and hilar contours are mostly obscured. There is increased mild groundglass opacity in the left base, as well as a small left pleural effusion. Mild chronic linear opacities projecting over right upper lung are unchanged and most compatible with scarring. No evidence of a pneumothorax. Diffusely low bone density.   
IMPRESSION: 1. Left basilar infiltrate and small left pleural effusion. 2. Chronic right pneumonectomy change again noted.  
  
 
Assessment:  
 
Active Problems: 
  Atrial fibrillation with rapid ventricular response ([address] (4/27/2018) Systolic CHF, acute on chronic ([address] (4/27/2018) Solitary right kidney (4/27/2018) S/P lobectomy of lung (4/27/2018) Pneumonia (2/13/2019) Sepsis ([address] (2/13/2019) Syncope and collapse (2/13/2019) Diarrhea (2/13/2019) Edema (2/13/2019) Pleural effusion (2/13/2019) 80 y.o. female with PMH of CHF, HTN, asthma. kidney cancer s/p resection 2016, hx of a lung nodule - not known to be cancerous but s/p pneumonectomy, and recurrent anemia requiring numerous transfusions over the last year who is seen in consultation at the request of Dr. Dana Wang for diarrhea. The patient lives in [facility] and her son/ daughter decided to bring her to the hospital here in [place] where they live but state that they to stop along the way for her to have BM and patient fainted at the sink while washing her hands. EMS was called. Patient states that she has been having diarrhea 5-7 times a day for 3 months which she describes as brown water without hematochezia or melena. She has some intermittent epigastric pain but no N/V. She has had difficulty swallowing for years since her pneumonectomy as this has caused a mediastinal shift. According to family, Ms. Orquidea Johnson has had recurrent anemia of unclear etiology. She was apparently transfused in April 2018 and again received 2 units in December 2018 (in [facility]).  She was scheduled for a colonoscopy in December for evaluation of anemia but was unable to tolerate the prep. EGD was not recommended because of her anatomy. In the ER here, she is found to have CXR concerning for pnuemonia and is on Zosyn and Vanco. Lactic acid level is elevated and pt has lower extremity edema concerning for volume overload- started on IV Lasix. Patient has acute on CKD and normocytic anemia with drop from 9.8 on arrival to 7.2 now. She is receiving a blood transfusion now. Creatinine improving with hydration. She is on remote tele for syncope evaluation and is to have echo. Blood cultures, urine cultures, and Cdiff are pending.   
 
 
Plan:  
 
- will place add on labs for iron studies (hopefully able to obtain results prior to transfusion) 
- continue to trend H/H and transfuse prn ( with caution to avoid volume overload) 
- continue Protonix 40mg - increase to BID for ? GIB 
- obtain stool for blood, Cdiff, culture, lactoferrin 
- agree with broad spectrum abx for possible PNA  
-  Consider colonoscopy if there is no sign of colonic infection once overall status improves. Will have Dr. Chris Campos review CXR to determine if EGD is possible with altered anatomy. [place], [place] Patient is seen and examined in collaboration with Dr. Chris Campos. Assessment and plan as per Dr. Chris Campos. ATTENDING NOTE:  I have seen the patient and agree with the above assessment and plan. Patient with abdominal pain, diarrhea and drop in hemoglobin with normocytic anemia. She also has pneumonia, elevated lactate and clinical evidence of volume overload. Based on patient's comorbid conditions and current acute illness, the risks of sedating for endoscopy likely outweigh the potential benefits. Would favor a conservative management strategy. Will consider EGD and colonoscopy once clinical status improves if symptoms persist and no etiology is identified by stool studies.   
 
Jessica Sung MD

## 2019-02-14 NOTE — PROGRESS NOTES
Shift assessment complete. Pt alert and oriented to person and place. No signs of acute distress. Resp even and unlabored. Pt completed unit of blood upon arrival. Pt denies pain. Call light within reach. Pt instructed to call for assistance.

## 2019-02-14 NOTE — PROGRESS NOTES
Vancomycin Consult MD ordering: Tamir Sierra ID following? no Indication: HAP 
DOT:  7 days Goal level(s): 15 - 20 Ht Readings from Last 1 Encounters:  
19 5' 5.5\" (1.664 m) Wt Readings from Last 1 Encounters:  
19 54.4 kg (120 lb) Temp (24hrs), Av.1 °F (36.2 °C), Min:96.4 °F (35.8 °C), Max:97.7 °F (36.5 °C) Dosing weight: 54 kg 
80 y.o. Date:  Dose/Freq Admin Times Level/Time:  
 1250 mg IV x 1 dose 1906  1 gm IV q36h No dose due 2/15 1 gm IV q36h (0600) Tr due at 0500 Recent Labs  
  19 
0445 19 
1709 19 
1237 19 
1147 19 
1120 BUN 29*  --   --   --  29* CREA 1.42*  --   --   --  1.64* WBC 13.9*  --   --  16.5*  --   
PCT  --   --   --   --  0.1 LACPOC  --  0.92 2.68*  --   --   
 
Estimated Creatinine Clearance: 24.4 mL/min (A) (based on SCr of 1.42 mg/dL (H)). Day 2 Assessment and Plan: 
Continue current dose of Vancomycin 1 gm IV every 36 hours. Next dose is due tomorrow (2/15) at 0600. Trough scheduled for tomorrow at 0500, prior to 0600 dose. Pharmacy will continue to follow. Thank you, Simone Brandon, PharmD

## 2019-02-15 NOTE — PROGRESS NOTES
Spiritual Care Visit, initial visit. Visited with patient at bedside. Prayed for patient's healing and health. Visit by Rony Romo, Staff .  Liz., Peri.B., B.A.

## 2019-02-15 NOTE — PROGRESS NOTES
Shift assessment completed. A/O x 4, pt voiced no c/o pain, acute distress or discomfort at this time. Continues to have loose stools. Denies nausea or vomiting. All safety measures in place. Continue to monitor.

## 2019-02-15 NOTE — CONSULTS
[facility] CARDIOLOGY CONSULT NOTE 
 
2/15/2019 12:59 PM 
 
Admit Date: 2/13/2019 Admit Diagnosis: Pneumonia [J18.9]; Sepsis ([facility] [A41.9] Subjective:  
81 yo with chronic afib on rate control recurrent anemia transfusions. NO ANTICOAGULATION PER CARDIOLOGIST AT [facility]. She is admitted after syncope in a bathroom. She was anemic requiring blood. She is on chronic lasix and also has chronic diarrhea etiology uncertain 6-7 bms watery brown a day . Also on lasix chronically Allergies Allergen Reactions  Atenolol Swelling Past Medical History:  
Diagnosis Date  Arthritis  Asthma  CAD (coronary artery disease)  Cancer ([facility]  Chronic kidney disease   
 kidney removed  Heart failure ([facility]  Hypertension  Psychiatric disorder   
 depresion  PUD (peptic ulcer disease)   
 acid reflux Family History:  No family history of premature CAD or sudden cardiac death. Social History:  No recent alcohol, tobacco, or illicit drug use. Review of Systems: unchanged from admission notes 
 except for : 
Constitutional-  
 
 
Cardiac- see HPI Pulmonary- no wheezing or sputum production Gastrointestinal- no diarrhea or constipation Genitourinary- no hematuria Neurologic- no syncope or falls     headache Musculoskeletal- no arthritis or myalgia Vascular- no claudication or nonhealing ulcers Objective:  
  
Vitals:  
 02/15/19 0818 02/15/19 0844 02/15/19 1040 02/15/19 1118 BP: 104/70 105/70  104/66 Pulse: 70 98  85 Resp:  15  18 Temp:  95.1 °F (35.1 °C)  95.2 °F (35.1 °C) SpO2:      
Weight:   52.4 kg (115 lb 9.6 oz) Height:      
 
 
Physical Exam: 
Neuro:  alert oriented and cooperative  no focal deficits evident Skin: warm and dry HEENT:  NC/AT, conjunctiva noninjected, sclera anicteric, oropharynx clear Neck: supple without adenopathy or thyromegaly CV: regular rate and rhythm, no murmurs, rubs, or gallops Lungs: clear bilaterally Abdomen: soft,  normal bowel sounds   no pulsatile mass or bruit evident Extremities: No cyanosis or edema, distal perfusion intact Psychiatric: mood and affect appropriate [unfilled] [unfilled] Data Review:  
Recent Labs  
  02/15/19 
0850 02/14/19 
0445  02/13/19 
1120  142  --  142  
K 3.6 3.0*  --  4.6 MG  --   --   --  1.8 BUN 24* 29*  --  29* CREA 1.43* 1.42*  --  1.64* GLU 72 89  --  80 WBC 15.5* 13.9*   < >  --   
HGB 12.2 7.2*   < >  --   
HCT 40.4 23.5*   < >  --   
 278   < >  --   
 < > = values in this interval not displayed. Assessment and Plan: Active Problems: 
  Atrial fibrillation with rapid ventricular response ([address] (4/27/2018) Atrial fibrillation with rapid ventricular response ([address] (4/27/2018)   check on digoxin level  ?  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 Will follow . Please call with any questions or suggestions. Bernarda Stover MD [facility] 
[phone] [facility] Cardiology

## 2019-02-15 NOTE — PROGRESS NOTES
GI DAILY PROGRESS NOTE Admit Date:  2/13/2019 Today's Date:  2/15/2019 CC:  Abdominal pain; anemia; diarrhea Subjective:  
 
Patient reports n/v x 2 days but no vomiting today. Got a little nauseated earlier. Complains of diarrhea x 3 months. Had 3 stools yesterday. None yet today. Also epigastric pain, sometimes radiating throughout the abdomen, intermittent for the last 3 months and a little better with defecating. Reports weight loss during this time but cannot quantify. Was seen in [facility] for these symptoms and colonoscopy discussed but EGD was deferred because her son says \"her heart wraps around her esophagus\" (?) since her pneumonectomy. Denies NSAIDs use. Was taking omeprazole at home for reflux. Medications:  
Current Facility-Administered Medications Medication Dose Route Frequency  [START ON 2/16/2019] vancomycin (VANCOCIN) 1,000 mg in 0.9% sodium chloride (MBP/ADV) 250 mL  1,000 mg IntraVENous Q18H  
 furosemide (LASIX) injection 20 mg  20 mg IntraVENous Q12H  
 albumin human 25% (BUMINATE) solution 12.5 g  12.5 g IntraVENous Q12H  potassium chloride (K-DUR, KLOR-CON) SR tablet 20 mEq  20 mEq Oral BID  acetaminophen (TYLENOL) tablet 650 mg  650 mg Oral Q6H PRN  
 oxyCODONE IR (ROXICODONE) tablet 5 mg  5 mg Oral Q6H PRN  
 0.9% sodium chloride infusion 250 mL  250 mL IntraVENous PRN  
 dilTIAZem CD (CARDIZEM CD) capsule 120 mg  120 mg Oral DAILY  pantoprazole (PROTONIX) tablet 40 mg  40 mg Oral ACB&D  
 sodium chloride (NS) flush 5-10 mL  5-10 mL IntraVENous PRN  
 0.9% sodium chloride infusion  75 mL/hr IntraVENous CONTINUOUS  
 aspirin chewable tablet 81 mg  81 mg Oral DAILY  simvastatin (ZOCOR) tablet 10 mg  10 mg Oral QHS  piperacillin-tazobactam (ZOSYN) 4.5 g in 0.9% sodium chloride (MBP/ADV) 100 mL  4.5 g IntraVENous Q12H  
 ondansetron (ZOFRAN) injection 4 mg  4 mg IntraVENous Q4H PRN Review of Systems: No chest pain. Breathing is ok. Was a little SOB earlier when she got up to use the bathroom Diet:  Clear liquids Objective:  
Vitals: 
Visit Vitals /66 (BP 1 Location: Left arm, BP Patient Position: At rest;Supine) Pulse 85 Temp 95.2 °F (35.1 °C) Resp 18 Ht 5' 5.5\" (1.664 m) Wt 52.4 kg (115 lb 9.6 oz) SpO2 96% Breastfeeding? No  
BMI 18.94 kg/m² Intake/Output: 
No intake/output data recorded. 02/13 1901 - 02/15 0700 In: 3643.5 [I.V.:2881] Out: - Exam: 
General appearance: alert, cooperative, no distress Lungs: clear to auscultation bilaterally anteriorly Heart: regular rate and rhythm Abdomen: soft, non-tender. Bowel sounds normal. No masses, no organomegaly Extremities: extremities normal, atraumatic, no cyanosis or edema Neuro:  alert and oriented Data Review (Labs):   
Recent Labs  
  02/15/19 
0850 02/14/19 
0445 02/13/19 
1147 02/13/19 
1120 WBC 15.5* 13.9* 16.5*  --   
HGB 12.2 7.2* 9.8*  --   
HCT 40.4 23.5* 32.1*  --   
 278 400  -- MCV 88.8 89.0 88.4  --   
 142  --  142  
K 3.6 3.0*  --  4.6 * 109*  --  108* CO2 20* 25  --  18* BUN 24* 29*  --  29* CREA 1.43* 1.42*  --  1.64* CA 7.1* 6.5*  --  7.4* MG  --   --   --  1.8 GLU 72 89  --  80  
AP  --  142*  --  199* SGOT  --  22  --  64* ALT  --  18  --  25  
TBILI  --  0.2  --  0.4 ALB  --  1.0*  --  1.3* TP  --  4.3  --  6.1 LPSE  --   --   --  39* Assessment:  
 
Active Problems: 
  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 Iron studies are not consistent with iron deficiency. Stool studies pending. Hgb 12.2 s/p 2 units PRBCs. Plan:  
1) await results of stool studies and cardiology consult 2) continue PPI po BID 3) if symptoms persist and stool studies unrevealing, will consider possible EGD and colonoscopy on Monday after her cardiopulmonary issues have improved/resolved 4) because of her anatomy post pneumonectomy, apparently affecting her esophagus (and physicians in [facility] thought it was too high risk to perform EGD), would plan UGI or chest CT prior to EGD. Will discuss with Dr. Ave Langford Patient is seen and examined in collaboration with Dr. Simon Magaña. Assessment and plan as per Dr. vAe Langford. TRACIE Hough 
 
ATTENDING NOTE:  I agree with the above assessment and plan.   
 
Simon Magaña MD

## 2019-02-15 NOTE — PROGRESS NOTES
Problem: Interdisciplinary Rounds Goal: Interdisciplinary Rounds Interdisciplinary team rounds were held 2/15/2019 with the following team members:Care Management, Nursing, Nutrition, Pharmacy, Physical Therapy and Physician and the patient and child(arya). Plan of care discussed. See clinical pathway and/or care plan for interventions and desired outcomes.

## 2019-02-15 NOTE — PROGRESS NOTES
Vancomycin Consult MD ordering: Reny MUELLER following? no Indication: HAP 
DOT:  7 days Goal level(s): 15 - 20 Ht Readings from Last 1 Encounters:  
19 5' 5.5\" (1.664 m) Wt Readings from Last 1 Encounters:  
19 54.4 kg (120 lb) Temp (24hrs), Av.1 °F (36.2 °C), Min:96.4 °F (35.8 °C), Max:97.7 °F (36.5 °C) Dosing weight: 54 kg 
80 y.o. Date:  Dose/Freq Admin Times Level/Time:  
 1250 mg IV x 1 dose 1906  1 gm IV q36h No dose due 2/15 1 gm IV q36h 0701 Tr at 0515 was 8.5  
 1  gm IV every 18 hours () Recent Labs  
  19 
0445 19 
1709 19 
1237 19 
1147 19 
1120 BUN 29*  --   --   --  29* CREA 1.42*  --   --   --  1.64* WBC 13.9*  --   --  16.5*  --   
PCT  --   --   --   --  0.1 LACPOC  --  0.92 2.68*  --   --   
 
Estimated Creatinine Clearance: 24.4 mL/min (A) (based on SCr of 1.42 mg/dL (H)). Day 3 Assessment and Plan: 
Increase vancomycin dosing regimen to every 36 hours Next dose is due tomorrow () at 0100. Pharmacy will continue to follow. Thank you, Wiley Felipe PharmD, BCPS

## 2019-02-15 NOTE — PROGRESS NOTES
Hospitalist Progress Note Admit Date:  2019 11:29 AM  
Name:  Aristeo Sandoval Age:  80 y.o. 
:  1932 MRN:  [id number] PCP:  Constanza, Not On File Treatment Team: Attending Provider: Ganesh Piedra MD; Care Manager: Adarsh Yousif Consulting Provider: TRACIE Bustillo; Utilization Review: Anahi Davis RN; Consulting Provider: Kasey Guadarrama MD  
 
As per Prior notes: \"CC: passed out in [place] today after using the bathroom HPI:  
80yr old female with pmhx sig for chf, htn, asthma. kidney cancer s/p resection, hx of a lung nodule - not known to be cancerous but s/p pneumonectomy. The patient states she has been unwell for months, with diarrhea for months. .. Last admitted to hospital in [address] [place] [address] in December after [facility], she was given blood, scheduled for colonoscopy but poor prep so it was not done. She passed at her home in [address] [place] [address] on Saturday- daughter went to get her and brought her [place]. ... She wanted to go to the bank today. . But needed to use the bathroom on the way. . They stopped at [facility]-- the pt had a bm and passed out at the sink while washing her hands. .. Ems - called. .. In the er cxr concerning for pneumonia; lactic acid elevated. hospitalist asked to admit\" 2019- the pt was seen - no new complaints; no further episodes of diarrhea. Receiving the 2 und of 2 units prbcs secondary to drop in hb. Awaiting old records - requested from [facility] 
 
02/15/2019- seen complains of abdominal pain- very upset about being oob to chair - wants to get back in the bed; also complains of belly pain 10 systems reviewed and negative except as noted in HPI. Past Medical History:  
Diagnosis Date  Arthritis  Asthma  CAD (coronary artery disease)  Cancer ([facility]  Chronic kidney disease   
 kidney removed  Heart failure ([facility]  Hypertension  Psychiatric disorder   
 depresion  PUD (peptic ulcer disease)   
 acid reflux Past Surgical History:  
Procedure Laterality Date  HX HEENT    
 eye surgery  HX OTHER SURGICAL    
 lung  HX UROLOGICAL Allergies Allergen Reactions  Atenolol Swelling Social History Tobacco Use  Smoking status: Former Smoker  Smokeless tobacco: Never Used Substance Use Topics  Alcohol use: No  
  
History reviewed. No pertinent family history. Immunization History Administered Date(s) Administered  TB Skin Test (PPD) Intradermal 04/27/2018 PTA Medications: 
Prior to Admission Medications Prescriptions Last Dose Informant Patient Reported? Taking?  
aspirin 81 mg chewable tablet   Yes Yes Sig: Take 81 mg by mouth daily. calcium carbonate (TUMS) 200 mg calcium (500 mg) chew   Yes Yes Sig: Take 1 Tab by mouth every six (6) hours as needed. Indications: Heartburn  
dilTIAZem (CARDIZEM) 120 mg tablet   Yes Yes Sig: Take 120 mg by mouth daily. ferrous sulfate 325 mg (65 mg iron) tablet   No Yes Sig: Take 1 Tab by mouth daily (with breakfast). furosemide (LASIX) 40 mg tablet   Yes Yes Sig: Take  by mouth daily. lisinopril (PRINIVIL, ZESTRIL) 5 mg tablet   Yes Yes Sig: Take 5 mg by mouth daily. loperamide (IMMODIUM) 2 mg tablet   Yes Yes Sig: Take 2 mg by mouth four (4) times daily as needed for Diarrhea. Indications: Diarrhea  
omeprazole (PRILOSEC) 40 mg capsule   Yes Yes Sig: Take 40 mg by mouth daily. simvastatin (ZOCOR) 20 mg tablet   Yes Yes Sig: Take 20 mg by mouth nightly. Facility-Administered Medications: None Objective:  
 
Patient Vitals for the past 24 hrs: 
 Temp Pulse Resp BP SpO2  
02/15/19 0844 95.1 °F (35.1 °C) 98 15 105/70   
02/15/19 0818  70  104/70   
02/15/19 0308 97.5 °F (36.4 °C) 69 16 102/67 96 % 02/14/19 2340 95.5 °F (35.3 °C) 72 16 100/62   
02/14/19 1930 96.2 °F (35.7 °C) 75 16 93/59   
02/14/19 1610 97.5 °F (36.4 °C) 72 18 (!) 88/55 96 % 02/14/19 1510 97 °F (36.1 °C) 84 18 92/60 97 % 02/14/19 1350 96.4 °F (35.8 °C) 93 18 95/66 95 % 02/14/19 1323 96.7 °F (35.9 °C) 86 18 101/65   
02/14/19 1230 96.7 °F (35.9 °C) 86 18 101/65   
02/14/19 1157 97.3 °F (36.3 °C) (!) 104 16 95/62 100 % 02/14/19 1057 97.2 °F (36.2 °C) 96 16 106/58 100 % Oxygen Therapy O2 Sat (%): 96 % (02/15/19 0308) Pulse via Oximetry: 82 beats per minute (02/14/19 0612) O2 Device: Room air (02/14/19 1034) Intake/Output Summary (Last 24 hours) at 2/15/2019 1040 Last data filed at 2/15/2019 0997 Gross per 24 hour Intake 1493.5 ml Output  Net 1493.5 ml Physical Exam: 
General:    Well nourished. Alert. Loss of vision left eye Eyes:   Normal sclera. Extraocular movements intact. ENT:  Normocephalic, atraumatic. Moist mucous membranes CV:   RRR. No m/r/g. Peripheral pulses present. Capillary refill normal 
Lungs:  CTAB. No wheezing, rhonchi, or rales. Abdomen: Soft, nontender, nondistended. Bowel sounds normal.  
Extremities: Warm and dry. No cyanosis; bilateral pitting edema- increased. Sacral edema Neurologic: CN II-XII grossly intact. Sensation intact. Skin:     No rashes or jaundice. Normal coloration Psych:  Normal mood and affect. I reviewed the labs, imaging, EKGs, telemetry, and other studies done this admission. Data Review:  
Recent Results (from the past 24 hour(s)) Cheyenne Ricco Collection Time: 02/15/19  5:15 AM  
Result Value Ref Range Vancomycin,trough 8.5 5 - 20 ug/mL CBC WITH AUTOMATED DIFF Collection Time: 02/15/19  8:50 AM  
Result Value Ref Range WBC 15.5 (H) 4.3 - 11.1 K/uL  
 RBC 4.55 4.05 - 5.2 M/uL  
 HGB 12.2 11.7 - 15.4 g/dL HCT 40.4 35.8 - 46.3 % MCV 88.8 79.6 - 97.8 FL  
 MCH 26.8 26.1 - 32.9 PG  
 MCHC 30.2 (L) 31.4 - 35.0 g/dL RDW 20.4 (H) 11.9 - 14.6 % PLATELET 686 956 - 421 K/uL MPV 11.2 9.4 - 12.3 FL ABSOLUTE NRBC 0.00 0.0 - 0.2 K/uL  
 DF AUTOMATED NEUTROPHILS 83 (H) 43 - 78 % LYMPHOCYTES 10 (L) 13 - 44 % MONOCYTES 4 4.0 - 12.0 % EOSINOPHILS 2 0.5 - 7.8 % BASOPHILS 1 0.0 - 2.0 % IMMATURE GRANULOCYTES 1 0.0 - 5.0 %  
 ABS. NEUTROPHILS 12.8 (H) 1.7 - 8.2 K/UL  
 ABS. LYMPHOCYTES 1.6 0.5 - 4.6 K/UL  
 ABS. MONOCYTES 0.6 0.1 - 1.3 K/UL  
 ABS. EOSINOPHILS 0.3 0.0 - 0.8 K/UL  
 ABS. BASOPHILS 0.1 0.0 - 0.2 K/UL  
 ABS. IMM. GRANS. 0.2 0.0 - 0.5 K/UL METABOLIC PANEL, BASIC Collection Time: 02/15/19  8:50 AM  
Result Value Ref Range Sodium 144 136 - 145 mmol/L Potassium 3.6 3.5 - 5.1 mmol/L Chloride 112 (H) 98 - 107 mmol/L  
 CO2 20 (L) 21 - 32 mmol/L Anion gap 12 mmol/L Glucose 72 65 - 100 mg/dL BUN 24 (H) 8 - 23 MG/DL Creatinine 1.43 (H) 0.6 - 1.0 MG/DL  
 GFR est AA 45 (L) >60 ml/min/1.73m2 GFR est non-AA 37 ml/min/1.73m2 Calcium 7.1 (L) 8.3 - 10.4 MG/DL All Micro Results Procedure Component Value Units Date/Time CULTURE, URINE [[id number]] Collected:  02/13/19 1556 Order Status:  Completed Specimen:  Urine Updated:  02/15/19 0754 Special Requests: NO SPECIAL REQUESTS Culture result: NO GROWTH 1 DAY     
 CULTURE, BLOOD [[id number]] Collected:  02/13/19 1644 Order Status:  Completed Specimen:  Blood Updated:  02/15/19 2728 Special Requests: --     
  RIGHT JUGULAR VEIN Culture result: NO GROWTH 2 DAYS     
 CULTURE, BLOOD [[id number]] Collected:  02/13/19 1647 Order Status:  Completed Specimen:  Blood Updated:  02/15/19 0656 Special Requests: --     
  RIGHT 
ARM Culture result: NO GROWTH 2 DAYS     
 CULTURE, BLOOD [[id number]] Collected:  02/13/19 2103 Order Status:  Completed Specimen:  Blood Updated:  02/15/19 0517 Special Requests: --     
  RIGHT JUGULAR VEIN Culture result: NO GROWTH 2 DAYS     
 CULTURE, STOOL [[id number]] Collected:  02/14/19 1901 Order Status:  Completed Specimen:  Stool Updated:  02/14/19 1346 C. DIFFICILE AG & TOXIN A/B [[id number]] Collected:  02/14/19 1900 Order Status:  Completed Updated:  02/14/19 1951 C. DIFFICILE AG & TOXIN A/B [[id number]] Collected:  02/14/19 1901 Order Status:  Canceled Specimen:  Stool Updated:  02/14/19 1929 Other Studies: No results found. Assessment and Plan:  
 
Hospital Problems as of 2/15/2019 Never Reviewed Codes Class Noted - Resolved POA Pneumonia ICD-10-CM: J18.9 ICD-9-CM: 431  2/13/2019 - Present Unknown Sepsis ([facility] ICD-10-CM: A41.9 ICD-9-CM: 038.9, 995.91  2/13/2019 - Present Unknown Syncope and collapse ICD-10-CM: R55 
ICD-9-CM: 780.2  2/13/2019 - Present Yes Diarrhea ICD-10-CM: R19.7 ICD-9-CM: 787.91  2/13/2019 - Present Yes Edema ICD-10-CM: R60.9 ICD-9-CM: 782.3  2/13/2019 - Present Yes Pleural effusion ICD-10-CM: J90 ICD-9-CM: 511.9  2/13/2019 - Present Yes Atrial fibrillation with rapid ventricular response (HCC) ICD-10-CM: I48.91 
ICD-9-CM: 427.31  4/27/2018 - Present Yes Systolic CHF, acute on chronic (HCC) (Chronic) ICD-10-CM: K03.84 ICD-9-CM: 428.23, 428.0  4/27/2018 - Present Yes Solitary right kidney (Chronic) ICD-10-CM: Q60.0 ICD-9-CM: 753.0  4/27/2018 - Present Yes S/P lobectomy of lung (Chronic) ICD-10-CM: Z90.2 ICD-9-CM: V45.89  4/27/2018 - Present Yes PLAN: 
· Syncope- continue remote tele, trops, echo, request placed for old records from [facility] system · Pneumonia- continue vanc and zosyn · Sepsis- resolved · Effusion & Edema- concerning for chf vs volume overload- BNP wnl - will start lasix and albumin · Diarrhea- had an episode yesterday - cdiff is pending · Concern for uti- f/up cultures- NGTD · Hx of anemia requiring transfusions-s/p 2  Units PRBCS- hb is 12 
· Malnutrition- nutrition consult · Abdominal pain- prn pain meds · Further workup and mgt based on her clinical course · Ppd 
· Pt 
· ot · Request old records from [facility] · Pt has not walked in about 1 week- she walks with a rollator DVT ppx:  scds Anticipated DC needs:  outpt pcp, Code status:  Full Estimated LOS:  Greater than 2 midnights Risk:  high Signed: 
Tyrell Juarez MD

## 2019-02-15 NOTE — PROGRESS NOTES
Problem: Mobility Impaired (Adult and Pediatric) Goal: *Acute Goals and Plan of Care (Insert Text) STG: 
(1.)Ms. Nohemi Blevins will move from supine to sit and sit to supine  with SUPERVISION within 5 treatment day(s). (2.)Ms. Nohemi Blevins will transfer from bed to chair and chair to bed with SUPERVISION using the least restrictive device within 5 treatment day(s). (3.)Ms. Nohemi Blevins will ambulate with SUPERVISION for 200 feet with the least restrictive device within 5 treatment day(s). (4.)Pt. Will increase B LE strength 1/2 grade within 5 days 
 
 
________________________________________________________________________________________________ PHYSICAL THERAPY: Initial Assessment and AM 2/15/2019INPATIENT:   
[place] / [place] / Product Type: Medicare /   
  
NAME/AGE/GENDER: Shireen Vargas is a 80 y.o. female PRIMARY DIAGNOSIS: Pneumonia [J18.9] Sepsis ([facility] [A41.9] <principal problem not specified> <principal problem not specified> 
 
  
ICD-10: Treatment Diagnosis:  
 · Generalized Muscle Weakness (M62.81) · Other lack of cordination (R27.8) · Other abnormalities of gait and mobility (R26.89) Precaution/Allergies: 
Atenolol ASSESSMENT:  
Ms. Nohemi Blevins presents after a syncopal episode. She resides in [facility] and came to [place] to stay with her dtr for a short time while she's not been feeling well. She c/o diarrhea for the last week or so. She usually ambulates with a rollator at home and is able to complete her ADls and simple housekeeping. She has a grand son who is in/out of her apt. Her PT deficits include decreased strength and ROM, standing balance, functional mobility and endurance. She would benefit from further PT while here in the hospital. I would expect that she would be fine to return to her dtr's home and receive [facility] PT. This am, she is willing to work with me for PT.  She performs some ROM exs with her LEs. AMb a short distance to the recliner. CNA in to take vitals and lab in to get blood. Will try to use RW next visit since she is used to using rollator This section established at most recent assessment PROBLEM LIST (Impairments causing functional limitations): 1. Decreased Strength 2. Decreased ADL/Functional Activities 3. Decreased Transfer Abilities 4. Decreased Ambulation Ability/Technique 5. Decreased Balance 6. Decreased Activity Tolerance 7. Decreased Work Simplification/Energy Conservation Techniques 8. Decreased Flexibility/Joint Mobility 9. Decreased [place] with Home Exercise Program 
 INTERVENTIONS PLANNED: (Benefits and precautions of physical therapy have been discussed with the patient.) 1. Balance Exercise 2. Bed Mobility 3. Family Education 4. Gait Training 5. Home Exercise Program (HEP) 6. Range of Motion (ROM) 7. Therapeutic Activites 8. Therapeutic Exercise/Strengthening 9. Transfer Training 10. endurance activities TREATMENT PLAN: Frequency/Duration: daily for duration of hospital stay Rehabilitation Potential For Stated Goals: Fair RECOMMENDED REHABILITATION/EQUIPMENT: (at time of discharge pending progress): Due to the probability of continued deficits (see above) this patient will likely need continued skilled physical therapy after discharge. Equipment:  
? uses a rollator at home HISTORY:  
History of Present Injury/Illness (Reason for Referral): Admitted after syncopal epeisode Past Medical History/Comorbidities: Ms. Alicia Faust  has a past medical history of Arthritis, Asthma, CAD (coronary artery disease), Cancer ([facility] Chronic kidney disease, Heart failure ([facility] Hypertension, Psychiatric disorder, and PUD (peptic ulcer disease). Ms. Alicia Faust  has a past surgical history that includes hx urological; hx other surgical; and hx heent. Social History/Living Environment: Home Environment: Apartment(3rd floor apt. Uses elevator) # Steps to Enter: 0 One/Two Story Residence: One story Living Alone: Yes Support Systems: (grand son is around part of the time) Patient Expects to be Discharged to[de-identified] (uncertain whether she'll go back to stay with dtr while awaiting going back to [place]) Current DME Used/Available at Home: Johnette Severe, rollator Tub or Shower Type: Shower Prior Level of Function/Work/Activity: 
Lives in an apt in [place] and uses rollator. Performs ADLs independently Dominant Side:  
      RIGHT Number of Personal Factors/Comorbidities that affect the Plan of Care: 1-2: MODERATE COMPLEXITY EXAMINATION:  
Most Recent Physical Functioning:  
Gross Assessment: 
AROM: Generally decreased, functional(R shldr and ankle decreased) Strength: Generally decreased, functional(B UEs grossly 3+; B LEs grossly 3+ except L hip and R ankle 2+ ) Coordination: Generally decreased, functional(all 4s) Tone: Normal(all 4s) Sensation: Impaired(B hands) Posture: 
Posture Assessment: Forward head, Rounded shoulders Balance: 
Sitting: Intact Standing: With support;Pull to stand Bed Mobility: 
Supine to Sit: Minimum assistance Wheelchair Mobility: 
  
Transfers: 
Sit to Stand: Minimum assistance Stand to Sit: Contact guard assistance Bed to Chair: Contact guard assistance Gait: 
  
Base of Support: Narrowed Speed/Gemini: Shuffled;Pace decreased (<100 feet/min) Gait Abnormalities: Decreased step clearance;Shuffling gait Distance (ft): 6 Feet (ft) Assistive Device: (HHA. Will use RW next visit) Ambulation - Level of Assistance: Contact guard assistance Interventions: Safety awareness training;Verbal cues Body Structures Involved: 1. Joints 2. Muscles Body Functions Affected: 1. Movement Related Activities and Participation Affected: 1. General Tasks and Demands 2. Mobility 3. Self Care Number of elements that affect the Plan of Care: 4+: HIGH COMPLEXITY CLINICAL PRESENTATION:  
Presentation: Stable and uncomplicated: LOW COMPLEXITY CLINICAL DECISION [place] [place] [id number] AM-PAC 6 Clicks Basic Mobility Inpatient Short Form How much difficulty does the patient currently have. .. Unable A Lot A Little None 1. Turning over in bed (including adjusting bedclothes, sheets and blankets)? [] 1   [] 2   [x] 3   [] 4  
2. Sitting down on and standing up from a chair with arms ( e.g., wheelchair, bedside commode, etc.)   [] 1   [] 2   [x] 3   [] 4  
3. Moving from lying on back to sitting on the side of the bed? [] 1   [] 2   [x] 3   [] 4 How much help from another person does the patient currently need. .. Total A Lot A Little None 4. Moving to and from a bed to a chair (including a wheelchair)? [] 1   [] 2   [x] 3   [] 4  
5. Need to walk in hospital room? [] 1   [] 2   [x] 3   [] 4  
6. Climbing 3-5 steps with a railing? [] 1   [x] 2   [] 3   [] 4  
© , Trustees of [address] [place], [place] [address], under license to [employer]. All rights reserved Score:  Initial: 17 Most Recent: X (Date: -- ) Interpretation of Tool:  Represents activities that are increasingly more difficult (i.e. Bed mobility, Transfers, Gait). Medical Necessity:    
· Patient demonstrates good rehab potential due to higher previous functional level. Reason for Services/Other Comments: 
· Patient continues to require present interventions due to patient's inability to perform functional mobility independently. Use of outcome tool(s) and clinical judgement create a POC that gives a: Clear prediction of patient's progress: LOW COMPLEXITY  
  
 
 
 
TREATMENT:  
(In addition to Assessment/Re-Assessment sessions the following treatments were rendered) Pre-treatment Symptoms/Complaints:  Ready to get out of bed Pain: Initial:  
Pain Intensity 1: 0  Post Session:  0 Therapeutic Exercise: (10 Minutes):  Exercises per grid below to improve mobility, strength, coordination and endurance. Required minimal visual, verbal and tactile cues to promote proper body alignment and promote proper body breathing techniques. Progressed resistance, range, repetitions and complexity of movement as indicated. Date: 
2/15/19 Date: 
 Date: Activity/Exercise Parameters Parameters Parameters SITTING: ankle pumps 10 Hip flex 10 LAQ 10 Shldr flex 10 Elbow flex/ext 10 Assessment: 10 minutes Braces/Orthotics/Lines/Etc:  
· IV 
· O2 Device: Room air Treatment/Session Assessment:   
· Response to Treatment:  Tolerated well · Interdisciplinary Collaboration:  
o Physical Therapist 
o Certified Nursing Assistant/Patient Care Technician 
o  · After treatment position/precautions:  
o Up in chair 
o Bed alarm/tab alert on 
o Bed/Chair-wheels locked 
o Call light within reach 
o CNA and  at bedside · Compliance with Program/Exercises: Will assess as treatment progresses · Recommendations/Intent for next treatment session: \"Next visit will focus on advancements to more challenging activities and reduction in assistance provided\". Total Treatment Duration: PT Patient Time In/Time Out Time In: 0900 Time Out: 2108 Brittani Guardado, PT

## 2019-02-15 NOTE — PROGRESS NOTES
Shift Assessment - Patient is alert and oriented x3. No complaint of pain at this time. Respirations are even and unlabored. Patient experiencing dyspnea upon exertion. Patient is up in a locked chair with chair alarm. Patient needs assistance with toileting.

## 2019-02-15 NOTE — CONSULTS
[facility] CARDIOLOGY PROGRESS NOTE 
 
 
2/15/2019 12:34 PM 
 
Admit Date: 2/13/2019 Admit Diagnosis: Pneumonia [J18.9]; Sepsis ([facility] [A41.9] Subjective:  
81 yo with chronic afib on rate control recurrent anemia transfusions. NO ANTICOAGULATION PER CARDIOLOGIST AT [facility]. She is admitted after syncope in a bathroom. She was anemic requiring blood. She is on chronic lasix and also has chronic diarrhea etiology uncertain 6-7 bms watery brown a day . Also on lasix chronically Objective:  
  
Vitals:  
 02/15/19 0818 02/15/19 0844 02/15/19 1040 02/15/19 1118 BP: 104/70 105/70  104/66 Pulse: 70 98  85 Resp:  15  18 Temp:  95.1 °F (35.1 °C)  95.2 °F (35.1 °C) SpO2:      
Weight:   52.4 kg (115 lb 9.6 oz) Height:      
 
 
Physical Exam: 
Neck-  
CV- irr+r  2/6 AMADA RSB Lungs- diminished RUL  site of lobectomy Ext-mild edema Skin- warm and dry Data Review:  
Recent Labs  
  02/15/19 
0850 02/14/19 
0445  02/13/19 
1120  142  --  142  
K 3.6 3.0*  --  4.6 MG  --   --   --  1.8 BUN 24* 29*  --  29* CREA 1.43* 1.42*  --  1.64* GLU 72 89  --  80 WBC 15.5* 13.9*   < >  --   
HGB 12.2 7.2*   < >  --   
HCT 40.4 23.5*   < >  --   
 278   < >  --   
 < > = values in this interval not displayed. Assessment and Plan: Active Problems: 
  Atrial fibrillation with rapid ventricular response ([facility] (4/27/2018)   check on digoxin level  ?  contribution to diarrhea    no anticoagulation with recurrent severe iron deficiency anemia Systolic CHF, acute on chronic (HCC) (4/27/2018) check echo EF    maybe able to come off lasix as volume depletion likely etiology of repetitive syncope Solitary right kidney (4/27/2018) S/P lobectomy of lung (4/27/2018) Pneumonia (2/13/2019) Sepsis ([facility] (2/13/2019) Syncope and collapse (2/13/2019) see above  check standing blood pressures monitor afib rates Diarrhea (2/13/2019) Edema (2/13/2019) Pleural effusion (2/13/2019) Davide Jordan MD

## 2019-02-16 NOTE — PROGRESS NOTES
Shift assessment completed via doc flow sheet. Patient is alert and oriented x 4. Respirations even and unlabored on room air. Lung sounds diminished bilaterally. Heart sounds S1, S2 auscultated and regular. Abdomen soft with active bowel sounds. Rt EJ  flushed without difficulty. Patient denies pain and other needs at this time. Bed is locked and in low position. Family at bedside. Bed rails x 3. Patient is encouraged to call for assistance. Call light within reach.

## 2019-02-16 NOTE — PROGRESS NOTES
GI DAILY PROGRESS NOTE Admit Date:  2/13/2019 Today's Date:  2/16/2019 CC:  Abdominal pain; anemia; diarrhea Subjective:  
 
Patient up on the bedside commode. Had two liquid stools yesterday with undigested food particles. No nausea, vomiting or abdominal pain recently. Currently tachycardic in the 120's. Medications:  
Current Facility-Administered Medications Medication Dose Route Frequency  vancomycin (VANCOCIN) 1,000 mg in 0.9% sodium chloride (MBP/ADV) 250 mL  1,000 mg IntraVENous Q18H  
 furosemide (LASIX) injection 20 mg  20 mg IntraVENous Q12H  
 albumin human 25% (BUMINATE) solution 12.5 g  12.5 g IntraVENous Q12H  potassium chloride (K-DUR, KLOR-CON) SR tablet 20 mEq  20 mEq Oral BID  acetaminophen (TYLENOL) tablet 650 mg  650 mg Oral Q6H PRN  
 oxyCODONE IR (ROXICODONE) tablet 5 mg  5 mg Oral Q6H PRN  
 0.9% sodium chloride infusion 250 mL  250 mL IntraVENous PRN  
 dilTIAZem CD (CARDIZEM CD) capsule 120 mg  120 mg Oral DAILY  pantoprazole (PROTONIX) tablet 40 mg  40 mg Oral ACB&D  
 sodium chloride (NS) flush 5-10 mL  5-10 mL IntraVENous PRN  
 0.9% sodium chloride infusion  75 mL/hr IntraVENous CONTINUOUS  
 aspirin chewable tablet 81 mg  81 mg Oral DAILY  simvastatin (ZOCOR) tablet 10 mg  10 mg Oral QHS  piperacillin-tazobactam (ZOSYN) 4.5 g in 0.9% sodium chloride (MBP/ADV) 100 mL  4.5 g IntraVENous Q12H  
 ondansetron (ZOFRAN) injection 4 mg  4 mg IntraVENous Q4H PRN Review of Systems: 
No chest pain. Breathing is ok. Was a little SOB earlier when she got up to sit in a chair Diet:  Clear liquids Objective:  
Vitals: 
Visit Vitals BP 99/66 (BP 1 Location: Left arm, BP Patient Position: At rest;Supine) Comment: Nurse notified. Pulse (!) 126 Comment: Nurse notified. Temp 96.6 °F (35.9 °C) Resp 16 Ht 5' 5.5\" (1.664 m) Wt 52.4 kg (115 lb 9.6 oz) SpO2 98% Breastfeeding? No  
BMI 18.94 kg/m² Intake/Output: No intake/output data recorded. 02/14 1901 - 02/16 0700 In: 1230 [I.V.:1230] Out: 8611 [LLFSP:[id number]] Exam: 
General appearance: alert, cooperative, no distress; sitting on bedside commode Lungs: clear to auscultation bilaterally anteriorly Heart: tachycardic Extremities: extremities normal, atraumatic, no cyanosis or edema Neuro:  alert and oriented Data Review (Labs):   
Recent Labs  
  02/16/19 
5313 02/15/19 
0850 02/14/19 
0445 02/13/19 
1147 02/13/19 
1120 WBC 13.2* 15.5* 13.9* 16.5*  --   
HGB 12.0 12.2 7.2* 9.8*  --   
HCT 39.9 40.4 23.5* 32.1*  --   
 208 278 400  -- MCV 87.3 88.8 89.0 88.4  --   
 144 142  --  142  
K 4.5 3.6 3.0*  --  4.6 * 112* 109*  --  108* CO2 21 20* 25  --  18* BUN 22 24* 29*  --  29* CREA 1.46* 1.43* 1.42*  --  1.64* CA 7.3* 7.1* 6.5*  --  7.4* MG  --   --   --   --  1.8 GLU 62* 72 89  --  80  
AP  --   --  142*  --  199* SGOT  --   --  22  --  64* ALT  --   --  18  --  25  
TBILI  --   --  0.2  --  0.4 ALB  --   --  1.0*  --  1.3* TP  --   --  4.3  --  6.1 LPSE  --   --   --   --  39* Assessment:  
 
Active Problems: 
  Atrial fibrillation with rapid ventricular response ([facility] (4/27/2018) Systolic CHF, acute on chronic ([facility] (4/27/2018) Solitary right kidney (4/27/2018) S/P lobectomy of lung (4/27/2018) Pneumonia (2/13/2019) Sepsis ([facility] (2/13/2019) Syncope and collapse (2/13/2019) Diarrhea (2/13/2019) Edema (2/13/2019) Pleural effusion (2/13/2019) 81 y/o female with h/o CHF (EF 35-40% per recent ECHO), moderate MR, mild TR, HTN, asthma, kidney cancer s/p resection 2016, a. Fib, hx lung nodule s/p pneumonectomy admitted with syncope, diagnosed with pneumonia, elevated lactate and volume overload and seen by GI for abdominal pain, diarrhea (x 3 months) and drop in hemoglobin with normocytic anemia.  Iron studies are not consistent with iron deficiency. Stool studies  (fecal lactoferrin and culture) pending. Hgb 12. s/p 2 units PRBCs. Heme positive stool Plan:  
1) await results of stool studies 2) continue PPI po BID 3) if symptoms persist and stool studies unrevealing, will consider possible EGD and colonoscopy on Monday after her cardiopulmonary issues have improved/resolved. Currently tachycardic--a. Fib? Patient is seen and examined in collaboration with Dr. Karissa Robbins. Assessment and plan as per Dr. Jonathan Smith. TRACIE Noland  
 
ATTENDING NOTE:  I agree with the above assessment and plan. Follow up results of stool studies. Pending results and patient's clinical course, will consider prep for EGD and colonoscopy. As these procedures are not urgent, they may be alternatively arranged outpatient following discharge.  
 
Karissa Robbins MD

## 2019-02-16 NOTE — PROGRESS NOTES
[facility] CARDIOLOGY PROGRESS NOTE 
      
 
2/16/2019 2:15 PM 
 
Admit Date: 2/13/2019 Subjective:  
Still with diarrhea but better now. No CP. No SOB. Asx in Afib. ROS: 
Cardiovascular:  As noted above Objective:  
  
Vitals:  
 02/16/19 0305 02/16/19 0720 02/16/19 0840 02/16/19 1154 BP: 97/60 99/66 97/71 99/65 Pulse: 91 (!) 126 (!) 112 88 Resp: 15 16 16 16 Temp: 98 °F (36.7 °C) 96.6 °F (35.9 °C) 97.2 °F (36.2 °C) 96.6 °F (35.9 °C) SpO2:  98% Weight:      
Height:      
 
 
Physical Exam: 
General-No Acute Distress Neck- supple, no JVD 
CV- irreg irreg, 2/6 SM Lung- clear bilaterally Abd- soft, nontender, nondistended Ext- no edema bilaterally. Skin- warm and dry Data Review:  
Recent Labs  
  02/16/19 
6947 02/15/19 
0850 02/14/19 
0445  02/13/19 
2104  144 142   < >  --   
K 4.5 3.6 3.0*   < >  --   
BUN 22 24* 29*   < >  --   
CREA 1.46* 1.43* 1.42*   < >  --   
GLU 62* 72 89   < >  -- WBC 13.2* 15.5* 13.9*   < >  --   
HGB 12.0 12.2 7.2*   < >  --   
HCT 39.9 40.4 23.5*   < >  --   
 208 278   < >  --   
TROIQ  --   --  0.08*  --  0.08*  
 < > = values in this interval not displayed. Assessment/Plan: Active Problems: 
  Atrial fibrillation with rapid ventricular response ([facility] (4/27/2018) She apparently has chronic Afib, was told that she could not take [employer] [address]. Plan for rate control for now. Remain on CCB and dig for now. Can increase CCB as BP will tolerate and as needed for rate control. Remain on ASA, CVA risk known. Follow K and Mg. Systolic CHF, acute on chronic ([facility] (4/27/2018) EF 35-40% with mod MR. Med mgmt for now. No LHC needed, she has no CP. Trop noted, but this is supply demand in setting of illness. Remain on ASA. Consider changing to toprol, but she has apparently been doing well on CCB as above. On IV lasix and fluids currently. Will stop lasix today, add back when IVF stopped and diarrhea better. Follow for volume overload. Solitary right kidney (4/27/2018) Follow Cr 
 
  S/P lobectomy of lung (4/27/2018) noted Pneumonia (2/13/2019) On abx Diarrhea (2/13/2019) Check TSH, follow daily K and Mg. HPL:  Stop statin with her age and diarrhea.    
 
 
 
 
Marilyn Stewart,  
2/16/2019 2:15 PM

## 2019-02-16 NOTE — PROGRESS NOTES
Care Management Interventions PCP Verified by CM: Yes Mode of Transport at Discharge: Self Transition of Care Consult (CM Consult): Home Health [address]: Yes Discharge Durable Medical Equipment: No 
Physical Therapy Consult: Yes Occupational Therapy Consult: Yes Current Support Network: Relative's Home Confirm Follow Up Transport: Family Plan discussed with Pt/Family/Caregiver: Yes Freedom of Choice Offered: Yes Discharge Location Discharge Placement: Home with home health ELLIOTT Martini  [facility] [email or web address]

## 2019-02-16 NOTE — PROGRESS NOTES
Shift assessment  - Patient is alert and oriented x4. Respirations even and unlabored. Lung sounds diminished. Bowel sounds active x4. Rt EJ  flushed without difficulty. No complaint of pain at this time. Bed low and locked. Call light in reach.

## 2019-02-16 NOTE — PROGRESS NOTES
Hospitalist Progress Note Admit Date:  2019 11:29 AM  
Name:  Shireen Vargas Age:  80 y.o. 
:  1932 MRN:  [id number] PCP:  Constanza, Not On File Treatment Team: Attending Provider: Arturo León MD; Care Manager: Magen Brown Consulting Provider: TRACIE Meeks; Utilization Review: Hilaria Hills RN; Consulting Provider: Colonel Goldmann, MD; Utilization Review: Tomas Joseph RN As per Prior notes: \"CC: passed out in [facility] today after using the bathroom HPI:  
80yr old female with pmhx sig for chf, htn, asthma. kidney cancer s/p resection, hx of a lung nodule - not known to be cancerous but s/p pneumonectomy. The patient states she has been unwell for months, with diarrhea for months. .. Last admitted to hospital in [address] [place] [address] in December after passing out at the [facility], she was given blood, scheduled for colonoscopy but poor prep so it was not done. She passed at her home in [address] [place] [address] on Saturday- daughter went to get her and brought her [place]. ... She wanted to go to the bank today. . But needed to use the bathroom on the way. . They stopped at [facility]-- the pt had a bm and passed out at the sink while washing her hands. .. Ems - called. .. In the er cxr concerning for pneumonia; lactic acid elevated. hospitalist asked to admit\" 2019- the pt was seen - no new complaints; no further episodes of diarrhea. Receiving the 2 und of 2 units prbcs secondary to drop in hb. Awaiting old records - requested from [facility] 
 
02/15/2019- seen complains of abdominal pain- very upset about being oob to chair - wants to get back in the bed; also complains of belly pain 2019- pt seen - states hse feels stronger in her legs with less fluid on them. 10 systems reviewed and negative except as noted in HPI. Past Medical History:  
Diagnosis Date  Arthritis  Asthma  CAD (coronary artery disease)  Cancer ([facility] [address]  Chronic kidney disease   
 kidney removed  Heart failure ([facility] [address]  Hypertension  Psychiatric disorder   
 depresion  PUD (peptic ulcer disease)   
 acid reflux Past Surgical History:  
Procedure Laterality Date  HX HEENT    
 eye surgery  HX OTHER SURGICAL    
 lung  HX UROLOGICAL Allergies Allergen Reactions  Atenolol Swelling Social History Tobacco Use  Smoking status: Former Smoker  Smokeless tobacco: Never Used Substance Use Topics  Alcohol use: No  
  
History reviewed. No pertinent family history. Immunization History Administered Date(s) Administered  TB Skin Test (PPD) Intradermal 04/27/2018 PTA Medications: 
Prior to Admission Medications Prescriptions Last Dose Informant Patient Reported? Taking?  
aspirin 81 mg chewable tablet   Yes Yes Sig: Take 81 mg by mouth daily. calcium carbonate (TUMS) 200 mg calcium (500 mg) chew   Yes Yes Sig: Take 1 Tab by mouth every six (6) hours as needed. Indications: Heartburn  
dilTIAZem (CARDIZEM) 120 mg tablet   Yes Yes Sig: Take 120 mg by mouth daily. ferrous sulfate 325 mg (65 mg iron) tablet   No Yes Sig: Take 1 Tab by mouth daily (with breakfast). furosemide (LASIX) 40 mg tablet   Yes Yes Sig: Take  by mouth daily. lisinopril (PRINIVIL, ZESTRIL) 5 mg tablet   Yes Yes Sig: Take 5 mg by mouth daily. loperamide (IMMODIUM) 2 mg tablet   Yes Yes Sig: Take 2 mg by mouth four (4) times daily as needed for Diarrhea. Indications: Diarrhea  
omeprazole (PRILOSEC) 40 mg capsule   Yes Yes Sig: Take 40 mg by mouth daily. simvastatin (ZOCOR) 20 mg tablet   Yes Yes Sig: Take 20 mg by mouth nightly. Facility-Administered Medications: None Objective:  
 
Patient Vitals for the past 24 hrs: 
 Temp Pulse Resp BP SpO2  
02/16/19 1154 96.6 °F (35.9 °C) 88 16 99/65   
02/16/19 0840 97.2 °F (36.2 °C) (!) 112 16 97/71   
02/16/19 0720 96.6 °F (35.9 °C) (!) 126 16 99/66 98 % 02/16/19 0305 98 °F (36.7 °C) 91 15 97/60   
02/16/19 0037 97.4 °F (36.3 °C) (!) 123 16 103/62   
02/15/19 1955  90 16 98/65   
02/15/19 1946 95.5 °F (35.3 °C) (!) 102  (!) 75/63   
02/15/19 1612     100 % 02/15/19 1528 96.3 °F (35.7 °C)  17 106/73  Oxygen Therapy O2 Sat (%): 98 % (02/16/19 0720) Pulse via Oximetry: 82 beats per minute (02/14/19 0612) O2 Device: Room air (02/14/19 1034) Intake/Output Summary (Last 24 hours) at 2/16/2019 1249 Last data filed at 2/16/2019 3983 Gross per 24 hour Intake 499 ml Output 1400 ml Net -901 ml Physical Exam: 
General:    Well nourished. Alert. Loss of vision left eye Eyes:   Normal sclera. Extraocular movements intact. ENT:  Normocephalic, atraumatic. Moist mucous membranes CV:   RRR. No m/r/g. Peripheral pulses present. Capillary refill normal 
Lungs:  CTAB. No wheezing, rhonchi, or rales. Abdomen: Soft, nontender, nondistended. Bowel sounds normal.  
Extremities: Warm and dry. No cyanosis; bilateral pitting edema- increased. Sacral edema Neurologic: CN II-XII grossly intact. Sensation intact. Skin:     No rashes or jaundice. Normal coloration Psych:  Normal mood and affect. I reviewed the labs, imaging, EKGs, telemetry, and other studies done this admission. Data Review:  
Recent Results (from the past 24 hour(s)) CBC WITH AUTOMATED DIFF Collection Time: 02/16/19  6:44 AM  
Result Value Ref Range WBC 13.2 (H) 4.3 - 11.1 K/uL  
 RBC 4.57 4.05 - 5.2 M/uL  
 HGB 12.0 11.7 - 15.4 g/dL HCT 39.9 35.8 - 46.3 % MCV 87.3 79.6 - 97.8 FL  
 MCH 26.3 26.1 - 32.9 PG  
 MCHC 30.1 (L) 31.4 - 35.0 g/dL RDW 20.3 (H) 11.9 - 14.6 % PLATELET 393 807 - 607 K/uL MPV 9.7 9.4 - 12.3 FL ABSOLUTE NRBC 0.00 0.0 - 0.2 K/uL  
 DF AUTOMATED NEUTROPHILS 82 (H) 43 - 78 % LYMPHOCYTES 11 (L) 13 - 44 % MONOCYTES 4 4.0 - 12.0 % EOSINOPHILS 2 0.5 - 7.8 %  BASOPHILS 0 0.0 - 2.0 %  
 IMMATURE GRANULOCYTES 1 0.0 - 5.0 %  
 ABS. NEUTROPHILS 10.8 (H) 1.7 - 8.2 K/UL  
 ABS. LYMPHOCYTES 1.4 0.5 - 4.6 K/UL  
 ABS. MONOCYTES 0.6 0.1 - 1.3 K/UL  
 ABS. EOSINOPHILS 0.2 0.0 - 0.8 K/UL  
 ABS. BASOPHILS 0.1 0.0 - 0.2 K/UL  
 ABS. IMM. GRANS. 0.1 0.0 - 0.5 K/UL METABOLIC PANEL, BASIC Collection Time: 02/16/19  6:44 AM  
Result Value Ref Range Sodium 143 136 - 145 mmol/L Potassium 4.5 3.5 - 5.1 mmol/L Chloride 112 (H) 98 - 107 mmol/L  
 CO2 21 21 - 32 mmol/L Anion gap 10 mmol/L Glucose 62 (L) 65 - 100 mg/dL BUN 22 8 - 23 MG/DL Creatinine 1.46 (H) 0.6 - 1.0 MG/DL  
 GFR est AA 44 (L) >60 ml/min/1.73m2 GFR est non-AA 36 ml/min/1.73m2 Calcium 7.3 (L) 8.3 - 10.4 MG/DL All Micro Results Procedure Component Value Units Date/Time Ovidio Severance [[id number]] Collected:  02/14/19 1901 Order Status:  Completed Specimen:  Stool Updated:  02/16/19 1058 Special Requests: NO SPECIAL REQUESTS Culture result:    
  NO GROWTH OF SALMONELLA OR SHIGELLA IS EVIDENT ON FIRST READING, FINAL REPORT TO FOLLOW AFTER FURTHER INCUBATION OF CULTURE  
     
 CULTURE, BLOOD [[id number]] Collected:  02/13/19 1644 Order Status:  Completed Specimen:  Blood Updated:  02/16/19 0060 Special Requests: --     
  RIGHT JUGULAR VEIN Culture result: NO GROWTH 3 DAYS     
 CULTURE, BLOOD [[id number]] Collected:  02/13/19 1647 Order Status:  Completed Specimen:  Blood Updated:  02/16/19 2562 Special Requests: --     
  RIGHT 
ARM Culture result: NO GROWTH 3 DAYS     
 CULTURE, BLOOD [[id number]] Collected:  02/13/19 2103 Order Status:  Completed Specimen:  Blood Updated:  02/16/19 6028 Special Requests: --     
  RIGHT JUGULAR VEIN Culture result: NO GROWTH 3 DAYS     
 CULTURE, URINE [[id number]] Collected:  02/13/19 1556 Order Status:  Completed Specimen:  Urine Updated:  02/16/19 7966 Special Requests: NO SPECIAL REQUESTS Culture result: NO GROWTH 2 DAYS C. DIFFICILE AG & TOXIN A/B [[id number]] Collected:  02/14/19 1900 Order Status:  Completed Specimen:  Stool Updated:  02/15/19 1427 [address] ANTIGEN    
  C. DIFFICILE GDH ANTIGEN-NEGATIVE  
     
  C. difficile toxin C. DIFFICILE TOXIN-NEGATIVE  
     
  PCR Reflex NOT APPLICABLE INTERPRETATION    
  NEGATIVE FOR TOXIGENIC C. DIFFICILE Clinical Consideration NEGATIVE FOR TOXIGENIC C. DIFFICILE  
     
 C. DIFFICILE AG & TOXIN A/B [[id number]] Collected:  02/14/19 1901 Order Status:  Canceled Specimen:  Stool Updated:  02/14/19 1929 Other Studies: No results found. Assessment and Plan:  
 
Hospital Problems as of 2/16/2019 Never Reviewed Codes Class Noted - Resolved POA Pneumonia ICD-10-CM: J18.9 ICD-9-CM: 188  2/13/2019 - Present Unknown Sepsis ([facility] [address] ICD-10-CM: A41.9 ICD-9-CM: 038.9, 995.91  2/13/2019 - Present Unknown Syncope and collapse ICD-10-CM: R55 
ICD-9-CM: 780.2  2/13/2019 - Present Yes Diarrhea ICD-10-CM: R19.7 ICD-9-CM: 787.91  2/13/2019 - Present Yes Edema ICD-10-CM: R60.9 ICD-9-CM: 782.3  2/13/2019 - Present Yes Pleural effusion ICD-10-CM: J90 ICD-9-CM: 511.9  2/13/2019 - Present Yes Atrial fibrillation with rapid ventricular response (HCC) ICD-10-CM: I48.91 
ICD-9-CM: 427.31  4/27/2018 - Present Yes Systolic CHF, acute on chronic (HCC) (Chronic) ICD-10-CM: A05.28 ICD-9-CM: 428.23, 428.0  4/27/2018 - Present Yes Solitary right kidney (Chronic) ICD-10-CM: Q60.0 ICD-9-CM: 753.0  4/27/2018 - Present Yes S/P lobectomy of lung (Chronic) ICD-10-CM: Z90.2 ICD-9-CM: V45.89  4/27/2018 - Present Yes PLAN: 
· Syncope- continue remote tele, trops, echo, request placed for old records from [facility] · Pneumonia- continue vanc and zosyn · Sepsis- resolved · Effusion & Edema- concerning for chf vs volume overload- BNP wnl - will start lasix and albumin · Diarrhea- had an episode yesterday - cdiff is pending · Concern for uti- f/up cultures- NGTD · Hx of anemia requiring transfusions-s/p 2  Units PRBCS- hb is 12 
· Malnutrition- nutrition consult · Abdominal pain- prn pain meds · Further workup and mgt based on her clinical course · Ppd 
· Pt 
· ot · Request old records from [facility] · Pt has not walked in about 1 week- she walks with a rollator DVT ppx:  scds Anticipated DC needs:  outpt pcp, Code status:  Full Estimated LOS:  Greater than 2 midnights Risk:  high Signed: 
Kolby Leal MD

## 2019-02-16 NOTE — PROGRESS NOTES
Physical therapy note: checked on pt for PT and she states that her diarrhea starts whenever she gets up and move, she politely declined. Therapy will check on her tomorrow. Thanks.  Bianca Baig, PT

## 2019-02-16 NOTE — PROGRESS NOTES
Problem: Nutrition Deficit Goal: *Optimize nutritional status Nutrition Follow Up: 
Reason for initial assessment: Consult received for General Nutrition Management and Supplements 2/15: Dr. Ashia Ferguson Referral received from nursing admission Malnutrition Screening Tool for recently lost 14-23# without trying and eating poorly due to chewing problems. Assessment:  
Diet order(s): Clear Liquids Pt presents with syncope. Past medical history notable for AFib, CHF, HTN, CKD-stage 3, kidney cancer s/p resection, pneumonectomy, chronic diarrhea past few months with 6-7 loose stools/day; pt on chronic lasix. Pt was sitting on the bedside commode at time of my visit-states her diarrhea is improving. Pt endorses tolerating clear liquids; requests no beef flavored broth; has been drinking the Ensure Clear sent on all meal trays. Anthropometrics:Height: 5' 5.5\" (166.4 cm),  Weight: 52.4 kg (115 lb 9.6 oz), Weight Source: Bed, Body mass index is 18.94 kg/m². BMI class of underweight for Older American Women. Macronutrient needs: EER:  1224-4330 kcal /day (30-35 kcal/kg BW) EPR:  47-52 grams protein/day (0.9-1 grams/kg BW) GFR 45 Intake/Comparative Standards:  Clear liquid diet does not meet estimated kcal or protein needs. Nutrition Diagnosis: Inadequate oral intake r/t dietary restrictions as evidenced by clear liquid diet. 
  
Intervention: 
Meals and snacks: Continue current diet. Advance per MD recommendations as medical condition dictates. Explained to patient that she may need to be on clear liquids pending GI recommendations. Noted food preferences while on Clears. Nutrition Supplement Therapy: Initiated Ensure Clear all meals while on clear liquids Discharge nutrition plan: Too soon to determine.  
Coordination of Nutrition Care:  Interdisciplinary Rounds, Dr. Cassius Kenyon, MPH, [address] [place] [address],  
[phone]

## 2019-02-16 NOTE — PROGRESS NOTES
Charge Nurse Puja notified of MEWS 4. Patient has been up to [facility] and is now resting in bed. Family at bedside.

## 2019-02-16 NOTE — PROGRESS NOTES
Problem: Nutrition Deficit Goal: *Optimize nutritional status Nutrition Reason for assessment: Consult received for General Nutrition Management and Supplements 2/15: Dr. Dari Chicas Referral received from nursing admission Malnutrition Screening Tool for recently lost 14-23# without trying and eating poorly due to chewing problems. Assessment:  
Diet order(s): Clear Liquids Pt presents with syncope. Past medical history notable for AFib, CHF, HTN, CKD-stage 3, kidney cancer s/p resection, pneumonectomy, chronic diarrhea past few months with 6-7 loose stools/day; pt on chronic lasix. Food/Nutrition Patient History:  Pt lives in [place]; grandson lives with her and assists with meal preparation. Pt often eats convenience foods such as Pot Pies that she can microwave. Pt endorses weight loss r/t no appetite. Pt reports nausea today, no emesis and no diarrhea since before breakfast; was hoping for diet advancement. Anthropometrics:Height: 5' 5.5\" (166.4 cm),  Weight: 52.4 kg (115 lb 9.6 oz), Weight Source: Bed, Body mass index is 18.94 kg/m². BMI class of underweight for Older American Women. Macronutrient needs: EER:  4878-7564 kcal /day (30-35 kcal/kg BW) EPR:  47-52 grams protein/day (0.9-1 grams/kg BW) GFR 45 Intake/Comparative Standards:  Clear liquid diet does not meet estimated kcal or protein needs. Nutrition Diagnosis: Inadequate oral intake r/t dietary restrictions as evidenced by clear liquid diet. Intervention: 
Meals and snacks: Continue current diet. Advance per MD recommendations as medical condition dictates. Nutrition Supplement Therapy: Initiated Ensure Clear all meals while on clear liquids Discharge nutrition plan: Too soon to determine. Coordination of Nutrition Care:  Interdisciplinary Rounds Sumaya Thayer, MPH, [address] [place] [address],  
[phone]

## 2019-02-17 NOTE — PROGRESS NOTES
GI DAILY PROGRESS NOTE Admit Date:  2/13/2019 Today's Date:  2/17/2019 CC:  Abdominal pain; anemia; diarrhea Subjective:  
 
Patient lying in bed. No family at bedside. Diarrhea improved--she reports two loose stools yesterday and one this am.  No report of blood. Abdominal pain, nausea and vomiting have resolved. She is still requesting inpatient EGD/colo because of her 20 pound weight loss over the last few months and intermittent abdominal pain, vomiting and diarrhea. Medications:  
Current Facility-Administered Medications Medication Dose Route Frequency  digoxin (LANOXIN) tablet 0.125 mg  0.125 mg Oral DAILY  albumin human 25% (BUMINATE) solution 12.5 g  12.5 g IntraVENous Q12H  
 acetaminophen (TYLENOL) tablet 650 mg  650 mg Oral Q6H PRN  
 oxyCODONE IR (ROXICODONE) tablet 5 mg  5 mg Oral Q6H PRN  
 0.9% sodium chloride infusion 250 mL  250 mL IntraVENous PRN  
 dilTIAZem CD (CARDIZEM CD) capsule 120 mg  120 mg Oral DAILY  pantoprazole (PROTONIX) tablet 40 mg  40 mg Oral ACB&D  
 sodium chloride (NS) flush 5-10 mL  5-10 mL IntraVENous PRN  
 0.9% sodium chloride infusion  75 mL/hr IntraVENous CONTINUOUS  
 aspirin chewable tablet 81 mg  81 mg Oral DAILY  piperacillin-tazobactam (ZOSYN) 4.5 g in 0.9% sodium chloride (MBP/ADV) 100 mL  4.5 g IntraVENous Q12H  
 ondansetron (ZOFRAN) injection 4 mg  4 mg IntraVENous Q4H PRN Review of Systems: 
No chest pain. No SOB Diet:  Clear liquids Objective:  
Vitals: 
Visit Vitals /73 (BP 1 Location: Right arm, BP Patient Position: At rest;Supine) Pulse 87 Temp 95.7 °F (35.4 °C) Resp 17 Ht 5' 5.5\" (1.664 m) Wt 52.6 kg (116 lb) SpO2 91% Breastfeeding? No  
BMI 19.01 kg/m² Intake/Output: 
02/17 0701 - 02/17 1900 In: -  
Out: 200 [Urine:200] 02/15 1901 - 02/17 0700 In: 499 [I.V.:499] Out: 2150 [Urine:2150] Exam: 
General appearance: alert, cooperative, no distress Lungs: clear to auscultation bilaterally anteriorly Heart:irregularly irregular with murmur Extremities: extremities normal, atraumatic, no cyanosis or edema Neuro:  alert and oriented Data Review (Labs):   
Recent Labs  
  02/17/19 
0550 02/16/19 
1884 02/15/19 
8205 WBC 12.8* 13.2* 15.5* HGB 10.4* 12.0 12.2 HCT 33.7* 39.9 40.4  242 208 MCV 86.2 87.3 88.8  143 144  
K 4.0 4.5 3.6 * 112* 112* CO2 22 21 20* BUN 17 22 24* CREA 1.42* 1.46* 1.43* CA 7.3* 7.3* 7.1*  
MG 1.3*  --   --   
GLU 64* 62* 72 Assessment:  
 
Principal Problem: 
  Syncope and collapse (2/13/2019) Active Problems: 
  Atrial fibrillation with rapid ventricular response ([address] (4/27/2018) Systolic CHF, acute on chronic ([address] (4/27/2018) Solitary right kidney (4/27/2018) S/P lobectomy of lung (4/27/2018) Pneumonia (2/13/2019) Sepsis ([address] (2/13/2019) Diarrhea (2/13/2019) Edema (2/13/2019) Pleural effusion (2/13/2019) 81 y/o female with h/o CHF (EF 35-40% per recent ECHO), moderate MR, mild TR, HTN, asthma, kidney cancer s/p resection 2016, a. Fib, hx lung nodule s/p pneumonectomy admitted with syncope, diagnosed with pneumonia, elevated lactate and volume overload and seen by GI for abdominal pain, diarrhea (x 3 months), weight loss and drop in hemoglobin with normocytic anemia. Iron studies are not consistent with iron deficiency. Stool culture and c. Diff negative. Fecal lactoferrin pending. Hgb 10.4. s/p 2 units PRBCs. Heme positive stool Plan:  
1) will check UGI series (family reports being told in [facility] that her esophagus was displaced secondary to prior pneumonectomy and EGD would be too high risk) 2) continue PPI 3) await results of fecal lactoferrin 4) will be seen by Dr. Sharon Jones tomorrow. She can determine whether patient is appropriate for prep tomorrow and EGD/colo on Tuesday.   Again, patient and family prefer inpatient GI work-up Patient is seen and examined in collaboration with Dr. John Zee. Assessment and plan as per Dr. Elio Colon. TRACIE [place]  
 
ATTENDING NOTE:  I agree with the above assessment and plan. Will check upper GI series to assess postoperative anatomy. Follow up additional stool studies.   
 
John Zee MD

## 2019-02-17 NOTE — PROGRESS NOTES
Hospitalist Progress Note Admit Date:  2019 11:29 AM  
Name:  Agustina Berman Age:  80 y.o. 
:  1932 MRN:  [id number] PCP:  Constanza, Not On File Treatment Team: Attending Provider: Miguelina Decker MD; Care Manager: Nelson Albert Consulting Provider: TRACIE Galindo; Utilization Review: Ann Sands RN; Consulting Provider: Christina Gold MD; Utilization Review: Mera Mayfield RN As per Prior notes: \"CC: passed out in [facility] today after using the bathroom HPI:  
80yr old female with pmhx sig for chf, htn, asthma. kidney cancer s/p resection, hx of a lung nodule - not known to be cancerous but s/p pneumonectomy. The patient states she has been unwell for months, with diarrhea for months. .. Last admitted to hospital in [address] [place] [address] in December after passing out at the [facility], she was given blood, scheduled for colonoscopy but poor prep so it was not done. She passed at her home in [address] [place] [address] on Saturday- daughter went to get her and brought her [place]. ... She wanted to go to the bank today. . But needed to use the bathroom on the way. . They stopped at [facility]-- the pt had a bm and passed out at the sink while washing her hands. .. Ems - called. .. In the er cxr concerning for pneumonia; lactic acid elevated. hospitalist asked to admit\" The patient was admitted. She was started on abx for the pneumonia. She was also transfused 2 units of PRBCS. She was seen by GI with plans for egd and colonoscopy once stable from a cardiology and resp point of view. Cardiology was consulted automatically in the admitting order set and have signed off. She has felt improved with diuresis and the blood. Her major concern continues the to be the diarrhea of unclear etiology. Cdiff is negative. She has no evidence of dig toxicity. She is allergic to BB with a history of swelling so these were not started.  Plan si for egd this week with gi- they have not decided on imaging for her altered anatomy or not at this time. 02/16/2019- pt seen - states she feels stronger in her legs with less fluid on them. 02/17/2019- pt seen - states she feels better in her legs- wants to go ahead with egd and colonoscopy if possible. .. She never had any problems with this diarrhea until the last year or so. .. 10 systems reviewed and negative except as noted in HPI. Past Medical History:  
Diagnosis Date  Arthritis  Asthma  CAD (coronary artery disease)  Cancer ([facility]  Chronic kidney disease   
 kidney removed  Heart failure ([facility]  Hypertension  Psychiatric disorder   
 depresion  PUD (peptic ulcer disease)   
 acid reflux Past Surgical History:  
Procedure Laterality Date  HX HEENT    
 eye surgery  HX OTHER SURGICAL    
 lung  HX UROLOGICAL Allergies Allergen Reactions  Atenolol Swelling Social History Tobacco Use  Smoking status: Former Smoker  Smokeless tobacco: Never Used Substance Use Topics  Alcohol use: No  
  
History reviewed. No pertinent family history. Immunization History Administered Date(s) Administered  TB Skin Test (PPD) Intradermal 04/27/2018 PTA Medications: 
Prior to Admission Medications Prescriptions Last Dose Informant Patient Reported? Taking?  
aspirin 81 mg chewable tablet   Yes Yes Sig: Take 81 mg by mouth daily. calcium carbonate (TUMS) 200 mg calcium (500 mg) chew   Yes Yes Sig: Take 1 Tab by mouth every six (6) hours as needed. Indications: Heartburn  
dilTIAZem (CARDIZEM) 120 mg tablet   Yes Yes Sig: Take 120 mg by mouth daily. ferrous sulfate 325 mg (65 mg iron) tablet   No Yes Sig: Take 1 Tab by mouth daily (with breakfast). furosemide (LASIX) 40 mg tablet   Yes Yes Sig: Take  by mouth daily. lisinopril (PRINIVIL, ZESTRIL) 5 mg tablet   Yes Yes Sig: Take 5 mg by mouth daily. loperamide (IMMODIUM) 2 mg tablet   Yes Yes Sig: Take 2 mg by mouth four (4) times daily as needed for Diarrhea. Indications: Diarrhea  
omeprazole (PRILOSEC) 40 mg capsule   Yes Yes Sig: Take 40 mg by mouth daily. simvastatin (ZOCOR) 20 mg tablet   Yes Yes Sig: Take 20 mg by mouth nightly. Facility-Administered Medications: None Objective:  
 
Patient Vitals for the past 24 hrs: 
 Temp Pulse Resp BP SpO2  
02/17/19 0835 95.7 °F (35.4 °C) 87 17 106/73 91 % 02/17/19 0411 97.3 °F (36.3 °C) 70 16 111/67 95 % 02/16/19 2345 95.5 °F (35.3 °C) (!) 109 18 100/77 95 % 02/16/19 1913 96.1 °F (35.6 °C) 81 16 98/58 96 % 02/16/19 1514 96.2 °F (35.7 °C) 72 16 (!) 89/57 95 % 02/16/19 1154 96.6 °F (35.9 °C) 88 16 99/65  Oxygen Therapy O2 Sat (%): 91 % (02/17/19 0835) Pulse via Oximetry: 82 beats per minute (02/14/19 0612) O2 Device: Room air (02/17/19 0742) Intake/Output Summary (Last 24 hours) at 2/17/2019 1057 Last data filed at 2/17/2019 0384 Gross per 24 hour Intake  Output 950 ml Net -950 ml Physical Exam: 
General:    Well nourished. Alert. Loss of vision left eye Eyes:   Normal sclera. Extraocular movements intact. ENT:  Normocephalic, atraumatic. Moist mucous membranes CV:   RRR. No m/r/g. Peripheral pulses present. Capillary refill normal 
Lungs:  CTAB. No wheezing, rhonchi, or rales. Abdomen: Soft, nontender, nondistended. Bowel sounds normal.  
Extremities: Warm and dry. No cyanosis; bilateral pitting edema- increased. Sacral edema Neurologic: CN II-XII grossly intact. Sensation intact. Skin:     No rashes or jaundice. Normal coloration Psych:  Normal mood and affect. I reviewed the labs, imaging, EKGs, telemetry, and other studies done this admission. Data Review:  
Recent Results (from the past 24 hour(s)) CBC WITH AUTOMATED DIFF Collection Time: 02/17/19  5:50 AM  
Result Value Ref Range WBC 12.8 (H) 4.3 - 11.1 K/uL RBC 3.91 (L) 4.05 - 5.2 M/uL  
 HGB 10.4 (L) 11.7 - 15.4 g/dL HCT 33.7 (L) 35.8 - 46.3 % MCV 86.2 79.6 - 97.8 FL  
 MCH 26.6 26.1 - 32.9 PG  
 MCHC 30.9 (L) 31.4 - 35.0 g/dL  
 RDW 19.9 (H) 11.9 - 14.6 % PLATELET 688 642 - 019 K/uL MPV 10.5 9.4 - 12.3 FL ABSOLUTE NRBC 0.00 0.0 - 0.2 K/uL  
 DF AUTOMATED NEUTROPHILS 79 (H) 43 - 78 % LYMPHOCYTES 11 (L) 13 - 44 % MONOCYTES 5 4.0 - 12.0 % EOSINOPHILS 3 0.5 - 7.8 % BASOPHILS 0 0.0 - 2.0 % IMMATURE GRANULOCYTES 1 0.0 - 5.0 %  
 ABS. NEUTROPHILS 10.1 (H) 1.7 - 8.2 K/UL  
 ABS. LYMPHOCYTES 1.5 0.5 - 4.6 K/UL  
 ABS. MONOCYTES 0.7 0.1 - 1.3 K/UL  
 ABS. EOSINOPHILS 0.4 0.0 - 0.8 K/UL  
 ABS. BASOPHILS 0.0 0.0 - 0.2 K/UL  
 ABS. IMM. GRANS. 0.2 0.0 - 0.5 K/UL METABOLIC PANEL, BASIC Collection Time: 02/17/19  5:50 AM  
Result Value Ref Range Sodium 142 136 - 145 mmol/L Potassium 4.0 3.5 - 5.1 mmol/L Chloride 112 (H) 98 - 107 mmol/L  
 CO2 22 21 - 32 mmol/L Anion gap 8 mmol/L Glucose 64 (L) 65 - 100 mg/dL BUN 17 8 - 23 MG/DL Creatinine 1.42 (H) 0.6 - 1.0 MG/DL  
 GFR est AA 45 (L) >60 ml/min/1.73m2 GFR est non-AA 37 ml/min/1.73m2 Calcium 7.3 (L) 8.3 - 10.4 MG/DL MAGNESIUM Collection Time: 02/17/19  5:50 AM  
Result Value Ref Range Magnesium 1.3 (LL) 1.8 - 2.4 mg/dL All Micro Results Procedure Component Value Units Date/Time Susy Horowitz [[id number]] Collected:  02/14/19 1901 Order Status:  Completed Specimen:  Stool Updated:  02/17/19 3290 Special Requests: NO SPECIAL REQUESTS Culture result:    
  No Salmonella, Shigella, or Ecoli 0157 isolated. CULTURE, BLOOD [[id number]] Collected:  02/13/19 1644 Order Status:  Completed Specimen:  Blood Updated:  02/16/19 0941 Special Requests: --     
  RIGHT JUGULAR VEIN Culture result: NO GROWTH 3 DAYS     
 CULTURE, BLOOD [[id number]] Collected:  02/13/19 1647 Order Status:  Completed Specimen:  Blood Updated:  02/16/19 8994 Special Requests: --     
  RIGHT 
ARM Culture result: NO GROWTH 3 DAYS     
 CULTURE, BLOOD [[id number]] Collected:  02/13/19 2103 Order Status:  Completed Specimen:  Blood Updated:  02/16/19 8516 Special Requests: --     
  RIGHT JUGULAR VEIN Culture result: NO GROWTH 3 DAYS     
 CULTURE, URINE [[id number]] Collected:  02/13/19 1556 Order Status:  Completed Specimen:  Urine Updated:  02/16/19 3052 Special Requests: NO SPECIAL REQUESTS Culture result: NO GROWTH 2 DAYS C. DIFFICILE AG & TOXIN A/B [[id number]] Collected:  02/14/19 1900 Order Status:  Completed Specimen:  Stool Updated:  02/15/19 1424 [id number] ANTIGEN    
  C. DIFFICILE GDH ANTIGEN-NEGATIVE  
     
  C. difficile toxin C. DIFFICILE TOXIN-NEGATIVE  
     
  PCR Reflex NOT APPLICABLE INTERPRETATION    
  NEGATIVE FOR TOXIGENIC C. DIFFICILE Clinical Consideration NEGATIVE FOR TOXIGENIC C. DIFFICILE  
     
 C. DIFFICILE AG & TOXIN A/B [[id number]] Collected:  02/14/19 1901 Order Status:  Canceled Specimen:  Stool Updated:  02/14/19 1929 Other Studies: No results found. Assessment and Plan:  
 
Hospital Problems as of 2/17/2019 Never Reviewed Codes Class Noted - Resolved POA Pneumonia ICD-10-CM: J18.9 ICD-9-CM: 927  2/13/2019 - Present Unknown Sepsis ([facility] ICD-10-CM: A41.9 ICD-9-CM: 038.9, 995.91  2/13/2019 - Present Unknown * (Principal) Syncope and collapse ICD-10-CM: R55 
ICD-9-CM: 780.2  2/13/2019 - Present Yes Diarrhea ICD-10-CM: R19.7 ICD-9-CM: 787.91  2/13/2019 - Present Yes Edema ICD-10-CM: R60.9 ICD-9-CM: 782.3  2/13/2019 - Present Yes Pleural effusion ICD-10-CM: J90 ICD-9-CM: 511.9  2/13/2019 - Present Yes Atrial fibrillation with rapid ventricular response (HCC) ICD-10-CM: I48.91 
ICD-9-CM: 427.31  4/27/2018 - Present Yes Systolic CHF, acute on chronic (HCC) (Chronic) ICD-10-CM: V20.95 ICD-9-CM: 428.23, 428.0  4/27/2018 - Present Yes Solitary right kidney (Chronic) ICD-10-CM: Q60.0 ICD-9-CM: 753.0  4/27/2018 - Present Yes S/P lobectomy of lung (Chronic) ICD-10-CM: Z90.2 ICD-9-CM: V45.89  4/27/2018 - Present Yes PLAN ( remains unchanged except as outlined below): · Syncope- likely secondary to anemia-  request placed for old records from [facility]- nothing has come yet - will have  follow up keon · Pneumonia- cultures ngtd- continue with zosyn; vanc stopped. · Sepsis- resolved · Effusion & Edema- multifactorial from chf and severe protein malnutrition- iv lasix on hold per cardiology- back on  ivfls- will decrease to 25ml/hr. monitor for intravascular volume and 3rd spacing; stop albumin for now · afib with rvr- currently on CCB and dig- bb not started secondary to hx of swelling with atenolol. . currently rate controlled - will monitor · Diarrhea- has been ongoing since admit and also today  - cdiff is negative & dig level is wnl so not likely due to dig toxicity-  occult blood is positive- she is on a clear liquid diet until final decision with regards to scope made- hopefully will be done per gi likely on Tuesday. · Concern for uti- cultures NGTD · Hx of anemia requiring transfusions-s/p 2  Units PRBCS- hb is down from 12 to 10.4- could be dilutional from ivfls or secondary to bleed will monitor · Malnutrition- nutrition consulted- and following · Abdominal pain- prn pain meds- no pain today · Further workup and mgt based on her clinical course · Ppd 
· Pt 
· ot · Pt had not walked in about 1 week prior to presentation- she walks with a rollator- currently working with PT 
 
DVT ppx:  scds Anticipated DC needs:  outpt pcp, goal is discharge back to home with her family when medically stable likely after edg and colonoscopy. Code status:  Full Estimated LOS:  Unclear at the moment Risk:  high Signed: 
Zay Hinson MD

## 2019-02-17 NOTE — PROGRESS NOTES
See my note from yesterday. We are here as needed, will defer to Hospitalist for ongoing mgmt. Please call us as needed. Patient can follow up in our office 1-2 weeks after d/c. Thanks. Kesha Mansfield

## 2019-02-17 NOTE — PROGRESS NOTES
Shift assessment completed. Pt alert and oriented x4. Lungs CTA with even and unlabored respirations. Heart sounds regular. Active bowel sounds with soft and non-tender abdomen. Skin warm and dry. IV c/d and infusing. Pt reports headache pain, see separate note and MAR for medication. Assisted patient to bedside commode for 1 urine and 1 stool occurrence. Loose brown stool with red streaks noted. Returned to locked bed in lowest position with call light in reach.

## 2019-02-17 NOTE — PROGRESS NOTES
Problem: Mobility Impaired (Adult and Pediatric) Goal: *Acute Goals and Plan of Care (Insert Text) STG: 
(1.)Ms. Valli Boas will move from supine to sit and sit to supine  with SUPERVISION within 5 treatment day(s). (2.)Ms. Valli Boas will transfer from bed to chair and chair to bed with SUPERVISION using the least restrictive device within 5 treatment day(s). (3.)Ms. Valli Boas will ambulate with SUPERVISION for 200 feet with the least restrictive device within 5 treatment day(s). (4.)Pt. Will increase B LE strength 1/2 grade within 5 days 
 
 
________________________________________________________________________________________________ PHYSICAL THERAPY: Daily Note and AM 2/17/2019INPATIENT: PT Visit Days : 2 [place] / [place] / Product Type: Medicare /   
  
NAME/AGE/GENDER: Bharati Lechuga is a 80 y.o. female PRIMARY DIAGNOSIS: Pneumonia [J18.9] [facility] ([facility] [A41.9] Syncope and collapse Syncope and collapse ICD-10: Treatment Diagnosis:  
 · Generalized Muscle Weakness (M62.81) · Other lack of cordination (R27.8) · Other abnormalities of gait and mobility (R26.89) Precaution/Allergies: 
Atenolol ASSESSMENT:  
Ms. Valli Boas presents supine on contact and needing to get up to the [facility]. Pt is continuing to have diarrhea and feeling weak. She needed minimal assist and verbal cues to get out of bed and transfer over to [facility]. Worked on some standing while new brief was donned. Then pt got back to bed with minimal assist. She was not up for further therapy. Hope to progress as she is feeling better. This section established at most recent assessment PROBLEM LIST (Impairments causing functional limitations): 1. Decreased Strength 2. Decreased ADL/Functional Activities 3. Decreased Transfer Abilities 4. Decreased Ambulation Ability/Technique 5. Decreased Balance 6. Decreased Activity Tolerance 7. Decreased Work Simplification/Energy Conservation Techniques 8. Decreased Flexibility/Joint Mobility 9. Decreased [place] with Home Exercise Program 
 INTERVENTIONS PLANNED: (Benefits and precautions of physical therapy have been discussed with the patient.) 1. Balance Exercise 2. Bed Mobility 3. Family Education 4. Gait Training 5. Home Exercise Program (HEP) 6. Range of Motion (ROM) 7. Therapeutic Activites 8. Therapeutic Exercise/Strengthening 9. Transfer Training 10. endurance activities TREATMENT PLAN: Frequency/Duration: daily for duration of hospital stay Rehabilitation Potential For Stated Goals: Fair RECOMMENDED REHABILITATION/EQUIPMENT: (at time of discharge pending progress): Due to the probability of continued deficits (see above) this patient will likely need continued skilled physical therapy after discharge. Equipment:  
? uses a rollator at home HISTORY:  
History of Present Injury/Illness (Reason for Referral): Admitted after syncopal epeisode Past Medical History/Comorbidities: Ms. Gene Ellison  has a past medical history of Arthritis, Asthma, CAD (coronary artery disease), Cancer ([facility] Chronic kidney disease, Heart failure ([facility] Hypertension, Psychiatric disorder, and PUD (peptic ulcer disease). Ms. Gene Ellison  has a past surgical history that includes hx urological; hx other surgical; and hx heent. Social History/Living Environment:  
Home Environment: Apartment(3rd floor apt. Uses elevator) # Steps to Enter: 0 One/Two Story Residence: One story Living Alone: Yes Support Systems: (grand son is around part of the time) Patient Expects to be Discharged to[de-identified] (uncertain whether she'll go back to stay with dtr while awaiting going back to [place]) Current DME Used/Available at Home: [address], rollator Tub or Shower Type: Shower Prior Level of Function/Work/Activity: 
Lives in an apt in [place] and uses rollator. Performs ADLs independently Dominant Side: RIGHT Number of Personal Factors/Comorbidities that affect the Plan of Care: 1-2: MODERATE COMPLEXITY EXAMINATION:  
Most Recent Physical Functioning:  
Gross Assessment: 
  
         
  
Posture: 
  
Balance: 
Sitting: Intact Standing: Pull to stand; With support Bed Mobility: 
Supine to Sit: Minimum assistance; Additional time Sit to Supine: Minimum assistance Wheelchair Mobility: 
  
Transfers: 
Sit to Stand: Minimum assistance Stand to Sit: Contact guard assistance Bed to Chair: Contact guard assistance;Minimum assistance Gait: 
  
Base of Support: Narrowed Speed/Gemini: Pace decreased (<100 feet/min); Shuffled Gait Abnormalities: Decreased step clearance;Shuffling gait Assistive Device: (hand held assist) Ambulation - Level of Assistance: Contact guard assistance;Minimal assistance Interventions: Safety awareness training;Verbal cues Body Structures Involved: 1. Joints 2. Muscles Body Functions Affected: 1. Movement Related Activities and Participation Affected: 1. General Tasks and Demands 2. Mobility 3. Self Care Number of elements that affect the Plan of Care: 4+: HIGH COMPLEXITY CLINICAL PRESENTATION:  
Presentation: Stable and uncomplicated: LOW COMPLEXITY CLINICAL DECISION MAKIN[address] [place] [address] AM-PAC 6 Clicks Basic Mobility Inpatient Short Form How much difficulty does the patient currently have. .. Unable A Lot A Little None 1. Turning over in bed (including adjusting bedclothes, sheets and blankets)? [] 1   [] 2   [x] 3   [] 4  
2. Sitting down on and standing up from a chair with arms ( e.g., wheelchair, bedside commode, etc.)   [] 1   [] 2   [x] 3   [] 4  
3. Moving from lying on back to sitting on the side of the bed? [] 1   [] 2   [x] 3   [] 4 How much help from another person does the patient currently need. .. Total A Lot A Little None 4. Moving to and from a bed to a chair (including a wheelchair)?    [] 1   [] 2   [x] 3   [] 4  
 5.  Need to walk in hospital room? [] 1   [] 2   [x] 3   [] 4  
6. Climbing 3-5 steps with a railing? [] 1   [x] 2   [] 3   [] 4  
© 2007, Trustees of [address] [place] [address] [address] [address], under license to [employer]. All rights reserved Score:  Initial: 17 Most Recent: X (Date: -- ) Interpretation of Tool:  Represents activities that are increasingly more difficult (i.e. Bed mobility, Transfers, Gait). Medical Necessity:    
· Patient demonstrates good rehab potential due to higher previous functional level. Reason for Services/Other Comments: 
· Patient continues to require present interventions due to patient's inability to perform functional mobility independently. Use of outcome tool(s) and clinical judgement create a POC that gives a: Clear prediction of patient's progress: LOW COMPLEXITY  
  
 
 
 
TREATMENT:  
(In addition to Assessment/Re-Assessment sessions the following treatments were rendered) Pre-treatment Symptoms/Complaints:  diarrhea Pain: Initial: not reporting any pain Post Session:  0 Therapeutic Activity: (    10): Therapeutic activities including Bed transfers, Toilet transfers, Ambulation on level ground to improve mobility, strength, balance and coordination. Required minimal Safety awareness training;Verbal cues to promote static and dynamic balance in standing. Date: 
2/15/19 Date: 
 Date: Activity/Exercise Parameters Parameters Parameters SITTING: ankle pumps 10 Hip flex 10 LAQ 10 Shldr flex 10 Elbow flex/ext 10 Braces/Orthotics/Lines/Etc:  
· IV 
· O2 Device: Room air Treatment/Session Assessment:   
· Response to Treatment:  Tolerated fair, her diarrhea is limiting therapy · Interdisciplinary Collaboration:  
o Certified Nursing Assistant/Patient Care Technician · After treatment position/precautions:  
o Supine in bed 
o Bed/Chair-wheels locked 
o Call light within reach · Compliance with Program/Exercises: Will assess as treatment progresses · Recommendations/Intent for next treatment session: \"Next visit will focus on advancements to more challenging activities and reduction in assistance provided\". Total Treatment Duration: PT Patient Time In/Time Out Time In: 1115 Time Out: 1125 Zaira Beauchamp, PT

## 2019-02-17 NOTE — PROGRESS NOTES
Pt c/o 4/10 abdominal pain and nausea. Administered 5 mg Roxicodone PO and 4 mg Zofran IV per MD order. Will continue to monitor

## 2019-02-18 NOTE — PROGRESS NOTES
Assessment done via doc flow sheet. Pt resting quietly in bed, alert & oriented x4, resp easy & regular, lungs CTA. Denies abdominal pain. Call light within reach, pt instructed to call for assistance as needed.

## 2019-02-18 NOTE — PROGRESS NOTES
Pt is AAO x 4 with normal unlabored respirations present on RA. IV site is CDI and infusing. Denies pain or other needs. Bed is low and locked with call light in reach. Will continue to monitor.

## 2019-02-18 NOTE — PROGRESS NOTES
GI DAILY PROGRESS NOTE Admit Date:  2/13/2019 Today's Date:  2/18/2019 CC:  Abdominal pain, Anemia, diarrhea Subjective:  
 
Patient just returned from UGI series. She reports that she does not think the barium went all the way down. She felt like it got \"stuck\" in the cervical region. Denies any nausea/vomiting. Reports intermittent upper abdominal pain with eating. Reports having 1 loose brown stool overnight. There are 5 stools charted yesterday. Medications:  
Current Facility-Administered Medications Medication Dose Route Frequency  magnesium sulfate 4 g/100 mL IVPB  4 g IntraVENous ONCE  
 magnesium oxide (MAG-OX) tablet 400 mg  400 mg Oral BID  digoxin (LANOXIN) tablet 0.125 mg  0.125 mg Oral DAILY  acetaminophen (TYLENOL) tablet 650 mg  650 mg Oral Q6H PRN  
 oxyCODONE IR (ROXICODONE) tablet 5 mg  5 mg Oral Q6H PRN  
 0.9% sodium chloride infusion 250 mL  250 mL IntraVENous PRN  
 dilTIAZem CD (CARDIZEM CD) capsule 120 mg  120 mg Oral DAILY  pantoprazole (PROTONIX) tablet 40 mg  40 mg Oral ACB&D  
 sodium chloride (NS) flush 5-10 mL  5-10 mL IntraVENous PRN  
 0.9% sodium chloride infusion  25 mL/hr IntraVENous CONTINUOUS  
 aspirin chewable tablet 81 mg  81 mg Oral DAILY  piperacillin-tazobactam (ZOSYN) 4.5 g in 0.9% sodium chloride (MBP/ADV) 100 mL  4.5 g IntraVENous Q12H  
 ondansetron (ZOFRAN) injection 4 mg  4 mg IntraVENous Q4H PRN Review of Systems: ROS was obtained, with pertinent positives as listed above. No chest pain or SOB. Diet:   
 
Objective:  
Vitals: 
Visit Vitals /74 Pulse 69 Temp 97.5 °F (36.4 °C) Resp 18 Ht 5' 5.5\" (1.664 m) Wt 55.8 kg (123 lb) SpO2 96% Breastfeeding? No  
BMI 20.16 kg/m² Intake/Output: 
No intake/output data recorded. 02/16 1901 - 02/18 0700 In: -  
Out: 450 [Urine:450] Exam: 
General appearance: alert, cooperative, no distress Lungs: clear to auscultation bilaterally anteriorly Heart: regular rate and rhythm Abdomen: soft, TTP in epigastric region. Bowel sounds normal. No masses, no organomegaly Extremities: extremities normal, atraumatic, no cyanosis or edema Neuro:  alert and oriented Data Review (Labs):   
Recent Labs  
  02/18/19 
0554 02/17/19 
1751 02/17/19 
0550 02/16/19 
4862 WBC 11.7*  --  12.8* 13.2* HGB 11.2* 11.5* 10.4* 12.0 HCT 37.8 37.7 33.7* 39.9   --  231 242 MCV 90.9  --  86.2 87.3   --  142 143  
K 4.0  --  4.0 4.5  
*  --  112* 112* CO2 20*  --  22 21 BUN 14  --  17 22 CREA 1.41*  --  1.42* 1.46* CA 7.4*  --  7.3* 7.3*  
MG 1.2*  --  1.3*  --   
GLU 58*  --  64* 62* Assessment:  
 
Principal Problem: 
  Syncope and collapse (2/13/2019) Active Problems: 
  Atrial fibrillation with rapid ventricular response ([address] (4/27/2018) Systolic CHF, acute on chronic ([address] (4/27/2018) Solitary right kidney (4/27/2018) S/P lobectomy of lung (4/27/2018) Pneumonia (2/13/2019) Sepsis ([address] (2/13/2019) Diarrhea (2/13/2019) Edema (2/13/2019) Pleural effusion (2/13/2019) 81 y/o female with h/o CHF (EF 35-40% per recent ECHO), moderate MR, mild TR, HTN, asthma, kidney cancer s/p resection 2016, a. Fib, hx lung nodule s/p pneumonectomy admitted with syncope, diagnosed with pneumonia, elevated lactate and volume overload and seen by GI for abdominal pain, diarrhea (x 3 months), weight loss and drop in hemoglobin with normocytic anemia. Iron studies are not consistent with iron deficiency. Stool culture and c. Diff negative. Received 2 units PRBCs. Heme positive stool 2/18/19: HGB 11.2. Plan:  
 
Await UGI series planned for today to further evaluate the esophagus. Continue with PPI Possible EGD and Colonoscopy pending UGI series results. Clear liquid diet for now Fecal lactoferrin has been collected and is processing Kari Groves NP 
 
 Patient is seen and examined in collaboration with Dr. Gracie Carroll. .  Assessment and plan as per Dr. Gracie Carroll.

## 2019-02-18 NOTE — PROGRESS NOTES
Visited with pt regarding plans for discharge, pt is going to d/c to her drt's home here in [place], pt is from [place]. Eddie Bradford has set up [facility] for pt and I spoke with Olivia Be to get pt a PCP appt after d/c. Pt is sched for a EGD/colon on tues, depending on results d/c will follow shortly after. Dtr is requesting [place] [facility] PT/OT/RN services upon d/c. 
 
2/18 1400 In accordance with Medicare guidelines, a copy of the Important Letter from Medicare was presented to the patient/family in anticipation of expected discharge within 48 hours. One copy was given to the patient, and a signed copy was placed in the patients chart. 2/20 @ 1300 Saw pt in interdisciplinarily rounds, plan of care and discharge date/ location discussed. Per the hospitalitis will be ready in 2-3 more days to go home with family assistance and [facility] RN. 
 
2/22 Saw pt in interdisciplinarily rounds, plan of care and discharge date/ location discussed. Per the hospitalitis states taht it will be 2 more days, Sunday. 2/25 @ 1045 Drt at bedside, states that she spoke with Nilo Coello @ [id number] and stated that Marvin Ayoub would be out today about 1330 to meet with pt and family. 2/25 90 [address] meet with family and pt, pt qualifies for Hospice and will be d/c home tomorrow, DNR signed, equipment being arranged via Hospice, they can have a nurse out to the pt's house b/w [phone] on tues morning.  
 
2/26 Pt ready for d/c home with Hospice, all equipment has been set up in the home and Odilon Jaffe is sce for  @ [phone], the hospice nurse will be at the house by 11am.

## 2019-02-18 NOTE — PROGRESS NOTES
Hospitalist Progress Note Admit Date:  2019 11:29 AM  
Name:  Myriam Lux Age:  80 y.o. 
:  1932 MRN:  [id number] PCP:  [facility], Not On File Treatment Team: Attending Provider: Bhavik Torres MD; Care Manager: [facility] Consulting Provider: TRACIE Eaton; Utilization Review: Isabella Modi RN; Utilization Review: Bing Schreiber RN As per Prior notes: \"CC: passed out in [facility] today after using the bathroom HPI:  
80yr old female with pmhx sig for chf, htn, asthma. kidney cancer s/p resection, hx of a lung nodule - not known to be cancerous but s/p pneumonectomy. The patient states she has been unwell for months, with diarrhea for months. .. Last admitted to hospital in [address] [place] [address] in December after passing out at the [facility], she was given blood, scheduled for colonoscopy but poor prep so it was not done. She passed at her home in [address] [place] [address] on Saturday- daughter went to get her and brought her [place]. ... She wanted to go to the bank today. . But needed to use the bathroom on the way. . They stopped at [facility]-- the pt had a bm and passed out at the sink while washing her hands. .. Ems - called. .. In the er cxr concerning for pneumonia; lactic acid elevated. hospitalist asked to admit\" The patient was admitted. She was started on abx for the pneumonia. She was also transfused 2 units of PRBCS. She was seen by GI with plans for egd and colonoscopy once stable from a cardiology and resp point of view. Cardiology was consulted automatically in the admitting order set and have signed off. She has felt improved with diuresis and the blood. Her major concern continues the to be the diarrhea of unclear etiology. Cdiff is negative. She has no evidence of dig toxicity. She is allergic to BB with a history of swelling so these were not started.  Plan si for egd this week with gi- they have not decided on imaging for her altered anatomy or not at this time. 02/18/2019- Pt seen at bedside Pt's daughter present as well. Plan of care discussed with pt and her daughter Pt denies any abdominal pain, nausea/vomiting, chills, fever, SOB, cough. Discussed about EGD and colonoscopy. 10 point ROS negative except what mentioned above PTA Medications: 
Prior to Admission Medications Prescriptions Last Dose Informant Patient Reported? Taking?  
aspirin 81 mg chewable tablet   Yes Yes Sig: Take 81 mg by mouth daily. calcium carbonate (TUMS) 200 mg calcium (500 mg) chew   Yes Yes Sig: Take 1 Tab by mouth every six (6) hours as needed. Indications: Heartburn  
dilTIAZem (CARDIZEM) 120 mg tablet   Yes Yes Sig: Take 120 mg by mouth daily. ferrous sulfate 325 mg (65 mg iron) tablet   No Yes Sig: Take 1 Tab by mouth daily (with breakfast). furosemide (LASIX) 40 mg tablet   Yes Yes Sig: Take  by mouth daily. lisinopril (PRINIVIL, ZESTRIL) 5 mg tablet   Yes Yes Sig: Take 5 mg by mouth daily. loperamide (IMMODIUM) 2 mg tablet   Yes Yes Sig: Take 2 mg by mouth four (4) times daily as needed for Diarrhea. Indications: Diarrhea  
omeprazole (PRILOSEC) 40 mg capsule   Yes Yes Sig: Take 40 mg by mouth daily. simvastatin (ZOCOR) 20 mg tablet   Yes Yes Sig: Take 20 mg by mouth nightly. Facility-Administered Medications: None Objective:  
 
Patient Vitals for the past 24 hrs: 
 Temp Pulse Resp BP SpO2  
02/18/19 0827  69  117/74   
02/18/19 0334 97.7 °F (36.5 °C) 69 20 118/71 97 % 02/17/19 2119 97.3 °F (36.3 °C) 69 16 115/67 98 % 02/17/19 1710 95.8 °F (35.4 °C) 93 14 106/55 93 % 02/17/19 1136 95.6 °F (35.3 °C) 68 16 116/67 95 % 02/17/19 0835 95.7 °F (35.4 °C) 87 17 106/73 91 % Oxygen Therapy O2 Sat (%): 97 % (02/18/19 0334) Pulse via Oximetry: 82 beats per minute (02/14/19 0612) O2 Device: Room air (02/17/19 9449) Intake/Output Summary (Last 24 hours) at 2/18/2019 2928 Last data filed at 2/17/2019 2047 Gross per 24 hour Intake  Output 200 ml Net -200 ml Physical Exam: 
General:    Well nourished. Alert. Loss of vision left eye Eyes:   Normal sclera. Extraocular movements intact. ENT:  Normocephalic, atraumatic. Moist mucous membranes CV:   RRR. No m/r/g. Peripheral pulses present. Capillary refill normal 
Lungs:  CTAB. No wheezing, rhonchi, or rales. Abdomen: Soft, nontender, nondistended. Bowel sounds normal.  
Extremities: Warm and dry. No cyanosis; bilateral pitting edema- increased. Sacral edema Neurologic: CN II-XII grossly intact. Sensation intact. Skin:     No rashes or jaundice. Normal coloration Psych:  Normal mood and affect. I reviewed the labs, imaging, EKGs, telemetry, and other studies done this admission. Data Review:  
Recent Results (from the past 24 hour(s)) HGB & HCT Collection Time: 02/17/19  5:51 PM  
Result Value Ref Range HGB 11.5 (L) 11.7 - 15.4 g/dL HCT 37.7 35.8 - 46.3 % METABOLIC PANEL, BASIC Collection Time: 02/18/19  5:54 AM  
Result Value Ref Range Sodium 144 136 - 145 mmol/L Potassium 4.0 3.5 - 5.1 mmol/L Chloride 113 (H) 98 - 107 mmol/L  
 CO2 20 (L) 21 - 32 mmol/L Anion gap 11 mmol/L Glucose 58 (L) 65 - 100 mg/dL BUN 14 8 - 23 MG/DL Creatinine 1.41 (H) 0.6 - 1.0 MG/DL  
 GFR est AA 45 (L) >60 ml/min/1.73m2 GFR est non-AA 38 ml/min/1.73m2 Calcium 7.4 (L) 8.3 - 10.4 MG/DL MAGNESIUM Collection Time: 02/18/19  5:54 AM  
Result Value Ref Range Magnesium 1.2 (LL) 1.8 - 2.4 mg/dL CBC WITH AUTOMATED DIFF Collection Time: 02/18/19  5:54 AM  
Result Value Ref Range WBC 11.7 (H) 4.3 - 11.1 K/uL  
 RBC 4.16 4.05 - 5.2 M/uL  
 HGB 11.2 (L) 11.7 - 15.4 g/dL HCT 37.8 35.8 - 46.3 % MCV 90.9 79.6 - 97.8 FL  
 MCH 26.9 26.1 - 32.9 PG  
 MCHC 29.6 (L) 31.4 - 35.0 g/dL RDW 20.2 (H) 11.9 - 14.6 % PLATELET 045 396 - 336 K/uL MPV 10.3 9.4 - 12.3 FL ABSOLUTE NRBC 0.00 0.0 - 0.2 K/uL DF AUTOMATED NEUTROPHILS 77 43 - 78 % LYMPHOCYTES 12 (L) 13 - 44 % MONOCYTES 5 4.0 - 12.0 % EOSINOPHILS 4 0.5 - 7.8 % BASOPHILS 1 0.0 - 2.0 % IMMATURE GRANULOCYTES 2 0.0 - 5.0 %  
 ABS. NEUTROPHILS 9.1 (H) 1.7 - 8.2 K/UL  
 ABS. LYMPHOCYTES 1.4 0.5 - 4.6 K/UL  
 ABS. MONOCYTES 0.5 0.1 - 1.3 K/UL  
 ABS. EOSINOPHILS 0.4 0.0 - 0.8 K/UL  
 ABS. BASOPHILS 0.1 0.0 - 0.2 K/UL  
 ABS. IMM. GRANS. 0.2 0.0 - 0.5 K/UL All Micro Results Procedure Component Value Units Date/Time CULTURE, BLOOD [[id number]] Collected:  02/13/19 1647 Order Status:  Completed Specimen:  Blood Updated:  02/17/19 1345 Special Requests: --     
  RIGHT 
ARM Culture result: NO GROWTH 4 DAYS     
 CULTURE, BLOOD [[id number]] Collected:  02/13/19 2103 Order Status:  Completed Specimen:  Blood Updated:  02/17/19 1345 Special Requests: --     
  RIGHT JUGULAR VEIN Culture result: NO GROWTH 4 DAYS     
 CULTURE, BLOOD [[id number]] Collected:  02/13/19 1644 Order Status:  Completed Specimen:  Blood Updated:  02/17/19 1345 Special Requests: --     
  RIGHT JUGULAR VEIN Culture result: NO GROWTH 4 DAYS     
 CULTURE, STOOL [[id number]] Collected:  02/14/19 1901 Order Status:  Completed Specimen:  Stool Updated:  02/17/19 6356 Special Requests: NO SPECIAL REQUESTS Culture result:    
  No Salmonella, Shigella, or Ecoli 0157 isolated. CULTURE, URINE [[id number]] Collected:  02/13/19 1556 Order Status:  Completed Specimen:  Urine Updated:  02/16/19 8335 Special Requests: NO SPECIAL REQUESTS Culture result: NO GROWTH 2 DAYS C. DIFFICILE AG & TOXIN A/B [[id number]] Collected:  02/14/19 1900 Order Status:  Completed Specimen:  Stool Updated:  02/15/19 1428 [address] [place] ANTIGEN    
  C. DIFFICILE GDH ANTIGEN-NEGATIVE  
     
  C. difficile toxin C. DIFFICILE TOXIN-NEGATIVE  
     
  PCR Reflex NOT APPLICABLE   INTERPRETATION    
 NEGATIVE FOR TOXIGENIC C. DIFFICILE Clinical Consideration NEGATIVE FOR TOXIGENIC C. DIFFICILE  
     
 C. DIFFICILE AG & TOXIN A/B [[id number]] Collected:  02/14/19 1901 Order Status:  Canceled Specimen:  Stool Updated:  02/14/19 1929 Other Studies: No results found. Assessment and Plan:  
 
Hospital Problems as of 2/18/2019 Never Reviewed Codes Class Noted - Resolved POA Pneumonia ICD-10-CM: J18.9 ICD-9-CM: 820  2/13/2019 - Present Unknown Sepsis ([address] ICD-10-CM: A41.9 ICD-9-CM: 038.9, 995.91  2/13/2019 - Present Unknown * (Principal) Syncope and collapse ICD-10-CM: R55 
ICD-9-CM: 780.2  2/13/2019 - Present Yes Diarrhea ICD-10-CM: R19.7 ICD-9-CM: 787.91  2/13/2019 - Present Yes Edema ICD-10-CM: R60.9 ICD-9-CM: 782.3  2/13/2019 - Present Yes Pleural effusion ICD-10-CM: J90 ICD-9-CM: 511.9  2/13/2019 - Present Yes Atrial fibrillation with rapid ventricular response (HCC) ICD-10-CM: I48.91 
ICD-9-CM: 427.31  4/27/2018 - Present Yes Systolic CHF, acute on chronic (HCC) (Chronic) ICD-10-CM: R39.67 ICD-9-CM: 428.23, 428.0  4/27/2018 - Present Yes Solitary right kidney (Chronic) ICD-10-CM: Q60.0 ICD-9-CM: 753.0  4/27/2018 - Present Yes S/P lobectomy of lung (Chronic) ICD-10-CM: Z90.2 ICD-9-CM: V45.89  4/27/2018 - Present Yes PLAN ( remains unchanged except as outlined below): · Syncope- due to symptomatic anemia-  request placed for old records from [facility] [employer] system- nothing has come yet · Pneumonia- cultures ngtd- continue with zosyn; vanc stopped. · Sepsis- resolved · Effusion & Edema- multifactorial from chf and severe protein malnutrition- iv lasix on hold per cardiology- back on  ivfls- will decrease to 25ml/hr. monitor for intravascular volume and 3rd spacing; stop albumin for now · afib with rvr- currently on CCB and dig- bb not started secondary to hx of swelling with atenolol. . currently rate controlled - will monitor · Diarrhea- has been ongoing since admit and also today  - cdiff is negative & dig level is wnl so not likely due to dig toxicity-  occult blood is positive- she is on a clear liquid diet until final decision with regards to scope made- upper GI series is pending. May need EGD and colonoscopy. · Concern for uti- cultures NGTD · Anemia: stable with Hb 11.2 · Malnutrition- nutrition consulted- and following · Abdominal pain- prn pain meds- no pain today · Further workup and mgt based on her clinical course · Ppd 
· Pt 
· ot · Pt had not walked in about 1 week prior to presentation- she walks with a rollator- currently working with PT 
 
DVT ppx:  scds Anticipated DC needs:  outpt pcp, goal is discharge back to home with her family when medically stable likely after edg and colonoscopy. Code status:  Full Estimated LOS:  Unclear at the moment Risk:  high Signed: 
Maine Cam MD

## 2019-02-18 NOTE — PROGRESS NOTES
Problem: Interdisciplinary Rounds Goal: Interdisciplinary Rounds Interdisciplinary team rounds were held 2/18/2019 with the following team members:Care Management, Nursing, Nutrition, Pharmacy, Physical Therapy and Physician and the patient and daughter. Plan of care discussed. See clinical pathway and/or care plan for interventions and desired outcomes.

## 2019-02-18 NOTE — PROGRESS NOTES
Problem: Mobility Impaired (Adult and Pediatric) Goal: *Acute Goals and Plan of Care (Insert Text) STG: 
(1.)Ms. Dane Suggs will move from supine to sit and sit to supine  with SUPERVISION within 5 treatment day(s). (2.)Ms. Dane Suggs will transfer from bed to chair and chair to bed with SUPERVISION using the least restrictive device within 5 treatment day(s). (3.)Ms. Dane Suggs will ambulate with SUPERVISION for 200 feet with the least restrictive device within 5 treatment day(s). (4.)Pt. Will increase B LE strength 1/2 grade within 5 days 
 
 
________________________________________________________________________________________________ PHYSICAL THERAPY: Daily Note and AM 2/18/2019INPATIENT: PT Visit Days : 3 [place] / [place] B / Product Type: Medicare /   
  
NAME/AGE/GENDER: Katelin Paul is a 80 y.o. female PRIMARY DIAGNOSIS: Pneumonia [J18.9] [facility] [A41.9] Syncope and collapse Syncope and collapse ICD-10: Treatment Diagnosis:  
 · Generalized Muscle Weakness (M62.81) · Other lack of cordination (R27.8) · Other abnormalities of gait and mobility (R26.89) Precaution/Allergies: 
Atenolol ASSESSMENT:  
Ms. Dane Suggs supine on contact and needing to get up to the [facility]. She walks 10 ft to [facility] and back. She return to bed and performs exercises without any problems. She remain in the bed with call light near. This section established at most recent assessment PROBLEM LIST (Impairments causing functional limitations): 1. Decreased Strength 2. Decreased ADL/Functional Activities 3. Decreased Transfer Abilities 4. Decreased Ambulation Ability/Technique 5. Decreased Balance 6. Decreased Activity Tolerance 7. Decreased Work Simplification/Energy Conservation Techniques 8. Decreased Flexibility/Joint Mobility 9.  Decreased Coal with Home Exercise Program 
 INTERVENTIONS PLANNED: (Benefits and precautions of physical therapy have been discussed with the patient.) 1. Balance Exercise 2. Bed Mobility 3. Family Education 4. Gait Training 5. Home Exercise Program (HEP) 6. Range of Motion (ROM) 7. Therapeutic Activites 8. Therapeutic Exercise/Strengthening 9. Transfer Training 10. endurance activities TREATMENT PLAN: Frequency/Duration: daily for duration of hospital stay Rehabilitation Potential For Stated Goals: Fair RECOMMENDED REHABILITATION/EQUIPMENT: (at time of discharge pending progress): Due to the probability of continued deficits (see above) this patient will likely need continued skilled physical therapy after discharge. Equipment:  
? uses a rollator at home HISTORY:  
History of Present Injury/Illness (Reason for Referral): Admitted after syncopal epeisode Past Medical History/Comorbidities: Ms. Mike Torres  has a past medical history of Arthritis, Asthma, CAD (coronary artery disease), Cancer ([facility] Chronic kidney disease, Heart failure ([facility] Hypertension, Psychiatric disorder, and PUD (peptic ulcer disease). Ms. Mike Torres  has a past surgical history that includes hx urological; hx other surgical; and hx heent. Social History/Living Environment:  
Home Environment: Apartment(3rd floor apt. Uses elevator) # Steps to Enter: 0 One/Two Story Residence: One story Living Alone: Yes Support Systems: (grand son is around part of the time) Patient Expects to be Discharged to[de-identified] (uncertain whether she'll go back to stay with dtr while awaiting going back to [place]) Current DME Used/Available at Home: [employer], rollator Tub or Shower Type: Shower Prior Level of Function/Work/Activity: 
Lives in an apt in [place] and uses rollator. Performs ADLs independently Dominant Side:  
      RIGHT Number of Personal Factors/Comorbidities that affect the Plan of Care: 1-2: MODERATE COMPLEXITY EXAMINATION:  
Most Recent Physical Functioning:  
Gross Assessment: 
  
         
  
Posture: 
  
Balance: 
  Bed Mobility: Supine to Sit: Minimum assistance Sit to Supine: Minimum assistance Wheelchair Mobility: 
  
Transfers: 
Sit to Stand: Minimum assistance Stand to Sit: Contact guard assistance Bed to Chair: Contact guard assistance Duration: 30 Minutes Gait: 
  
Base of Support: Narrowed Speed/Gemini: Pace decreased (<100 feet/min) Gait Abnormalities: Decreased step clearance Distance (ft): 10 Feet (ft) Assistive Device: (no AD) Ambulation - Level of Assistance: Contact guard assistance Interventions: Safety awareness training Body Structures Involved: 1. Joints 2. Muscles Body Functions Affected: 1. Movement Related Activities and Participation Affected: 1. General Tasks and Demands 2. Mobility 3. Self Care Number of elements that affect the Plan of Care: 4+: HIGH COMPLEXITY CLINICAL PRESENTATION:  
Presentation: Stable and uncomplicated: LOW COMPLEXITY CLINICAL DECISION MAKING:  
[facility] AM-PAC 6 Clicks Basic Mobility Inpatient Short Form How much difficulty does the patient currently have. .. Unable A Lot A Little None 1. Turning over in bed (including adjusting bedclothes, sheets and blankets)? [] 1   [] 2   [x] 3   [] 4  
2. Sitting down on and standing up from a chair with arms ( e.g., wheelchair, bedside commode, etc.)   [] 1   [] 2   [x] 3   [] 4  
3. Moving from lying on back to sitting on the side of the bed? [] 1   [] 2   [x] 3   [] 4 How much help from another person does the patient currently need. .. Total A Lot A Little None 4. Moving to and from a bed to a chair (including a wheelchair)? [] 1   [] 2   [x] 3   [] 4  
5. Need to walk in hospital room? [] 1   [] 2   [x] 3   [] 4  
6. Climbing 3-5 steps with a railing? [] 1   [x] 2   [] 3   [] 4  
© 2007, Trustees of [facility], under license to [email or web address]. All rights reserved Score:  Initial: 17 Most Recent: X (Date: -- ) Interpretation of Tool:  Represents activities that are increasingly more difficult (i.e. Bed mobility, Transfers, Gait). Medical Necessity:    
· Patient demonstrates good rehab potential due to higher previous functional level. Reason for Services/Other Comments: 
· Patient continues to require present interventions due to patient's inability to perform functional mobility independently. Use of outcome tool(s) and clinical judgement create a POC that gives a: Clear prediction of patient's progress: LOW COMPLEXITY  
  
 
 
 
TREATMENT:  
(In addition to Assessment/Re-Assessment sessions the following treatments were rendered) Pre-treatment Symptoms/Complaints:  diarrhea Pain: Initial: not reporting any pain Pain Intensity 1: 0(0/10 after therapy)  Post Session:    
 
Therapeutic Activity: (  30 Minutes ):  Therapeutic activities including Bed transfers, Toilet transfers, Ambulation on level ground to improve mobility, strength, balance and coordination. Required minimal Safety awareness training to promote static and dynamic balance in standing. Date: 
2/15/19 Date: 
2/18 Date: Activity/Exercise Parameters Parameters Parameters SITTING: ankle pumps 10 12 Hip flex 10 LAQ 10 Shldr flex 10 Elbow flex/ext 10 Quad sets  12   
heelslides  12 Hip abd/add  12 SAQ  12 All exercises B Braces/Orthotics/Lines/Etc:  
· IV 
· O2 Device: Room air Treatment/Session Assessment:   
· Response to Treatment:  Tolerated fair. · Interdisciplinary Collaboration:  
o Registered Nurse · After treatment position/precautions:  
o Supine in bed 
o Bed/Chair-wheels locked 
o Call light within reach · Compliance with Program/Exercises: Will assess as treatment progresses Recommendations/Intent for next treatment session: \"Next visit will focus continue to work on exercises to get stronger, and work on gait distance Total Treatment Duration: PT Patient Time In/Time Out Time In: 6772 Time Out: 1490 Eva Garcia, PTA

## 2019-02-19 NOTE — ROUTINE PROCESS
TRANSFER - OUT REPORT: 
 
Verbal report given to Cinthya Zee RN on Deri Situ  being transferred to [address] [phone] for ordered procedure Report consisted of patients Situation, Background, Assessment and  
Recommendations(SBAR). Information from the following report(s) SBAR, Procedure Summary and MAR was reviewed with the receiving nurse. Lines:  
Peripheral IV 02/13/19 Right External jugular (Active) Site Assessment Clean, dry, & intact 2/19/2019  1:25 PM  
Phlebitis Assessment 0 2/19/2019  1:25 PM  
Infiltration Assessment 0 2/19/2019  1:25 PM  
Dressing Status Clean, dry, & intact 2/19/2019  1:25 PM  
Dressing Type Tape;Transparent 2/19/2019  1:25 PM  
Hub Color/Line Status Pink; Infusing 2/19/2019 12:37 PM  
  
 
Opportunity for questions and clarification was provided.

## 2019-02-19 NOTE — PROGRESS NOTES
TRANSFER - IN REPORT: 
 
Verbal report received from Lisette Tay RN(name) on Myriam Lux  being received from GI(unit) for routine progression of care Report consisted of patients Situation, Background, Assessment and  
Recommendations(SBAR). Information from the following report(s) SBAR, Kardex, OR Summary and MAR was reviewed with the receiving nurse. Opportunity for questions and clarification was provided. Assessment completed upon patients arrival to unit and care assumed.

## 2019-02-19 NOTE — PROCEDURES
Esophagogastroduodenoscopy DATE of PROCEDURE: 2/19/2019 MEDICATIONS ADMINISTERED: MAC INSTRUMENT: GIF 
 
PROCEDURE:  After obtaining informed consent, the patient was placed in the left lateral position and sedated. The endoscope was advanced under direct vision without difficulty. The esophagus, stomach (including retroflexed views) and duodenum were evaluated. The patient was taken to the recovery area in stable condition. FINDINGS: 
ESOPHAGUS:  The esophagus was intubated carefully and was noted to be tortuous in the upper esophageal area. There is a 3cm hiatal hernia noted with widely patent GE junction. On withdrawal of the scope it was noted that there was mucosal tear at 15cm consistent with dilation of a benign stricture. Further dilation would not be safe given extent of tear with just EGD scope. No significant bleeding noted. STOMACH:  Normal and biopsies taken for h pylori DUODENUM: Normal 
 
Estimated blood loss: 0-minimal  
 
PLAN: 
1. Advance to full liquids and monitor weight in 4 to 6 weeks. Advance to soft diet in 24 hours 2. Patient would not be able to prep for and withstand a colonoscopy in my opinion and EGD findings are enough to explain weight loss and GERD complaints. However CEA was checked and is 97 -  Proceed with CT Scan abd/pelvis with IV and PO contrast. 
3. PPI 40mg once daily 4. Will schedule office visit in 4 weeks.  
 
Vonnie Friend MD 
[facility], [place]

## 2019-02-19 NOTE — PROGRESS NOTES
[facility] ambulance service called for transport back to [address] [place] [address] room 358. ETA 45 min per dispatcher.

## 2019-02-19 NOTE — PROGRESS NOTES
Shift Assessment - Patient is alert and oriented x4. No complaint of pain at this time. Patient NPO for EGD this morning downtown. Patient is up with assist x1 to [facility]. Resting in low, locked bed. Call light in reach. Daughter is at bedside.

## 2019-02-19 NOTE — PROGRESS NOTES
Hospitalist Progress Note Admit Date:  2019 11:29 AM  
Name:  Lucero Gleason Age:  80 y.o. 
:  1932 MRN:  [id number] PCP:  Constanza, Not On File Treatment Team: Attending Provider: Coretta Fontanez MD; Care Manager: Marena Cogan Consulting Provider: TRACIE Francis; Utilization Review: Virginia Parish RN; Utilization Review: David Lujan RN; Consulting Provider: Michael Frye MD; Consulting Provider: Nabeel Rice DO Subjective:  
80yr old female with pmhx sig for chf, htn, asthma. kidney cancer s/p resection, hx of a lung nodule - not known to be cancerous but s/p pneumonectomy. The patient states she has been unwell for months, with diarrhea for months. .. Last admitted to hospital in [address] [place] [address] in December after passing out at the [employer], she was given blood, scheduled for colonoscopy but poor prep so it was not done. She passed at her home in [address] [place] [address] on Saturday- daughter went to get her and brought her Topaz. ... She wanted to go to the bank today. . But needed to use the bathroom on the way. . They stopped at [facility]-- the pt had a bm and passed out at the sink while washing her hands. .. Ems - called. .. In the er cxr concerning for pneumonia; lactic acid elevated. hospitalist asked to admit\" 
  
  
The patient was admitted. She was started on abx for the pneumonia. She was also transfused 2 units of PRBCS. She was seen by GI with plans for egd and colonoscopy once stable from a cardiology and resp point of view. Cardiology was consulted automatically in the admitting order set and have signed off. She has felt improved with diuresis and the blood. Her major concern continues the to be the diarrhea of unclear etiology. Cdiff is negative. She has no evidence of dig toxicity. She is allergic to BB with a history of swelling so these were not started.  Plan si for egd this week with gi- they have not decided on imaging for her altered anatomy or not at this time. 2/19/19 Pt going for egd  Today Daughter at bedside No complaints Objective:  
 
Patient Vitals for the past 24 hrs: 
 Temp Pulse Resp BP SpO2  
02/19/19 1538 96 °F (35.6 °C) 85 16 116/65 98 % 02/19/19 0748 96.3 °F (35.7 °C) 75 16 111/73 96 % 02/19/19 0340 97.8 °F (36.6 °C) 77 16 120/73 99 % 02/18/19 2302 95.4 °F (35.2 °C) 77 16 113/76 95 % 02/18/19 2231 95.9 °F (35.5 °C) 80 16 108/74 97 % Oxygen Therapy O2 Sat (%): 98 % (02/19/19 1538) Pulse via Oximetry: 82 beats per minute (02/14/19 0612) O2 Device: Room air (02/18/19 2022) Intake/Output Summary (Last 24 hours) at 2/19/2019 1740 Last data filed at 2/19/2019 1315 Gross per 24 hour Intake 250 ml Output 300 ml Net -50 ml General:    Well nourished. Alert.  
heent- normal - blind left eye CV:   RRR. No murmur, rub, or gallop. Lungs:   Clear to auscultation bilaterally. No wheezing, rhonchi, or rales. Abdomen:   Soft, nontender, nondistended. Obese Cns- no focal neurological deficits Extremities: Warm and dry. No cyanosis or edema. Skin:     No rashes or jaundice. Data Review: 
I have reviewed all labs, meds, telemetry events, and studies from the last 24 hours. Recent Results (from the past 24 hour(s)) CANCER AG 19-9 Collection Time: 02/19/19  6:01 AM  
Result Value Ref Range Cancer antigen 19-9 2.60 2.0 - 37.0 U/mL CEA Collection Time: 02/19/19  6:01 AM  
Result Value Ref Range CEA 97.0 (H) 0.0 - 3.0 ng/mL METABOLIC PANEL, BASIC Collection Time: 02/19/19  6:01 AM  
Result Value Ref Range Sodium 143 136 - 145 mmol/L Potassium 3.6 3.5 - 5.1 mmol/L Chloride 112 (H) 98 - 107 mmol/L  
 CO2 23 21 - 32 mmol/L Anion gap 8 mmol/L Glucose 65 65 - 100 mg/dL BUN 11 8 - 23 MG/DL Creatinine 1.28 (H) 0.6 - 1.0 MG/DL  
 GFR est AA 51 (L) >60 ml/min/1.73m2 GFR est non-AA 42 ml/min/1.73m2 Calcium 7.6 (L) 8.3 - 10.4 MG/DL MAGNESIUM  
 Collection Time: 02/19/19  6:01 AM  
Result Value Ref Range Magnesium 2.1 1.8 - 2.4 mg/dL All Micro Results Procedure Component Value Units Date/Time CULTURE, BLOOD [[id number]] Collected:  02/13/19 1644 Order Status:  Completed Specimen:  Blood Updated:  02/18/19 1532 Special Requests: --     
  RIGHT JUGULAR VEIN Culture result: NO GROWTH 5 DAYS     
 CULTURE, BLOOD [[id number]] Collected:  02/13/19 1647 Order Status:  Completed Specimen:  Blood Updated:  02/18/19 1532 Special Requests: --     
  RIGHT 
ARM Culture result: NO GROWTH 5 DAYS     
 CULTURE, BLOOD [[id number]] Collected:  02/13/19 2103 Order Status:  Completed Specimen:  Blood Updated:  02/18/19 1532 Special Requests: --     
  RIGHT JUGULAR VEIN Culture result: NO GROWTH 5 DAYS     
 CULTURE, STOOL [[id number]] Collected:  02/14/19 1901 Order Status:  Completed Specimen:  Stool Updated:  02/17/19 8175 Special Requests: NO SPECIAL REQUESTS Culture result:    
  No Salmonella, Shigella, or Ecoli 0157 isolated. CULTURE, URINE [[id number]] Collected:  02/13/19 1556 Order Status:  Completed Specimen:  Urine Updated:  02/16/19 4530 Special Requests: NO SPECIAL REQUESTS Culture result: NO GROWTH 2 DAYS C. DIFFICILE AG & TOXIN A/B [[id number]] Collected:  02/14/19 1900 Order Status:  Completed Specimen:  Stool Updated:  02/15/19 1424 [address] ANTIGEN    
  C. DIFFICILE GDH ANTIGEN-NEGATIVE  
     
  C. difficile toxin C. DIFFICILE TOXIN-NEGATIVE  
     
  PCR Reflex NOT APPLICABLE INTERPRETATION    
  NEGATIVE FOR TOXIGENIC C. DIFFICILE Clinical Consideration NEGATIVE FOR TOXIGENIC C. DIFFICILE  
     
 C. DIFFICILE AG & TOXIN A/B [[id number]] Collected:  02/14/19 1901 Order Status:  Canceled Specimen:  Stool Updated:  02/14/19 1929 Current Meds: 
Current Facility-Administered Medications Medication Dose Route Frequency  0.9% sodium chloride infusion  25 mL/hr IntraVENous CONTINUOUS  
 0.9% sodium chloride infusion  1 mL/kg/hr IntraVENous PRN  
 sodium chloride (NS) flush 5-10 mL  5-10 mL IntraVENous PRN  
 oxyCODONE IR (ROXICODONE) tablet 5 mg  5 mg Oral Q6H PRN  
 ondansetron (ZOFRAN) injection 4 mg  4 mg IntraVENous Q4H PRN  
 acetaminophen (TYLENOL) tablet 650 mg  650 mg Oral Q6H PRN  pantoprazole (PROTONIX) tablet 40 mg  40 mg Oral ACB&D  
 magnesium oxide (MAG-OX) tablet 400 mg  400 mg Oral BID  [START ON 2/20/2019] dilTIAZem CD (CARDIZEM CD) capsule 120 mg  120 mg Oral DAILY  [START ON 2/20/2019] digoxin (LANOXIN) tablet 0.125 mg  0.125 mg Oral DAILY  [START ON 2/20/2019] aspirin chewable tablet 81 mg  81 mg Oral DAILY  piperacillin-tazobactam (ZOSYN) 4.5 g in 0.9% sodium chloride (MBP/ADV) 100 mL  4.5 g IntraVENous Q12H  
 sodium chloride 0.9 % bolus infusion 100 mL  100 mL IntraVENous RAD ONCE  
 saline peripheral flush soln 10 mL  10 mL InterCATHeter RAD ONCE  
 iopamidol (ISOVUE-370) 76 % injection 100 mL  100 mL IntraVENous RAD ONCE  
 diatrizoate hakan-diatrizoat sod (MD-GASTROVIEW,GASTROGRAFIN) 66-10 % contrast solution 15 mL  15 mL Oral RAD ONCE Facility-Administered Medications Ordered in Other Encounters Medication Dose Route Frequency  lactated Ringers infusion 1,000 mL  1,000 mL IntraVENous CONTINUOUS Other Studies (last 24 hours): No results found. Assessment and Plan:  
 
Hospital Problems as of 2/19/2019 Never Reviewed Codes Class Noted - Resolved POA Pneumonia ICD-10-CM: J18.9 ICD-9-CM: 438  2/13/2019 - Present Unknown [facility] ICD-10-CM: A41.9 ICD-9-CM: 038.9, 995.91  2/13/2019 - Present Unknown * (Principal) Syncope and collapse ICD-10-CM: R55 
ICD-9-CM: 780.2  2/13/2019 - Present Yes Diarrhea ICD-10-CM: R19.7 ICD-9-CM: 787.91  2/13/2019 - Present Yes Edema ICD-10-CM: R60.9 ICD-9-CM: 782.3  2/13/2019 - Present Yes Pleural effusion ICD-10-CM: J90 ICD-9-CM: 511.9  2/13/2019 - Present Yes Atrial fibrillation with rapid ventricular response (HCC) ICD-10-CM: I48.91 
ICD-9-CM: 427.31  4/27/2018 - Present Yes Systolic CHF, acute on chronic (HCC) (Chronic) ICD-10-CM: H32.13 ICD-9-CM: 428.23, 428.0  4/27/2018 - Present Yes Solitary right kidney (Chronic) ICD-10-CM: Q60.0 ICD-9-CM: 753.0  4/27/2018 - Present Yes S/P lobectomy of lung (Chronic) ICD-10-CM: Z90.2 ICD-9-CM: V45.89  4/27/2018 - Present Yes PLAN:   
Syncope - sec to symptomatic anemia 
pna - cont zosyn Effusion- multifactorial chf and PEM(decrease albumin) 
afib with rvr- on ccb and digoxin-- h/o allergic reaction to beta blockers Heme occult positive- pt for egs today DC planning/Dispo: DVT ppx:  scd Signed: 
Mo Martinez MD

## 2019-02-19 NOTE — ANESTHESIA PREPROCEDURE EVALUATION
Anesthetic History Review of Systems / Medical History Patient summary reviewed Pulmonary Smoker (Former) Asthma Comments: S/p pulmonary resection for Tumor Neuro/Psych Psychiatric history (Depression) Cardiovascular Hypertension Valvular problems/murmurs (mod): mitral insufficiency Dysrhythmias : atrial fibrillation CAD Exercise tolerance: <4 METS Comments: Echo 2/2019 SUMMARY: 
 
-  Left ventricle: Systolic function was moderately reduced. Ejection  
fraction 
was estimated in the range of 35 % to 40 %. This study was inadequate for the 
evaluation of regional wall motion due to atrial fibrillation. -  Left atrium: The atrium was markedly dilated. -  Right atrium: The atrium was markedly dilated. -  Mitral valve: There was moderate regurgitation. -  Tricuspid valve: There was mild regurgitation. GI/Hepatic/Renal 
  
 
 
Renal disease (s/p nephrectomy for tumor) PUD Endo/Other Other Findings Physical Exam 
 
Airway Mallampati: III 
TM Distance: > 6 cm Neck ROM: decreased range of motion Mouth opening: Diminished (comment) Cardiovascular Rhythm: irregular Dental 
 
Dentition: Edentulous Pulmonary Decreased breath sounds: bilateral 
 
 
 
 
 Abdominal 
 
 
 
 Other Findings Anesthetic Plan ASA: 3 Anesthesia type: total IV anesthesia Anesthetic plan and risks discussed with: Patient

## 2019-02-19 NOTE — PROGRESS NOTES
TRANSFER - IN REPORT: 
 
Verbal report received from Severino(name) on Marie Form  being received from Neil(unit) for ordered procedure Report consisted of patients Situation, Background, Assessment and  
Recommendations(SBAR). Information from the following report(s) SBAR was reviewed with the receiving nurse. Opportunity for questions and clarification was provided. Assessment completed upon patients arrival to unit and care assumed.

## 2019-02-19 NOTE — PROGRESS NOTES
[facility] ambulance transport here to transport patient back to [address] [place] [address]. Pt stable at time of transfer with latest vital signs documented.

## 2019-02-19 NOTE — PROGRESS NOTES
Spoke to [facility] regarding CT A/P that Dr Lisbeth Borden ordered while patient was DT. Explained that we would need an order put in now that patient is back at ES. The patient also needs IV hydration due to GFR of 51.

## 2019-02-19 NOTE — ANESTHESIA POSTPROCEDURE EVALUATION
Procedure(s): ESOPHAGOGASTRODUODENOSCOPY (EGD) Needs  scope/ES  ESOPHAGOGASTRODUODENAL (EGD) BIOPSY. Anesthesia Post Evaluation Patient location during evaluation: PACU Patient participation: complete - patient participated Level of consciousness: awake and alert Pain management: adequate Airway patency: patent Anesthetic complications: no 
Cardiovascular status: acceptable Respiratory status: acceptable Hydration status: acceptable Post anesthesia nausea and vomiting:  none Visit Vitals /71 Pulse 83 Temp 36 °C (96.8 °F) Resp 15 SpO2 97%

## 2019-02-19 NOTE — PROGRESS NOTES
TRANSFER - OUT REPORT: 
 
Verbal report given to REILLY Huddleston(name) on Agustina Berman  being transferred to GI(unit) for routine progression of care Report consisted of patients Situation, Background, Assessment and  
Recommendations(SBAR). Information from the following report(s) SBAR, Kardex, [facility] and Recent Results was reviewed with the receiving nurse. Lines:  
Peripheral IV 02/13/19 Right External jugular (Active) Site Assessment Clean, dry, & intact 2/18/2019  2:52 PM  
Phlebitis Assessment 0 2/18/2019  2:52 PM  
Infiltration Assessment 0 2/18/2019  2:52 PM  
Dressing Status Clean, dry, & intact 2/18/2019  2:52 PM  
Dressing Type Tape;Transparent 2/18/2019  2:52 PM  
Hub Color/Line Status Infusing 2/18/2019  2:52 PM  
  
 
Opportunity for questions and clarification was provided. Patient transported with: 
 [facility]

## 2019-02-19 NOTE — PROGRESS NOTES
Shift assessment complete via flowsheet. Bed low and locked, call light within reach. IV clean, dry, intact, infusing per orders. No needs at this time. Will continue to monitor.

## 2019-02-20 PROBLEM — R16.0 LIVER MASS: Status: ACTIVE | Noted: 2019-01-01

## 2019-02-20 PROBLEM — K63.9 CECAL LESION: Status: ACTIVE | Noted: 2019-01-01

## 2019-02-20 NOTE — PROGRESS NOTES
Talked to Wayne Moreno in [address], patient finished contrast dye. Transport will come transport pt for CT.

## 2019-02-20 NOTE — PROGRESS NOTES
Shift Assessment - Patient is alert and oriented x4. No complaint of pain at this time. Patient is up with assist x1 to [facility]. Resting in low, locked bed. Call light in reach. Daughter is at bedside.

## 2019-02-20 NOTE — PROGRESS NOTES
Hospitalist Progress Note Admit Date:  2019 11:29 AM  
Name:  Ignacio Owens Age:  80 y.o. 
:  1932 MRN:  [id number] PCP:  Constanza, Not On File Treatment Team: Attending Provider: Kayli Walker MD; Care Manager: Davina De Anda Consulting Provider: TRACIE Rand; Utilization Review: Celia Davis RN; Utilization Review: Constantine Valentino RN; Consulting Provider: Rose Mendosa MD; Consulting Provider: Diego Wang MD 
 
Subjective:  
80yr old female with pmhx sig for chf, htn, asthma. kidney cancer s/p resection, hx of a lung nodule - not known to be cancerous but s/p pneumonectomy. The patient states she has been unwell for months, with diarrhea for months. .. Last admitted to hospital in [address] [place] [address] in December after passing out at the [employer], she was given blood, scheduled for colonoscopy but poor prep so it was not done. She passed at her home in [address] [place] [address] on Saturday- daughter went to get her and brought her [place]. ... She wanted to go to the bank today. . But needed to use the bathroom on the way. . They stopped at [facility]-- the pt had a bm and passed out at the sink while washing her hands. .. Ems - called. .. In the er cxr concerning for pneumonia; lactic acid elevated. hospitalist asked to admit\" 
  
  
The patient was admitted. She was started on abx for the pneumonia. She was also transfused 2 units of PRBCS. She was seen by GI with plans for egd and colonoscopy once stable from a cardiology and resp point of view. Cardiology was consulted automatically in the admitting order set and have signed off. She has felt improved with diuresis and the blood. Her major concern continues the to be the diarrhea of unclear etiology. Cdiff is negative. She has no evidence of dig toxicity. She is allergic to BB with a history of swelling so these were not started.  Plan si for egd this week with gi- they have not decided on imaging for her altered anatomy or not at this time. 2/19/19 Pt going for egd  Today Daughter at bedside No complaints 2/20/19 Had egd yesterday GI ordered ct abd pelvis with contrast 
Pt says she is hungry otherwise doing ok Objective:  
 
Patient Vitals for the past 24 hrs: 
 Temp Pulse Resp BP SpO2  
02/20/19 1203 97.8 °F (36.6 °C) 82 18 111/75 95 % 02/20/19 0800 97.9 °F (36.6 °C) 82 18 116/71 95 % 02/20/19 0349 97.5 °F (36.4 °C) 77 16 115/63 96 % 02/19/19 2324 97.6 °F (36.4 °C) 60 17 110/71 96 % 02/19/19 1928 98 °F (36.7 °C) 73 16 109/65 97 % 02/19/19 1538 96 °F (35.6 °C) 85 16 116/65 98 % Oxygen Therapy O2 Sat (%): 95 % (02/20/19 1203) Pulse via Oximetry: 82 beats per minute (02/14/19 0612) O2 Device: Room air (02/18/19 2022) No intake or output data in the 24 hours ending 02/20/19 1452 General:    Well nourished. Alert.  
heent- normal - blind left eye CV:   RRR. No murmur, rub, or gallop. Lungs:   Decrease air entry basal 
Abdomen:   Soft, nontender, nondistended. Obese Cns- no focal neurological deficits Extremities: Warm and dry. No cyanosis or edema. Skin:     No rashes or jaundice. Data Review: 
I have reviewed all labs, meds, telemetry events, and studies from the last 24 hours. Recent Results (from the past 24 hour(s)) MAGNESIUM Collection Time: 02/20/19  5:09 AM  
Result Value Ref Range Magnesium 2.0 1.8 - 2.4 mg/dL CBC WITH AUTOMATED DIFF Collection Time: 02/20/19  5:09 AM  
Result Value Ref Range WBC 11.5 (H) 4.3 - 11.1 K/uL  
 RBC 4.38 4.05 - 5.2 M/uL  
 HGB 11.5 (L) 11.7 - 15.4 g/dL HCT 38.2 35.8 - 46.3 % MCV 87.2 79.6 - 97.8 FL  
 MCH 26.3 26.1 - 32.9 PG  
 MCHC 30.1 (L) 31.4 - 35.0 g/dL  
 RDW 19.7 (H) 11.9 - 14.6 % PLATELET 314 062 - 670 K/uL MPV 10.5 9.4 - 12.3 FL ABSOLUTE NRBC 0.00 0.0 - 0.2 K/uL  
 DF AUTOMATED NEUTROPHILS 79 (H) 43 - 78 % LYMPHOCYTES 13 13 - 44 % MONOCYTES 5 4.0 - 12.0 % EOSINOPHILS 2 0.5 - 7.8 % BASOPHILS 1 0.0 - 2.0 % IMMATURE GRANULOCYTES 1 0.0 - 5.0 %  
 ABS. NEUTROPHILS 9.1 (H) 1.7 - 8.2 K/UL  
 ABS. LYMPHOCYTES 1.5 0.5 - 4.6 K/UL  
 ABS. MONOCYTES 0.6 0.1 - 1.3 K/UL  
 ABS. EOSINOPHILS 0.3 0.0 - 0.8 K/UL  
 ABS. BASOPHILS 0.1 0.0 - 0.2 K/UL  
 ABS. IMM. GRANS. 0.1 0.0 - 0.5 K/UL METABOLIC PANEL, BASIC Collection Time: 02/20/19  5:09 AM  
Result Value Ref Range Sodium 143 136 - 145 mmol/L Potassium 3.9 3.5 - 5.1 mmol/L Chloride 111 (H) 98 - 107 mmol/L  
 CO2 23 21 - 32 mmol/L Anion gap 9 mmol/L Glucose 54 (L) 65 - 100 mg/dL BUN 12 8 - 23 MG/DL Creatinine 1.30 (H) 0.6 - 1.0 MG/DL  
 GFR est AA 50 (L) >60 ml/min/1.73m2 GFR est non-AA 41 ml/min/1.73m2 Calcium 7.8 (L) 8.3 - 10.4 MG/DL  
BNP Collection Time: 02/20/19  5:09 AM  
Result Value Ref Range  pg/mL All Micro Results Procedure Component Value Units Date/Time CULTURE, BLOOD [[id number]] Collected:  02/13/19 1644 Order Status:  Completed Specimen:  Blood Updated:  02/18/19 1532 Special Requests: --     
  RIGHT JUGULAR VEIN Culture result: NO GROWTH 5 DAYS     
 CULTURE, BLOOD [[id number]] Collected:  02/13/19 1647 Order Status:  Completed Specimen:  Blood Updated:  02/18/19 1532 Special Requests: --     
  RIGHT 
ARM Culture result: NO GROWTH 5 DAYS     
 CULTURE, BLOOD [[id number]] Collected:  02/13/19 2103 Order Status:  Completed Specimen:  Blood Updated:  02/18/19 1532 Special Requests: --     
  RIGHT JUGULAR VEIN Culture result: NO GROWTH 5 DAYS     
 CULTURE, STOOL [[id number]] Collected:  02/14/19 1901 Order Status:  Completed Specimen:  Stool Updated:  02/17/19 0432 Special Requests: NO SPECIAL REQUESTS Culture result:    
  No Salmonella, Shigella, or Ecoli 0157 isolated. CULTURE, URINE [[id number]] Collected:  02/13/19 1556 Order Status:  Completed Specimen:  Urine Updated:  02/16/19 6078 Special Requests: NO SPECIAL REQUESTS Culture result: NO GROWTH 2 DAYS C. DIFFICILE AG & TOXIN A/B [[id number]] Collected:  02/14/19 1900 Order Status:  Completed Specimen:  Stool Updated:  02/15/19 1424 [id number] ANTIGEN    
  C. DIFFICILE GDH ANTIGEN-NEGATIVE  
     
  C. difficile toxin C. DIFFICILE TOXIN-NEGATIVE  
     
  PCR Reflex NOT APPLICABLE INTERPRETATION    
  NEGATIVE FOR TOXIGENIC C. DIFFICILE Clinical Consideration NEGATIVE FOR TOXIGENIC C. DIFFICILE  
     
 C. DIFFICILE AG & TOXIN A/B [[id number]] Collected:  02/14/19 1901 Order Status:  Canceled Specimen:  Stool Updated:  02/14/19 1929 Current Meds: 
Current Facility-Administered Medications Medication Dose Route Frequency  dextrose 40% (GLUTOSE) oral gel 1 Tube  15 g Oral PRN  
 glucagon (GLUCAGEN) injection 1 mg  1 mg IntraMUSCular PRN  
 dextrose (D50W) injection syrg 12.5-25 g  25-50 mL IntraVENous PRN  
 furosemide (LASIX) tablet 40 mg  40 mg Oral DAILY  bisacodyl (DULCOLAX) tablet 10 mg  10 mg Oral NOW  polyethylene glycol (MIRALAX) powder 238 g  238 g Oral ONCE  
 0.9% sodium chloride infusion  1 mL/kg/hr IntraVENous PRN  
 sodium chloride (NS) flush 5-10 mL  5-10 mL IntraVENous PRN  
 oxyCODONE IR (ROXICODONE) tablet 5 mg  5 mg Oral Q6H PRN  
 ondansetron (ZOFRAN) injection 4 mg  4 mg IntraVENous Q4H PRN  
 acetaminophen (TYLENOL) tablet 650 mg  650 mg Oral Q6H PRN  pantoprazole (PROTONIX) tablet 40 mg  40 mg Oral ACB&D  
 magnesium oxide (MAG-OX) tablet 400 mg  400 mg Oral BID  dilTIAZem CD (CARDIZEM CD) capsule 120 mg  120 mg Oral DAILY  digoxin (LANOXIN) tablet 0.125 mg  0.125 mg Oral DAILY  aspirin chewable tablet 81 mg  81 mg Oral DAILY  piperacillin-tazobactam (ZOSYN) 4.5 g in 0.9% sodium chloride (MBP/ADV) 100 mL  4.5 g IntraVENous Q12H Other Studies (last 24 hours): 
Ct Abd Pelv W Cont Addendum Date: 2/20/2019 Addendum: Addendum: On second review, there appears to be a colocolonic intussusception involving the proximal transverse large bowel. The lead point might be a cecal mass. The referring physician was aware of these findings prior to this addendum. In light of this, the liver dome lesion may represent metastatic disease. Result Date: 2/20/2019 CT of the Abdomen and Pelvis with contrast INDICATION:  Weight loss. Gastroesophageal reflux. Elevated CEA COMPARISON: None TECHNIQUE: Multiple axial images were obtained through the abdomen and pelvis after intravenous injection of 100 mL Isovue 370. Oral contrast was administered. Coronal reformatted images were provided. Contrast necessary to increase sensitivity for neoplasia and infection. Radiation dose reduction techniques were used for this study:  Our CT scanners use one or all of the following: Automated exposure control, adjustment of the mA and/or kVp according to patient's size, iterative reconstruction. FINDINGS: Abdomen CT:  Mediastinal shift to the right there is a moderate-sized simple left pleural effusion. The left lung shows dependent atelectasis. The remainder of the included left lung is hyperexpanded. There is a peripherally enhancing at least moderate right pleural fluid collection. The patient is status post right pneumonectomy. Coronary artery calcifications. There is diffuse anasarca. No discrete splenic abnormality. There are few hypoattenuating scattered liver cysts. Peripherally dense 2.2 cm liver dome mass with adjacent hypervascular wedge-shaped low artifact. Large amount of abdominal ascites. Gallbladder distention without gallstones. Right adrenal gland is normal. Couple hypoattenuating right upper pole small renal cysts. Left kidney and left adrenal gland are not seen. Pancreas is normal. Abdominal aorta is atherosclerotic with atherosclerotic major branch vessels.  Diffuse anasarca. CT PELVIS: Urinary bladder is normal. Colonic diverticulosis without diverticulitis. Large volume pelvic free fluid. Subcutaneous air compatible with medication injection site is left of midline. The bones show no acute or aggressive lesions. IMPRESSION: Peripherally dense liver dome 2.2 cm mass with adjacent hypervascularity. The peripheral density may represent calcification or contrast enhancement. Noncontrast imaging would be beneficial. HCC is possible. 2. Large volume ascites with anasarca. Large left pleural effusion. Right pleural fluid related to right pneumonectomy. 3. Distended gallbladder. 4. Absent left kidney. 5. Colonic diverticulosis. Assessment and Plan:  
 
Hospital Problems as of 2/20/2019 Never Reviewed Codes Class Noted - Resolved POA Liver mass ICD-10-CM: R16.0 ICD-9-CM: 573.9  2/20/2019 - Present Unknown Cecal lesion ICD-10-CM: K63.9 ICD-9-CM: 569.89  2/20/2019 - Present Unknown Pneumonia ICD-10-CM: J18.9 ICD-9-CM: 261  2/13/2019 - Present Unknown Sepsis ([address] ICD-10-CM: A41.9 ICD-9-CM: 038.9, 995.91  2/13/2019 - Present Unknown * (Principal) Syncope and collapse ICD-10-CM: R55 
ICD-9-CM: 780.2  2/13/2019 - Present Yes Diarrhea ICD-10-CM: R19.7 ICD-9-CM: 787.91  2/13/2019 - Present Yes Edema ICD-10-CM: R60.9 ICD-9-CM: 782.3  2/13/2019 - Present Yes Pleural effusion ICD-10-CM: J90 ICD-9-CM: 511.9  2/13/2019 - Present Yes Atrial fibrillation with rapid ventricular response (HCC) ICD-10-CM: I48.91 
ICD-9-CM: 427.31  4/27/2018 - Present Yes Systolic CHF, acute on chronic (HCC) (Chronic) ICD-10-CM: A24.56 ICD-9-CM: 428.23, 428.0  4/27/2018 - Present Yes Solitary right kidney (Chronic) ICD-10-CM: Q60.0 ICD-9-CM: 753.0  4/27/2018 - Present Yes S/P lobectomy of lung (Chronic) ICD-10-CM: Z90.2 ICD-9-CM: V45.89  4/27/2018 - Present Yes PLAN:   
 Syncope - sec to symptomatic anemia 
pna - cont zosyn Effusion- multifactorial chf and PEM(decrease albumin) 
afib with rvr- on ccb and digoxin-- h/o allergic reaction to beta blockers Heme occult positive- pt for egs today 
ckd 3 Ct showed bilateral pleural effusion, ascites and liver and cecal mass. GI for probable coloscopy tomorrow. DC planning/Dispo: DVT ppx:  scd Signed: 
Fer Enamorado MD

## 2019-02-20 NOTE — PROGRESS NOTES
Problem: Interdisciplinary Rounds Goal: Interdisciplinary Rounds Interdisciplinary team rounds were held 2/20/2019 with the following team members:Care Management, Nursing, Nutrition, Pharmacy, Physical Therapy and Physician. Plan of care discussed. See clinical pathway and/or care plan for interventions and desired outcomes.

## 2019-02-20 NOTE — PROGRESS NOTES
Problem: Nutrition Deficit Goal: *Optimize nutritional status Nutrition Follow Up: 
Reason for initial assessment: Consult received for General Nutrition Management and Supplements 2/15: Dr. Merrill Flynn Referral received from nursing admission Malnutrition Screening Tool for recently lost 14-23# without trying and eating poorly due to chewing problems. Assessment:  
Diet order(s): 2/20-2/13:  Clear Liquids/NPO; 1 meal with full liquids (lunch 2/20) Pt presents with syncope.  Past medical history notable for AFib, CHF, HTN, CKD-stage 3, kidney cancer s/p resection, pneumonectomy, chronic diarrhea past few months with 6-7 loose stools/day; pt on chronic lasix. Pt resting in bed; tolerating clears-has been drinking the Ensure Clears; enjoyed the full liquid at lunch today; ate the cream soup and ice cream.  Pt had CT of the abdomen and pelvis on 2/19; scheduled for additional testing 2/21. Diarrhea improving-1 loose brown stool documented today. Anthropometrics:Height: 5' 5.5\" (166.4 cm),  Weight: 52.4 kg (115 lb 9.6 oz), Weight Source: Bed, Body mass index is 18.94 kg/m². BMI class of underweight for Older American Women. Weight:  2/19:  51.4 kg- weight source not specified Weight history per EMR:  RD not able to determine if weights are actual vs stated due to functionality of [facility]. WT / BMI WEIGHT BODY MASS INDEX  
2/19/2019 113 lb 6.4 oz 18.58 kg/m2 4/28/2018 124 lb 1.6 oz   
4/27/2018  20.34 kg/m2 Pt meets ASPEN MALNUTRITION CRITERIA for Acute Illness with energy intake of < 50% estimated energy needs past 6 days and weight loss of 6% past week. Macronutrient Needs: EER:  0139-6813 kcal /day (30-35 kcal/kg BW) EPR:  47-52 grams protein/day (0.9-1 grams/kg BW) GFR 50 Intake/Comparative Standards:  Clear liquid diet does not meet estimated kcal or protein needs.  
  
Intervention: 
Meals and snacks: Continue current diet.  Advance per MD recommendations as medical condition dictates. Recommend nutrition support if pt unable to tolerate diet advancement to full liquids or > within the next 2-3 days. Nutrition Supplement Therapy: Initiated Ensure Clear all meals while on clear liquids Discharge nutrition plan: Too soon to determine.  
Coordination of Nutrition Care:  Interdisciplinary Rounds, Dr. Clementina Dakins, MPH, [facility],  
[phone]

## 2019-02-20 NOTE — PROGRESS NOTES
Problem: Mobility Impaired (Adult and Pediatric) Goal: *Acute Goals and Plan of Care (Insert Text) STG: 
(1.)Ms. Dane Suggs will move from supine to sit and sit to supine  with SUPERVISION within 5 treatment day(s). (2.)Ms. Dane Suggs will transfer from bed to chair and chair to bed with SUPERVISION using the least restrictive device within 5 treatment day(s). (3.)Ms. Dane Suggs will ambulate with SUPERVISION for 200 feet with the least restrictive device within 5 treatment day(s). (4.)Pt. Will increase B LE strength 1/2 grade within 5 days 
 
 
________________________________________________________________________________________________ PHYSICAL THERAPY: Daily Note and AM 2/20/2019INPATIENT: PT Visit Days : 4 [place] / [place] / Product Type: Medicare /   
  
NAME/AGE/GENDER: Katelin Paul is a 80 y.o. female PRIMARY DIAGNOSIS: Pneumonia [J18.9] Sepsis ([facility] [address] [A41.9] Syncope and collapse Syncope and collapse ICD-10: Treatment Diagnosis:  
 · Generalized Muscle Weakness (M62.81) · Other lack of cordination (R27.8) · Other abnormalities of gait and mobility (R26.89) Precaution/Allergies: 
Atenolol ASSESSMENT:  
Ms. Dane Suggs supine upon contact. She performs exercises in the bed without any problems. She works on bed mobility as follows: supine>EOB with Min A- CGA. She work on sit to stand x 2, then took steps  HOB. She stated I am getting  tired. Therapist help pt lay back down. She remain in supine with call light near. This section established at most recent assessment PROBLEM LIST (Impairments causing functional limitations): 1. Decreased Strength 2. Decreased ADL/Functional Activities 3. Decreased Transfer Abilities 4. Decreased Ambulation Ability/Technique 5. Decreased Balance 6. Decreased Activity Tolerance 7. Decreased Work Simplification/Energy Conservation Techniques 8. Decreased Flexibility/Joint Mobility 9. Decreased Watson with Home Exercise Program 
 INTERVENTIONS PLANNED: (Benefits and precautions of physical therapy have been discussed with the patient.) 1. Balance Exercise 2. Bed Mobility 3. Family Education 4. Gait Training 5. Home Exercise Program (HEP) 6. Range of Motion (ROM) 7. Therapeutic Activites 8. Therapeutic Exercise/Strengthening 9. Transfer Training 10. endurance activities TREATMENT PLAN: Frequency/Duration: daily for duration of hospital stay Rehabilitation Potential For Stated Goals: Fair RECOMMENDED REHABILITATION/EQUIPMENT: (at time of discharge pending progress): Due to the probability of continued deficits (see above) this patient will likely need continued skilled physical therapy after discharge. Equipment:  
? uses a rollator at home HISTORY:  
History of Present Injury/Illness (Reason for Referral): Admitted after syncopal epeisode Past Medical History/Comorbidities: Ms. Sima Bone  has a past medical history of Arthritis, Asthma, CAD (coronary artery disease), Cancer ([facility] [address] Chronic kidney disease, Heart failure ([facility] [address] Hypertension, Psychiatric disorder, and PUD (peptic ulcer disease). Ms. Sima Bone  has a past surgical history that includes hx urological; hx other surgical; and hx heent. Social History/Living Environment:  
Home Environment: Apartment(3rd floor apt. Uses elevator) # Steps to Enter: 0 One/Two Story Residence: One story Living Alone: Yes Support Systems: (grand son is around part of the time) Patient Expects to be Discharged to[de-identified] (uncertain whether she'll go back to stay with dtr while awaiting going back to [place]) Current DME Used/Available at Home: [employer]ette Severe, rollator Tub or Shower Type: Shower Prior Level of Function/Work/Activity: 
Lives in an apt in [place] and uses rollator. Performs ADLs independently Dominant Side:  
      RIGHT Number of Personal Factors/Comorbidities that affect the Plan of Care: 1-2: MODERATE COMPLEXITY EXAMINATION:  
Most Recent Physical Functioning:  
Gross Assessment: 
  
         
  
Posture: 
  
Balance: 
  Bed Mobility: 
Supine to Sit: Minimum assistance Sit to Supine: Minimum assistance Wheelchair Mobility: 
  
Transfers: 
Sit to Stand: Minimum assistance Stand to Sit: Contact guard assistance Bed to Chair: Contact guard assistance Duration: 30 Minutes Gait: 
  
Base of Support: Narrowed Speed/Gemini: Pace decreased (<100 feet/min) Gait Abnormalities: Decreased step clearance Distance (ft): 6 Feet (ft)(steps to [facility]) Assistive Device: Other (comment)(HHA) Ambulation - Level of Assistance: Stand-by assistance Interventions: Safety awareness training Body Structures Involved: 1. Joints 2. Muscles Body Functions Affected: 1. Movement Related Activities and Participation Affected: 1. General Tasks and Demands 2. Mobility 3. Self Care Number of elements that affect the Plan of Care: 4+: HIGH COMPLEXITY CLINICAL PRESENTATION:  
Presentation: Stable and uncomplicated: LOW COMPLEXITY CLINICAL DECISION MAKING:  
[employer] AM-PAC 6 Clicks Basic Mobility Inpatient Short Form How much difficulty does the patient currently have. .. Unable A Lot A Little None 1. Turning over in bed (including adjusting bedclothes, sheets and blankets)? [] 1   [] 2   [x] 3   [] 4  
2. Sitting down on and standing up from a chair with arms ( e.g., wheelchair, bedside commode, etc.)   [] 1   [] 2   [x] 3   [] 4  
3. Moving from lying on back to sitting on the side of the bed? [] 1   [] 2   [x] 3   [] 4 How much help from another person does the patient currently need. .. Total A Lot A Little None 4. Moving to and from a bed to a chair (including a wheelchair)? [] 1   [] 2   [x] 3   [] 4  
5. Need to walk in hospital room? [] 1   [] 2   [x] 3   [] 4  
6. Climbing 3-5 steps with a railing?    [] 1   [x] 2   [] 3   [] 4  
 © 2007, Trustees of [address] [place] [address] [address] [address], under license to [employer]. All rights reserved Score:  Initial: 17 Most Recent: X (Date: -- ) Interpretation of Tool:  Represents activities that are increasingly more difficult (i.e. Bed mobility, Transfers, Gait). Medical Necessity:    
· Patient demonstrates good rehab potential due to higher previous functional level. Reason for Services/Other Comments: 
· Patient continues to require present interventions due to patient's inability to perform functional mobility independently. Use of outcome tool(s) and clinical judgement create a POC that gives a: Clear prediction of patient's progress: LOW COMPLEXITY  
  
 
 
 
TREATMENT:  
(In addition to Assessment/Re-Assessment sessions the following treatments were rendered) Pre-treatment Symptoms/Complaints:  diarrhea Pain: Initial: not reporting any pain Pain Intensity 1: 0  Post Session:    
 
Therapeutic Activity: (  30 Minutes ):  Therapeutic activities including Bed transfers, Toilet transfers, Ambulation on level ground to improve mobility, strength, balance and coordination. Required minimal Safety awareness training to promote static and dynamic balance in standing. Date: 
2/15/19 Date: 
2/18 Date: 
2/20 Activity/Exercise Parameters Parameters Parameters SITTING: ankle pumps 10 12 Hip flex 10 LAQ 10 Shldr flex 10 Elbow flex/ext 10 Quad sets  12 12  
heelslides  12 12 Hip abd/add  12 12 SAQ  12 12 All exercises B Braces/Orthotics/Lines/Etc:  
· IV 
· O2 Device: Room air Treatment/Session Assessment:   
· Response to Treatment:  Making slow and steady progress · Interdisciplinary Collaboration:  
o Registered Nurse · After treatment position/precautions:  
o Supine in bed 
o Bed/Chair-wheels locked 
o Call light within reach · Compliance with Program/Exercises: Will assess as treatment progresses Recommendations/Intent for next treatment session: \"Next visit will focus continue to work on exercises to get stronger, and work on gait distance Total Treatment Duration: PT Patient Time In/Time Out Time In: 1100 Time Out: 1130 Eva Garcia, PTA

## 2019-02-20 NOTE — PROGRESS NOTES
GI DAILY PROGRESS NOTE Admit Date:  2/13/2019 Today's Date:  2/20/2019 CC:  Toño Colon, weight loss Subjective:  
 
Patient with continued diarrhea. Agreeable to colonoscopy tomorrow after results of CT scan reviewed. Daughter at bedside and is also agreeable. Esophagogastroduodenoscopy DATE of PROCEDURE: 2/19/2019 FINDINGS: 
ESOPHAGUS:  The esophagus was intubated carefully and was noted to be tortuous in the upper esophageal area. There is a 3cm hiatal hernia noted with widely patent GE junction. On withdrawal of the scope it was noted that there was mucosal tear at 15cm consistent with dilation of a benign stricture. Further dilation would not be safe given extent of tear with just EGD scope. No significant bleeding noted. STOMACH:  Normal and biopsies taken for h pylori DUODENUM: Normal  
Estimated blood loss: 0-minimal   PLAN: 
1. Advance to full liquids and monitor weight in 4 to 6 weeks. Advance to soft diet in 24 hours 2. Patient would not be able to prep for and withstand a colonoscopy in my opinion and EGD findings are enough to explain weight loss and GERD complaints. However CEA was checked and is 97 -  Proceed with CT Scan abd/pelvis with IV and PO contrast. 
3. PPI 40mg once daily 4. Will schedule office visit in 4 weeks.  
Wilfredo Hyman MD 
 
CT of the Abdomen and Pelvis with contrast 2/20/19  
INDICATION:  Weight loss. Gastroesophageal reflux. Elevated CEA FINDINGS: 
Abdomen CT:  Mediastinal shift to the right there is a moderate-sized simple 
left pleural effusion. The left lung shows dependent atelectasis. The remainder 
of the included left lung is hyperexpanded. There is a peripherally enhancing at 
least moderate right pleural fluid collection. The patient is status post right 
pneumonectomy. Coronary artery calcifications. There is diffuse anasarca. No 
discrete splenic abnormality. There are few hypoattenuating scattered liver cysts. Peripherally dense 2.2 cm liver dome mass with adjacent hypervascular 
wedge-shaped low artifact. Large amount of abdominal ascites. Gallbladder 
distention without gallstones. Right adrenal gland is normal. Couple 
hypoattenuating right upper pole small renal cysts. Left kidney and left adrenal 
gland are not seen. Pancreas is normal. Abdominal aorta is atherosclerotic with 
atherosclerotic major branch vessels. Diffuse anasarca.   
CT PELVIS: Urinary bladder is normal. Colonic diverticulosis without 
diverticulitis. Large volume pelvic free fluid. Subcutaneous air compatible with 
medication injection site is left of midline. The bones show no acute or 
aggressive lesions.   
IMPRESSION IMPRESSION: Peripherally dense liver dome 2.2 cm mass with adjacent 
hypervascularity. The peripheral density may represent calcification or contrast 
enhancement. Noncontrast imaging would be beneficial. HCC is possible. 2. Large volume ascites with anasarca. Large left pleural effusion. Right 
pleural fluid related to right pneumonectomy. 3. Distended gallbladder. 4. Absent left kidney. 5. Colonic diverticulosis. Addendum: Addendum: On second review, there appears to be a colocolonic 
intussusception involving the proximal transverse large bowel. The lead point 
might be a cecal mass. The referring physician was aware of these findings prior 
to this addendum. In light of this, the liver dome lesion may represent 
metastatic disease. Medications:  
Current Facility-Administered Medications Medication Dose Route Frequency  dextrose 40% (GLUTOSE) oral gel 1 Tube  15 g Oral PRN  
 glucagon (GLUCAGEN) injection 1 mg  1 mg IntraMUSCular PRN  
 dextrose (D50W) injection syrg 12.5-25 g  25-50 mL IntraVENous PRN  
 furosemide (LASIX) tablet 40 mg  40 mg Oral DAILY  0.9% sodium chloride infusion  1 mL/kg/hr IntraVENous PRN  
 sodium chloride (NS) flush 5-10 mL  5-10 mL IntraVENous PRN  
  oxyCODONE IR (ROXICODONE) tablet 5 mg  5 mg Oral Q6H PRN  
 ondansetron (ZOFRAN) injection 4 mg  4 mg IntraVENous Q4H PRN  
 acetaminophen (TYLENOL) tablet 650 mg  650 mg Oral Q6H PRN  pantoprazole (PROTONIX) tablet 40 mg  40 mg Oral ACB&D  
 magnesium oxide (MAG-OX) tablet 400 mg  400 mg Oral BID  dilTIAZem CD (CARDIZEM CD) capsule 120 mg  120 mg Oral DAILY  digoxin (LANOXIN) tablet 0.125 mg  0.125 mg Oral DAILY  aspirin chewable tablet 81 mg  81 mg Oral DAILY  piperacillin-tazobactam (ZOSYN) 4.5 g in 0.9% sodium chloride (MBP/ADV) 100 mL  4.5 g IntraVENous Q12H Review of Systems: ROS was obtained, with pertinent positives as listed above. No chest pain or SOB. Diet:   
 
Objective:  
Vitals: 
Visit Vitals /75 (BP 1 Location: Left arm, BP Patient Position: At rest) Pulse 82 Temp 97.8 °F (36.6 °C) Resp 18 Ht 5' 5.5\" (1.664 m) Wt 51.4 kg (113 lb 6.4 oz) SpO2 95% Breastfeeding? No  
BMI 18.58 kg/m² Intake/Output: 
No intake/output data recorded. 02/18 1901 - 02/20 0700 In: 250 [I.V.:250] Out: 300 [Urine:300] Exam: 
General appearance: alert, cooperative, no distress Lungs: clear to auscultation bilaterally anteriorly Heart: regular rate and rhythm Abdomen: soft, non-tender. Bowel sounds normal. No masses, no organomegaly Extremities: extremities normal, atraumatic, no cyanosis or edema Neuro:  alert and oriented Data Review (Labs):   
Recent Labs  
  02/20/19 
0927 02/19/19 
0601 02/18/19 
0554 02/17/19 
1751 WBC 11.5*  --  11.7*  --   
HGB 11.5*  --  11.2* 11.5* HCT 38.2  --  37.8 37.7   --  232  -- MCV 87.2  --  90.9  --   
 143 144  --   
K 3.9 3.6 4.0  --   
* 112* 113*  --   
CO2 23 23 20*  --   
BUN 12 11 14  --   
CREA 1.30* 1.28* 1.41*  --   
CA 7.8* 7.6* 7.4*  --   
MG 2.0 2.1 1.2*  --   
GLU 54* 65 58*  --   
 
 
Assessment:  
 
Principal Problem: 
  Syncope and collapse (2/13/2019) Active Problems: Atrial fibrillation with rapid ventricular response ([address] (4/27/2018) Systolic CHF, acute on chronic ([address] (4/27/2018) Solitary right kidney (4/27/2018) S/P lobectomy of lung (4/27/2018) Pneumonia (2/13/2019) Sepsis ([address] (2/13/2019) Diarrhea (2/13/2019) Edema (2/13/2019) Pleural effusion (2/13/2019) 79 y/o female with h/o CHF (EF 35-40% per recent ECHO), moderate MR, mild TR, HTN, asthma, kidney cancer s/p resection 2016, a. Fib, hx lung nodule s/p pneumonectomy admitted with syncope, diagnosed with pneumonia, elevated lactate and volume overload and seen by GI for abdominal pain, diarrhea (x 3 months), weight loss and drop in hemoglobin with normocytic anemia. Iron studies are not consistent with iron deficiency.  Stool culture and c. Diff negative. Received 2 units PRBCs.  Heme positive stool. EGD 2/19 with no source for anemia or weight loss found. CEA elevated at 97. CT abd/pelvis 2/20 with peripherally dense liver dome 2.2 cm mass with adjacent Hypervascularity, large volume ascites with anasarca, large left pleural effusion, right pleural fluid related to right pneumonectomy, distended gallbladder, absent left kidney, colonic diverticulosis and colo-colonic intussusception involving the proximal transverse large bowel; the lead point might be a cecal mass. Plan: 1. Clear liquid diet 2. Bowel prep tonight 3. Plan colonoscopy on Thurs for further evaluation of CT colonic findings; risks reviewed Patient is seen and examined in collaboration with Dr. Elizabeth Richey. Assessment and plan as per Dr. Elizabeth Richey.  
Devan Abdalla NP

## 2019-02-20 NOTE — PROGRESS NOTES
PM assessment complete. Pt A&O. Family at bedside. Denies needs a this time. Bed in locked/lowest position. Call light within reach. Demonstrated how to use if needs arise.

## 2019-02-20 NOTE — PROGRESS NOTES
[facility] CARDIOLOGY PROGRESS NOTE 
      
 
2/20/2019 11:20 AM 
 
Admit Date: 2/13/2019 Subjective:  
Pt reports diarrhea has slowed down, little swelling in RUE, denies LE swelling. She reports mild SOB, no CP or palpitations ROS: 
Cardiovascular:  As noted above Objective:  
  
Vitals:  
 02/19/19 1928 02/19/19 2324 02/20/19 0349 02/20/19 0800 BP: 109/65 110/71 115/63 116/71 Pulse: 73 60 77 82 Resp: 16 17 16 18 Temp: 98 °F (36.7 °C) 97.6 °F (36.4 °C) 97.5 °F (36.4 °C) 97.9 °F (36.6 °C) SpO2: 97% 96% 96% 95% Weight:      
Height:      
 
 
Physical Exam: 
General-No Acute Distress Neck- supple, no JVD 
CV- irregular rate and rhythm Lung- mild decreased BS in bases Abd- soft, nontender, nondistended Ext- trace edema bilaterally. Skin- warm and dry Data Review:  
Recent Labs  
  02/20/19 
0509 02/19/19 
0601 02/18/19 
7868  143 144  
K 3.9 3.6 4.0  
MG 2.0 2.1 1.2*  
BUN 12 11 14 CREA 1.30* 1.28* 1.41* GLU 54* 65 58* WBC 11.5*  --  11.7* HGB 11.5*  --  11.2* HCT 38.2  --  37.8   --  232 Assessment/Plan:  
 
Syncope- thought to be secondary to anemia, dehydration with diarrhea, hypotension Atrial fibrillation with rapid ventricular response (HCC) reportedly has permanent AF- h/o not able to take [facility] due to anemia and GIB requiring transfusion. Plan for rate control with CCB and Digoxin (Pt reports allergy to BB). Remain on ASA, CVA risk known. 
  
Systolic CHF, acute on chronic- Echo with EF 35-40% with mod MR. Med mgmt: ASA, no BB due to allergy, no ACE-I/ARB with hypotension and renal insufficiency, lasix was stopped due to BANDAR/dehydration with diarrhea- Pt with mild SOB, will check BNP today, resume home lasix 40 mg daily, monitor renal function and electrolytes 
  
Solitary right kidney (4/27/2018)- monitor creatinine per primary team 
 
Pulmonary nodule S/P lobectomy of lung Pneumonia (2/13/2019)- antibiotics per primary team 
  
Diarrhea (2/13/2019)- chronic, GI following- slowing down per Pt and family at bedside HPL: statin was stopped with her age and diarrhea.   
  
 
Mary Mcmanus PA-C 
2/20/2019 11:20 AM

## 2019-02-21 NOTE — PROGRESS NOTES
Problem: Mobility Impaired (Adult and Pediatric) Goal: *Acute Goals and Plan of Care (Insert Text) STG: 
(1.)Ms. Orquidea Johnson will move from supine to sit and sit to supine  with SUPERVISION within 5 treatment day(s). (2.)Ms. Orquidea Johnson will transfer from bed to chair and chair to bed with SUPERVISION using the least restrictive device within 5 treatment day(s). (3.)Ms. Orquidea Johnson will ambulate with SUPERVISION for 200 feet with the least restrictive device within 5 treatment day(s). (4.)Pt. Will increase B LE strength 1/2 grade within 5 days 
 
 
________________________________________________________________________________________________ PHYSICAL THERAPY: Daily Note and AM 2/21/2019INPATIENT: PT Visit Days : 5 [place] / [place] B / Product Type: Medicare /   
  
NAME/AGE/GENDER: Ethyl Blind is a 80 y.o. female PRIMARY DIAGNOSIS: Pneumonia [J18.9] Sepsis ([facility] [A41.9] Syncope and collapse Syncope and collapse ICD-10: Treatment Diagnosis:  
 · Generalized Muscle Weakness (M62.81) · Other lack of cordination (R27.8) · Other abnormalities of gait and mobility (R26.89) Precaution/Allergies: 
Atenolol ASSESSMENT:  
Ms. Orquidea Johnson supine upon contact. She states I need to use the [facility]. She performs bed mobility as follows: supine>EOB with Min A, then walk 10 steps to the [facility] then another 10 steps back to the bed. She return to supine with call light near. She need more help this morning, due to the Thoracentesis Dr. Paris Braun perform early this morning. This section established at most recent assessment PROBLEM LIST (Impairments causing functional limitations): 1. Decreased Strength 2. Decreased ADL/Functional Activities 3. Decreased Transfer Abilities 4. Decreased Ambulation Ability/Technique 5. Decreased Balance 6. Decreased Activity Tolerance 7. Decreased Work Simplification/Energy Conservation Techniques 8. Decreased Flexibility/Joint Mobility 9. Decreased [place] with Home Exercise Program 
 INTERVENTIONS PLANNED: (Benefits and precautions of physical therapy have been discussed with the patient.) 1. Balance Exercise 2. Bed Mobility 3. Family Education 4. Gait Training 5. Home Exercise Program (HEP) 6. Range of Motion (ROM) 7. Therapeutic Activites 8. Therapeutic Exercise/Strengthening 9. Transfer Training 10. endurance activities TREATMENT PLAN: Frequency/Duration: daily for duration of hospital stay Rehabilitation Potential For Stated Goals: Fair RECOMMENDED REHABILITATION/EQUIPMENT: (at time of discharge pending progress): Due to the probability of continued deficits (see above) this patient will likely need continued skilled physical therapy after discharge. Equipment:  
? uses a rollator at home HISTORY:  
History of Present Injury/Illness (Reason for Referral): Admitted after syncopal epeisode Past Medical History/Comorbidities: Ms. Sandra Huber  has a past medical history of Arthritis, Asthma, CAD (coronary artery disease), Cancer ([facility] Chronic kidney disease, Heart failure ([facility] Hypertension, Psychiatric disorder, and PUD (peptic ulcer disease). Ms. Sandra Huber  has a past surgical history that includes hx urological; hx other surgical; and hx heent. Social History/Living Environment:  
Home Environment: Apartment(3rd floor apt. Uses elevator) # Steps to Enter: 0 One/Two Story Residence: One story Living Alone: Yes Support Systems: (grand son is around part of the time) Patient Expects to be Discharged to[de-identified] (uncertain whether she'll go back to stay with dtr while awaiting going back to [place]) Current DME Used/Available at Home: [place] [employer], rollator Tub or Shower Type: Shower Prior Level of Function/Work/Activity: 
Lives in an apt in [place] and uses rollator. Performs ADLs independently Dominant Side:  
      RIGHT Number of Personal Factors/Comorbidities that affect the Plan of Care: 1-2: MODERATE COMPLEXITY EXAMINATION:  
Most Recent Physical Functioning:  
Gross Assessment: 
  
         
  
Posture: 
  
Balance: 
  Bed Mobility: 
Supine to Sit: Minimum assistance Sit to Supine: Minimum assistance Wheelchair Mobility: 
  
Transfers: 
Sit to Stand: Minimum assistance Stand to Sit: Minimum assistance Stand Pivot Transfers: Minimal assistance Bed to Chair: Minimum assistance Duration: 25 Minutes Gait: 
  
Base of Support: Narrowed Speed/Gemini: Pace decreased (<100 feet/min) Gait Abnormalities: Decreased step clearance Distance (ft): 10 Feet (ft)(x 2) Assistive Device: Walker, rolling Ambulation - Level of Assistance: Contact guard assistance Interventions: Safety awareness training Body Structures Involved: 1. Joints 2. Muscles Body Functions Affected: 1. Movement Related Activities and Participation Affected: 1. General Tasks and Demands 2. Mobility 3. Self Care Number of elements that affect the Plan of Care: 4+: HIGH COMPLEXITY CLINICAL PRESENTATION:  
Presentation: Stable and uncomplicated: LOW COMPLEXITY CLINICAL DECISION MAKING:  
[facility] AM-PAC 6 Clicks Basic Mobility Inpatient Short Form How much difficulty does the patient currently have. .. Unable A Lot A Little None 1. Turning over in bed (including adjusting bedclothes, sheets and blankets)? [] 1   [] 2   [x] 3   [] 4  
2. Sitting down on and standing up from a chair with arms ( e.g., wheelchair, bedside commode, etc.)   [] 1   [] 2   [x] 3   [] 4  
3. Moving from lying on back to sitting on the side of the bed? [] 1   [] 2   [x] 3   [] 4 How much help from another person does the patient currently need. .. Total A Lot A Little None 4. Moving to and from a bed to a chair (including a wheelchair)? [] 1   [] 2   [x] 3   [] 4  
5. Need to walk in hospital room? [] 1   [] 2   [x] 3   [] 4  
6. Climbing 3-5 steps with a railing?    [] 1   [x] 2   [] 3   [] 4  
 © 2007, Trustees of [facility], under license to [employer]. All rights reserved Score:  Initial: 17 Most Recent: X (Date: -- ) Interpretation of Tool:  Represents activities that are increasingly more difficult (i.e. Bed mobility, Transfers, Gait). Medical Necessity:    
· Patient demonstrates good rehab potential due to higher previous functional level. Reason for Services/Other Comments: 
· Patient continues to require present interventions due to patient's inability to perform functional mobility independently. Use of outcome tool(s) and clinical judgement create a POC that gives a: Clear prediction of patient's progress: LOW COMPLEXITY  
  
 
 
 
TREATMENT:  
(In addition to Assessment/Re-Assessment sessions the following treatments were rendered) Pre-treatment Symptoms/Complaints:  diarrhea Pain: Initial: not reporting any pain Pain Intensity 1: 0(0/10)  Post Session:    
 
Therapeutic Activity: (  25 Minutes ):  Therapeutic activities including Bed transfers, Toilet transfers, Ambulation on level ground to improve mobility, strength, balance and coordination. Required minimal Safety awareness training to promote static and dynamic balance in standing. Date: 
2/15/19 Date: 
2/18 Date: 
2/20 Activity/Exercise Parameters Parameters Parameters SITTING: ankle pumps 10 12 Hip flex 10 LAQ 10 Shldr flex 10 Elbow flex/ext 10 Quad sets  12 12  
heelslides  12 12 Hip abd/add  12 12 SAQ  12 12 All exercises B Braces/Orthotics/Lines/Etc:  
· IV 
· O2 Device: Room air Treatment/Session Assessment:   
· Response to Treatment:  Making steady progress. · Interdisciplinary Collaboration:  
o Registered Nurse · After treatment position/precautions:  
o Supine in bed 
o Bed/Chair-wheels locked 
o Call light within reach · Compliance with Program/Exercises: Will assess as treatment progresses Recommendations/Intent for next treatment session: \"Next visit will focus continue to work on exercises to get stronger, and work on gait distance Total Treatment Duration: PT Patient Time In/Time Out Time In: 1000 Time Out: 1025 Eva Garcia, PTA

## 2019-02-21 NOTE — INTERVAL H&P NOTE
H&P Update: 
Tavon Michel was seen and examined. History and physical has been reviewed. The patient has been examined.  There have been no significant clinical changes since the completion of the originally dated History and Physical. 
 
Signed By: Ivett Nino MD   
 February 21, 2019 10:05 AM

## 2019-02-21 NOTE — CONSULTS
CONSULT NOTE Michelle Sin 
 
2/21/2019 Date of Admission:  2/13/2019 Consultation performed at the request of Dr. Lenka Carmona Reason for consultation: LEFT Pleural effusion HPI:  
 
Michelle Sin is a 80 y.o. female, a never smoker, with h/o CHF, HTN, asthma, pulmonary nodule (benign per family) s/p R pneumonectomy about 23years ago in [facility], CKD, nephrectomy in 2016 for RCC, who was admitted to [facility] on 2/13/19 with frequent diarrhea and weight loss with syncope. She has undergone EGD with esophageal stricture noted and CT abd/pelvis was notable for colocolonic intussusception in prox large intestine concerning for malignancy and she is planned for colonoscopy. Additionally, there is a moderate left pleural effusion on seen on CT abd/pelvis. There are no current respiratory complaints but effusion can be tested for malignant cells. She is 96% on room air. REVIEW OF SYSTEMS: 
CONSTITUTIONAL:  There is no history of fever, chills, night sweats, weight gain, persistent fatigue, or lethargy/hypersomnolence. +weight loss EYES:  Denies problems with eye pain, erythema, blurred vision, or visual field loss. ENTM:  Denies history of tinnitus, epistaxis, sore throat, hoarseness, or dysphonia. LYMPH:  Denies swollen glands. CARDIAC:  No chest pain, pressure, discomfort, palpitations, orthopnea, murmurs, or edema. GI:  No dysphagia, heartburn reflux, nausea/vomiting, diarrhea, abdominal pain, or bleeding. :Denies history of dysuria, hematuria, polyuria, or decreased urine output. MS:  No history of myalgias, arthralgias, bone pain, or muscle cramps. SKIN:  No history of rashes, jaundice, cyanosis, nodules, or ulcers. ENDO:  Negative for heat or cold intolerance. No history of DM. PSYCH:  Negative for anxiety, depression, insomnia, hallucinations.  
NEURO:  There is no history of AMS, persistent headache, decreased level of consciousness, seizures, or motor or sensory deficits. Prior to Admission Medications Prescriptions Last Dose Informant Patient Reported? Taking? albuterol (PROVENTIL HFA, VENTOLIN HFA, PROAIR HFA) 90 mcg/actuation inhaler   Yes Yes Sig: Take 2 Puffs by inhalation every six (6) hours as needed for Wheezing. aspirin 81 mg chewable tablet   Yes Yes Sig: Take 81 mg by mouth daily. calcium carbonate (TUMS) 200 mg calcium (500 mg) chew   Yes Yes Sig: Take 1 Tab by mouth every six (6) hours as needed. Indications: Heartburn  
digoxin (LANOXIN) 0.125 mg tablet   Yes Yes Sig: Take 0.125 mg by mouth daily. dilTIAZem (CARDIZEM) 120 mg tablet   Yes Yes Sig: Take 120 mg by mouth daily. ferrous sulfate 325 mg (65 mg iron) tablet   No Yes Sig: Take 1 Tab by mouth daily (with breakfast). furosemide (LASIX) 40 mg tablet   Yes Yes Sig: Take  by mouth daily. lisinopril (PRINIVIL, ZESTRIL) 5 mg tablet   Yes Yes Sig: Take 5 mg by mouth daily. loperamide (IMMODIUM) 2 mg tablet   Yes Yes Sig: Take 2 mg by mouth four (4) times daily as needed for Diarrhea. Indications: Diarrhea  
omeprazole (PRILOSEC) 40 mg capsule   Yes Yes Sig: Take 40 mg by mouth daily. simvastatin (ZOCOR) 20 mg tablet   Yes Yes Sig: Take 20 mg by mouth nightly. Facility-Administered Medications: None Past Medical History:  
Diagnosis Date  Arthritis  Asthma  CAD (coronary artery disease)  Cancer ([facility] [address]  Chronic kidney disease   
 kidney removed  Heart failure ([facility] [address]  Hypertension  Psychiatric disorder   
 depresion  PUD (peptic ulcer disease)   
 acid reflux Past Surgical History:  
Procedure Laterality Date  HX HEENT    
 eye surgery  HX OTHER SURGICAL    
 lung  HX UROLOGICAL Social History Socioeconomic History  Marital status: SINGLE Spouse name: Not on file  Number of children: Not on file  Years of education: Not on file  Highest education level: Not on file Social Needs  Financial resource strain: Not on file  Food insecurity - worry: Not on file  Food insecurity - inability: Not on file  Transportation needs - medical: Not on file  Transportation needs - non-medical: Not on file Occupational History  Not on file Tobacco Use  Smoking status: Former Smoker  Smokeless tobacco: Never Used Substance and Sexual Activity  Alcohol use: No  
 Drug use: Not on file  Sexual activity: Not on file Other Topics Concern  Not on file Social History Narrative  Not on file History reviewed. No pertinent family history. Allergies Allergen Reactions  Atenolol Swelling Current Facility-Administered Medications Medication Dose Route Frequency  0.9% sodium chloride infusion  50 mL/hr IntraVENous CONTINUOUS  
 dextrose 40% (GLUTOSE) oral gel 1 Tube  15 g Oral PRN  
 glucagon (GLUCAGEN) injection 1 mg  1 mg IntraMUSCular PRN  
 dextrose (D50W) injection syrg 12.5-25 g  25-50 mL IntraVENous PRN  
 furosemide (LASIX) tablet 40 mg  40 mg Oral DAILY  sodium chloride (NS) flush 5-10 mL  5-10 mL IntraVENous PRN  
 oxyCODONE IR (ROXICODONE) tablet 5 mg  5 mg Oral Q6H PRN  
 ondansetron (ZOFRAN) injection 4 mg  4 mg IntraVENous Q4H PRN  
 acetaminophen (TYLENOL) tablet 650 mg  650 mg Oral Q6H PRN  pantoprazole (PROTONIX) tablet 40 mg  40 mg Oral ACB&D  
 magnesium oxide (MAG-OX) tablet 400 mg  400 mg Oral BID  dilTIAZem CD (CARDIZEM CD) capsule 120 mg  120 mg Oral DAILY  digoxin (LANOXIN) tablet 0.125 mg  0.125 mg Oral DAILY  aspirin chewable tablet 81 mg  81 mg Oral DAILY  piperacillin-tazobactam (ZOSYN) 4.5 g in 0.9% sodium chloride (MBP/ADV) 100 mL  4.5 g IntraVENous Q12H  
 
 
 
PHYSICAL EXAM  
 
Vitals:  
 02/20/19 1925 02/20/19 2306 02/21/19 0321 02/21/19 0715 BP: 115/66 117/72 115/67 107/62 Pulse: 73 76 69 70 Resp: 18 16 15 12 Temp: 96.1 °F (35.6 °C) 97.5 °F (36.4 °C) 97.6 °F (36.4 °C) 95.8 °F (35.4 °C) SpO2: 100% 92% 91% 96% Weight:      
Height:      
 
Constitutional:  the patient is well developed and in no acute distress EENMT:  Sclera clear, pupils equal, oral mucosa moist 
Respiratory: decreased L base and entire R lung without BS Cardiovascular:  RRR without M,G,R 
Gastrointestinal: soft and non-tender; with positive bowel sounds. Musculoskeletal: warm without cyanosis. There is + lower leg edema. Skin:  no jaundice or rashes, no wounds Neurologic: no gross neuro deficits Psychiatric:  alert and oriented x 3 Diagnostic Studies CXR:   
 
 
CT abd/pelvis: 
 
Recent Labs  
  02/21/19 
0651 02/20/19 
0509 WBC 11.4* 11.5* HGB 11.4* 11.5* HCT 37.2 38.2  270 Recent Labs  
  02/21/19 
8184 02/20/19 
0509 02/19/19 
0601  143 143  
K 4.7 3.9 3.6 * 111* 112* GLU 73 54* 65  
CO2 25 23 23 BUN 11 12 11 CREA 1.47* 1.30* 1.28* MG 2.1 2.0 2.1 CA 8.2* 7.8* 7.6* No results for input(s): PH, PCO2, PO2, HCO3 in the last 72 hours. No results for input(s): LCAD, LAC in the last 72 hours. Assessment:  (Medical Decision Making) Hospital Problems  Never Reviewed Codes Class Noted POA Liver mass ICD-10-CM: R16.0 ICD-9-CM: 573.9  2/20/2019 Unknown ? Metastasis? Cecal lesion ICD-10-CM: K63.9 ICD-9-CM: 569.89  2/20/2019 Unknown Colonoscopy today Pneumonia ICD-10-CM: J18.9 ICD-9-CM: 608  2/13/2019 Unknown Do not think she has pneumonia Sepsis ([facility] [address] ICD-10-CM: A41.9 ICD-9-CM: 038.9, 995.91  2/13/2019 Unknown * (Principal) Syncope and collapse ICD-10-CM: R55 
ICD-9-CM: 780.2  2/13/2019 Yes Diarrhea ICD-10-CM: R19.7 ICD-9-CM: 787.91  2/13/2019 Yes Edema ICD-10-CM: R60.9 ICD-9-CM: 782.3  2/13/2019 Yes Ongoing Pleural effusion ICD-10-CM: J90 ICD-9-CM: 511.9  2/13/2019 Yes Thoracentesis today Atrial fibrillation with rapid ventricular response (HCC) ICD-10-CM: I48.91 
ICD-9-CM: 427.31  4/27/2018 Yes Systolic CHF, acute on chronic (HCC) (Chronic) ICD-10-CM: Z11.26 ICD-9-CM: 428.23, 428.0  4/27/2018 Yes Solitary right kidney (Chronic) ICD-10-CM: Q60.0 ICD-9-CM: 753.0  4/27/2018 Yes S/P lobectomy of lung (Chronic) ICD-10-CM: Z90.2 ICD-9-CM: V45.89  4/27/2018 Yes Appears to be pneumonectomy for benign disease 23years ago. CKD (chronic kidney disease) stage 3, GFR 30-59 ml/min (HCC) (Chronic) ICD-10-CM: N18.3 ICD-9-CM: 585.3  4/27/2018 Yes Plan/Recommendations:  (Medical Decision Making) --Thoracentesis today with left pleural fluid sent for chemistry, Light criteria, cytology, flow cytometry. --will f/u pleural fluid studies and see Monday, when cytology & flow cytometry are likely to be back. More than 50% of the time documented was spent in face-to-face contact with the patient and in the care of the patient on the floor/unit where the patient is located. Thank you very much for this referral.  We appreciate the opportunity to participate in this patient's care. Will follow along with above stated plan.  
 
Jaydon Yip MD

## 2019-02-21 NOTE — ANESTHESIA PREPROCEDURE EVALUATION
Anesthetic History Review of Systems / Medical History Patient summary reviewed and pertinent labs reviewed Pulmonary Smoker (Former) Asthma Comments: S/p pulmonary resection for Tumor Neuro/Psych Psychiatric history (Depression) Cardiovascular Hypertension Valvular problems/murmurs (mod): mitral insufficiency CHF: dyspnea on exertion Dysrhythmias : atrial fibrillation CAD Exercise tolerance: <4 METS Comments: Echo 2/2019 SUMMARY: 
 
-  Left ventricle: Systolic function was moderately reduced. Ejection  
fraction 
was estimated in the range of 35 % to 40 %. This study was inadequate for the 
evaluation of regional wall motion due to atrial fibrillation. -  Left atrium: The atrium was markedly dilated. -  Right atrium: The atrium was markedly dilated. -  Mitral valve: There was moderate regurgitation. -  Tricuspid valve: There was mild regurgitation. GI/Hepatic/Renal 
  
 
 
Renal disease (s/p nephrectomy for tumor): CRI 
PUD Endo/Other Other Findings Comments: Elevated BNP, thoracentesis today. Physical Exam 
 
Airway Mallampati: III 
TM Distance: > 6 cm Neck ROM: decreased range of motion Mouth opening: Diminished (comment) Cardiovascular Rhythm: irregular Pertinent negatives: No murmur Dental 
 
Dentition: Edentulous Pulmonary Decreased breath sounds: bilateral 
 
 
 
Pertinent negatives: No rhonchi, wheezes and rales Abdominal 
 
 
 
 Other Findings Anesthetic Plan ASA: 3 Anesthesia type: total IV anesthesia Anesthetic plan and risks discussed with: Patient TIVA with propofol potentially ketamine may need inotropic support.

## 2019-02-21 NOTE — PROGRESS NOTES
CT Reviewed with radiology with following addendum: Addendum Addendum: Addendum: On second review, there appears to be a colocolonic 
intussusception involving the proximal transverse large bowel. The lead point 
might be a cecal mass. The referring physician was aware of these findings prior 
to this addendum. In light of this, the liver dome lesion may represent 
metastatic disease In light of anemia, weight loss, altered stools and these findings attempt and colonoscopy prep with evaluation/biopsies for definitive diagnosis is necessary. Family and patient aware of findings. Edison Aguero MD 
[facility]

## 2019-02-21 NOTE — PROGRESS NOTES
Am assessment completed. Pt is alert and oriented x 4, pt doesn't have right lung and left has fluid. Pt is having a thoracentesis today along with colonoscopy. According to patient she drank 1 pitcher of prep and then was clear. Asked patient to please attempt to drink rest of prep so that we can be sure she is clean.

## 2019-02-21 NOTE — CONSULTS
[facility] Inpatient Hematology / Oncology Consult NoteReason for Consult:  Pneumonia [J18.9] Sepsis ([facility] [address] [A41.9] Referring Physician:  Delmus Burkitt, MD 
History of Present Illness: Ms. Brynda Cogan is a 80 y.o. female admitted on 2/13/2019. Her PMH includes CHF, HTN, asthma, and kidney cancer s/p resection, hx of lung nodule - not known to be cancerous, but s/p pneumonectomy. Pt presented with c/o being unwell and with diarrhea for months. She was last admitted to a hospital in [facility] in Dec 2018 after passing out at [employer], she was given blood and scheduled for a colonoscopy, but had poor prep so it was not performed. She passed out again at her home in [facility] on 2/9, so daughter brought her to [place]. Along the way, she passed out at a sink while washing her hands after a BM. CXR with L basilar infiltrate and small pleural effusion. CT AP with liver dome mass; also with large volume ascites with anasarca, distended gallbladder, and colonic diverticulosis. Cdiff neg. EGD performed on 2/19 with no source for anemia or weight loss found. Colonoscopy performed today with large mass noted in the transverse colon, multiple biopsies taken. CEA elevated at 97. L thoracentesis performed today, 650mL removed and sent for studies. We were consulted for suspected colon cancer with liver mets. Review of Systems: 
Constitutional +appetite changes +wt loss +fatigue. Denies fever, chills, night sweats. HEENT Denies trauma, blurry vision, hearing loss, ear pain, nosebleeds, sore throat, neck pain and ear discharge. Skin Denies lesions or rashes. Lungs Denies dyspnea, cough, sputum production or hemoptysis. Cardiovascular Denies chest pain, palpitations, or lower extremity edema. Gastrointestinal +diarrhea. Denies nausea, vomiting, bloody or black stools, abdominal pain.  Denies dysuria, frequency or hesitancy of urination. Neuro +syncope. Denies headaches, visual changes or ataxia. Denies dizziness, tingling, tremors, sensory change, speech change, focal weakness or headaches. Hematology Denies easy bruising or bleeding, denies gingival bleeding or epistaxis. Endo Denies heat/cold intolerance, denies diabetes or thyroid abnormalities. MSK Denies back pain, arthralgias, myalgias or frequent falls. Psychiatric/Behavioral +hx depression. Denies depression and substance abuse. The patient is not nervous/anxious. Allergies Allergen Reactions  Atenolol Swelling Past Medical History:  
Diagnosis Date  Arthritis  Asthma  CAD (coronary artery disease)  Cancer ([facility] [address]  Chronic kidney disease   
 kidney removed  Heart failure ([facility] [address]  Hypertension  Psychiatric disorder   
 depresion  PUD (peptic ulcer disease)   
 acid reflux Past Surgical History:  
Procedure Laterality Date  HX HEENT    
 eye surgery  HX OTHER SURGICAL    
 lung  HX UROLOGICAL History reviewed. No pertinent family history. Social History Socioeconomic History  Marital status: SINGLE Spouse name: Not on file  Number of children: Not on file  Years of education: Not on file  Highest education level: Not on file Social Needs  Financial resource strain: Not on file  Food insecurity - worry: Not on file  Food insecurity - inability: Not on file  Transportation needs - medical: Not on file  Transportation needs - non-medical: Not on file Occupational History  Not on file Tobacco Use  Smoking status: Former Smoker  Smokeless tobacco: Never Used Substance and Sexual Activity  Alcohol use: No  
 Drug use: Not on file  Sexual activity: Not on file Other Topics Concern  Not on file Social History Narrative  Not on file Current Facility-Administered Medications Medication Dose Route Frequency Provider Last Rate Last Dose  0.9% sodium chloride infusion  50 mL/hr IntraVENous CONTINUOUS Alise Leahy MD 50 mL/hr at 02/21/19 0800 50 mL/hr at 02/21/19 0800  
 dextrose 40% (GLUTOSE) oral gel 1 Tube  15 g Oral PRN Alise Leahy MD      
 glucagon (GLUCAGEN) injection 1 mg  1 mg IntraMUSCular PRN Alise Leahy MD      
 dextrose (D50W) injection syrg 12.5-25 g  25-50 mL IntraVENous PRN Alise Leahy MD      
 furosemide (LASIX) tablet 40 mg  40 mg Oral DAILY Mary Mcmanus PA-C   40 mg at 02/21/19 0122  sodium chloride (NS) flush 5-10 mL  5-10 mL IntraVENous PRN Alise Leahy MD      
 oxyCODONE IR (ROXICODONE) tablet 5 mg  5 mg Oral Q6H PRN Alise Leahy MD      
 ondansetron (ZOFRAN) injection 4 mg  4 mg IntraVENous Q4H PRN Alise Leahy MD      
 acetaminophen (TYLENOL) tablet 650 mg  650 mg Oral Q6H PRN Alise Leahy MD      
 pantoprazole (PROTONIX) tablet 40 mg  40 mg Oral ACB&D Alise Leahy MD   40 mg at 02/21/19 0921  
 magnesium oxide (MAG-OX) tablet 400 mg  400 mg Oral BID Alise Leahy MD   400 mg at 02/21/19 6547  dilTIAZem CD (CARDIZEM CD) capsule 120 mg  120 mg Oral DAILY Alise Leahy MD   120 mg at 02/21/19 7555  digoxin (LANOXIN) tablet 0.125 mg  0.125 mg Oral DAILY Alise Leahy MD   0.125 mg at 02/21/19 5024  aspirin chewable tablet 81 mg  81 mg Oral DAILY Alise Leahy MD   81 mg at 02/21/19 0926  
 piperacillin-tazobactam (ZOSYN) 4.5 g in 0.9% sodium chloride (MBP/ADV) 100 mL  4.5 g IntraVENous Q12H Alise Leahy MD 25 mL/hr at 02/21/19 0442 4.5 g at 02/21/19 9905 OBJECTIVE: 
Patient Vitals for the past 8 hrs: 
 BP Temp Pulse Resp SpO2  
02/21/19 1401 151/78  83 16 95 % 02/21/19 1346 125/68  87 16 95 % 02/21/19 1341 137/72  95 16 98 % 02/21/19 1336 136/70  76 16   
02/21/19 1335 127/80  82 14 98 % 02/21/19 1242 130/54  72 14 98 % 02/21/19 1055     100 % 02/21/19 1021 106/57 96.3 °F (35.7 °C) 69 12 95 % 19 1000 [address] Temp (24hrs), Av.7 °F (35.9 °C), Min:95.8 °F (35.4 °C), Max:97.6 °F (36.4 °C) No intake/output data recorded. Physical Exam: 
Constitutional: Well developed, well nourished female in no acute distress, sitting comfortably in the hospital bed. HEENT: Normocephalic and atraumatic. Oropharynx is clear, mucous membranes are moist.  Extraocular muscles are intact. Sclerae anicteric. Neck supple without JVD. No thyromegaly present. Lymph node No palpable submandibular, cervical, supraclavicular, axillary or inguinal lymph nodes. Skin Warm and dry. No bruising and no rash noted. No erythema. No pallor. Respiratory Lungs are clear to auscultation bilaterally without wheezes, rales or rhonchi, normal air exchange without accessory muscle use. CVS Normal rate, regular rhythm and normal S1 and S2. No murmurs, gallops, or rubs. Abdomen Soft, nontender and nondistended, normoactive bowel sounds. No palpable mass. No hepatosplenomegaly. Neuro Grossly nonfocal with no obvious sensory or motor deficits. MSK Normal range of motion in general.  No edema and no tenderness. Psych Appropriate mood and affect. Labs:   
Recent Results (from the past 24 hour(s)) MAGNESIUM Collection Time: 19  6:51 AM  
Result Value Ref Range Magnesium 2.1 1.8 - 2.4 mg/dL BNP Collection Time: 19  6:51 AM  
Result Value Ref Range  pg/mL METABOLIC PANEL, BASIC Collection Time: 19  6:51 AM  
Result Value Ref Range Sodium 143 136 - 145 mmol/L Potassium 4.7 3.5 - 5.1 mmol/L Chloride 111 (H) 98 - 107 mmol/L  
 CO2 25 21 - 32 mmol/L Anion gap 7 mmol/L Glucose 73 65 - 100 mg/dL BUN 11 8 - 23 MG/DL Creatinine 1.47 (H) 0.6 - 1.0 MG/DL  
 GFR est AA 43 (L) >60 ml/min/1.73m2 GFR est non-AA 36 ml/min/1.73m2 Calcium 8.2 (L) 8.3 - 10.4 MG/DL  
CBC WITH AUTOMATED DIFF  Collection Time: 19  6:51 AM  
 Result Value Ref Range WBC 11.4 (H) 4.3 - 11.1 K/uL  
 RBC 4.26 4.05 - 5.2 M/uL  
 HGB 11.4 (L) 11.7 - 15.4 g/dL HCT 37.2 35.8 - 46.3 % MCV 87.3 79.6 - 97.8 FL  
 MCH 26.8 26.1 - 32.9 PG  
 MCHC 30.6 (L) 31.4 - 35.0 g/dL  
 RDW 19.9 (H) 11.9 - 14.6 % PLATELET 065 419 - 317 K/uL MPV 10.1 9.4 - 12.3 FL ABSOLUTE NRBC 0.00 0.0 - 0.2 K/uL  
 DF AUTOMATED NEUTROPHILS 79 (H) 43 - 78 % LYMPHOCYTES 14 13 - 44 % MONOCYTES 4 4.0 - 12.0 % EOSINOPHILS 2 0.5 - 7.8 % BASOPHILS 0 0.0 - 2.0 % IMMATURE GRANULOCYTES 1 0.0 - 5.0 %  
 ABS. NEUTROPHILS 9.0 (H) 1.7 - 8.2 K/UL  
 ABS. LYMPHOCYTES 1.5 0.5 - 4.6 K/UL  
 ABS. MONOCYTES 0.5 0.1 - 1.3 K/UL  
 ABS. EOSINOPHILS 0.3 0.0 - 0.8 K/UL  
 ABS. BASOPHILS 0.0 0.0 - 0.2 K/UL  
 ABS. IMM. GRANS. 0.1 0.0 - 0.5 K/UL LD Collection Time: 02/21/19  6:51 AM  
Result Value Ref Range  (H) 110 - 210 U/L  
PROTEIN, TOTAL Collection Time: 02/21/19  6:51 AM  
Result Value Ref Range Protein, total 5.5 g/dL LDH, BODY FLUID Collection Time: 02/21/19  9:45 AM  
Result Value Ref Range Fluid Type: PLEURAL FLUID    
 LD, body fld. RESULTS SCANNED IN [facility] U/L  
PROTEIN TOTAL, FLUID Collection Time: 02/21/19  9:45 AM  
Result Value Ref Range Fluid Type: PLEURAL FLUID Protein total, body fld. RESULTS SCANNED IN [facility] g/dL GLUCOSE, FLUID Collection Time: 02/21/19  9:45 AM  
Result Value Ref Range Fluid Type: PLEURAL FLUID Glucose, body fld. RESULTS SCANNED IN [facility] MG/DL  
CELL COUNT, BODY FLUID Collection Time: 02/21/19  9:45 AM  
Result Value Ref Range BODY FLUID TYPE PLEURAL FLUID FLUID COLOR STRAW    
 FLUID APPEARANCE CLEAR    
 FLUID RBC CT. <1,000 /cu mm FLUID WBC COUNT 211 /cu mm  
 FLD NEUTROPHILS 12 % FLD LYMPHS 86 % FLD MONO/MACROPHAGES 2 % FLOW CYTOMETRY, MISC Collection Time: 02/21/19  9:45 AM  
Result Value Ref Range  Test ordered: COMPREHENSIVE ANALYSIS   
 Source MANUAL FLUID - [facility] Flow Cytometry PENDING   
COMPREHENSIVE ANALYSIS, MISC Collection Time: 02/21/19  9:45 AM  
Result Value Ref Range Test ordered: PENDING Source PENDING Flow Cytometry PENDING Imaging: 
CT ABD PELV W CONT [[id number]] Collected: 02/20/19 1015 Order Status: Completed Updated: 02/20/19 134 Addenda: Addendum: Addendum: On second review, there appears to be a colocolonic intussusception involving the proximal transverse large bowel. The lead   point might be a cecal mass. The referring physician was aware of these findings   prior to this addendum. In light of this, the liver dome lesion may represent metastatic disease. Signed: 02/20/19 1347 by Mukul Maddox MD  
Narrative:    
CT of the Abdomen and Pelvis with contrast  
 
INDICATION:  Weight loss. Gastroesophageal reflux. Elevated CEA 
 
COMPARISON: None TECHNIQUE: Multiple axial images were obtained through the abdomen and pelvis 
after intravenous injection of 100 mL Isovue 370. Oral contrast was 
administered. Coronal reformatted images were provided. Contrast necessary to 
increase sensitivity for neoplasia and infection. Radiation dose reduction 
techniques were used for this study:  Our CT scanners use one or all of the 
following: Automated exposure control, adjustment of the mA and/or kVp according 
to patient's size, iterative reconstruction. FINDINGS: 
Abdomen CT:  Mediastinal shift to the right there is a moderate-sized simple 
left pleural effusion. The left lung shows dependent atelectasis. The remainder 
of the included left lung is hyperexpanded. There is a peripherally enhancing at 
least moderate right pleural fluid collection. The patient is status post right 
pneumonectomy. Coronary artery calcifications. There is diffuse anasarca. No 
discrete splenic abnormality. There are few hypoattenuating scattered liver 
cysts.  Peripherally dense 2.2 cm liver dome mass with adjacent hypervascular 
wedge-shaped low artifact. Large amount of abdominal ascites. Gallbladder 
distention without gallstones. Right adrenal gland is normal. Couple 
hypoattenuating right upper pole small renal cysts. Left kidney and left adrenal 
gland are not seen. Pancreas is normal. Abdominal aorta is atherosclerotic with 
atherosclerotic major branch vessels. Diffuse anasarca. CT PELVIS: Urinary bladder is normal. Colonic diverticulosis without 
diverticulitis. Large volume pelvic free fluid. Subcutaneous air compatible with 
medication injection site is left of midline. The bones show no acute or 
aggressive lesions. Impression:    
IMPRESSION: Peripherally dense liver dome 2.2 cm mass with adjacent 
hypervascularity. The peripheral density may represent calcification or contrast 
enhancement. Noncontrast imaging would be beneficial. HCC is possible. 2. Large volume ascites with anasarca. Large left pleural effusion. Right 
pleural fluid related to right pneumonectomy. 3. Distended gallbladder. 4. Absent left kidney. 5. Colonic diverticulosis. XR UPPER GI SERIES W KUB [[id number]] Collected: 02/18/19 1710 Order Status: Completed Updated: 02/18/19 1715 Narrative:    
Indication:80year-old female with history of previous lung surgery 20 years 
ago. Patient has known esophageal and tracheal deviation to the right. Patient 
recently been experiencing anemia, nausea, and weight loss Comparison exams: None Findings:  view the abdomen demonstrates moderate to borderline large 
amount of gaseous distention of both large and small bowel loops. This is 
suggestive of a generalized ileus. Under direct fluoroscopic visualization, patient took one sip of barium. However, there is no visible transit past the proximal to middle esophagus. After waiting 10 minutes, and no demonstratable movement of barium past this 
point, the study was terminated. Total fluoroscopy time = 1 minute Impression:    
Impression:   
No visible esophageal motility.  
 Study was terminated at this point. Findings were discussed the patient time examination. XR CHEST PA LAT [[id number]] Collected: 02/13/19 1116 Order Status: Completed Updated: 02/13/19 1120 Narrative:    
Chest 2 view CLINICAL INDICATION: Acute syncope and weakness, vomiting, one week of upper 
abdominal pain and diarrhea. History of coronary artery disease, hypertension, 
heart failure, arthritis, asthma, lung nodules and scarring, chronic kidney 
disease, former smoker, right lung lobectomy COMPARISON: 4/27/2018 radiograph TECHNIQUE: Upright AP and lateral views of the chest  
 
FINDINGS: Again demonstrated is complete opacification of the right hemithorax 
with perihilar surgical clips, and deviation of the trachea and mediastinum to 
the right. This is suggestive of previous right pneumonectomy and not definitely 
changed since prior. Mediastinal and hilar contours are mostly obscured. There 
is increased mild groundglass opacity in the left base, as well as a small left 
pleural effusion. Mild chronic linear opacities projecting over right upper lung 
are unchanged and most compatible with scarring. No evidence of a pneumothorax. Diffusely low bone density. Impression:    
IMPRESSION:  
1. Left basilar infiltrate and small left pleural effusion. 2. Chronic right pneumonectomy change again noted. ASSESSMENT: 
Problem List  Never Reviewed Codes Class Noted Liver mass ICD-10-CM: R16.0 ICD-9-CM: 573.9  2/20/2019 Cecal lesion ICD-10-CM: K63.9 ICD-9-CM: 569.89  2/20/2019 Pneumonia ICD-10-CM: J18.9 ICD-9-CM: 694  2/13/2019 Sepsis ([facility] [address] ICD-10-CM: A41.9 ICD-9-CM: 038.9, 995.91  2/13/2019 * (Principal) Syncope and collapse ICD-10-CM: R55 
ICD-9-CM: 780.2  2/13/2019 Diarrhea ICD-10-CM: R19.7 ICD-9-CM: 787.91  2/13/2019 Edema ICD-10-CM: R60.9 ICD-9-CM: 782.3  2/13/2019 Pleural effusion ICD-10-CM: J90 ICD-9-CM: 511.9  2/13/2019 Atrial fibrillation with rapid ventricular response (HCC) ICD-10-CM: I48.91 
ICD-9-CM: 427.31  4/27/2018 Systolic CHF, acute on chronic (HCC) (Chronic) ICD-10-CM: R55.35 ICD-9-CM: 428.23, 428.0  4/27/2018 Solitary right kidney (Chronic) ICD-10-CM: Q60.0 ICD-9-CM: 753.0  4/27/2018 S/P lobectomy of lung (Chronic) ICD-10-CM: Z90.2 ICD-9-CM: V45.89  4/27/2018 CKD (chronic kidney disease) stage 3, GFR 30-59 ml/min (HCC) (Chronic) ICD-10-CM: N18.3 ICD-9-CM: 585.3  4/27/2018 Chronic anemia (Chronic) ICD-10-CM: D64.9 ICD-9-CM: 285.9  4/27/2018 Hypomagnesemia ICD-10-CM: I21.34 
ICD-9-CM: 275.2  4/27/2018 Hypokalemia ICD-10-CM: E87.6 ICD-9-CM: 276.8  4/27/2018 RECOMMENDATIONS: 
80 y.o. F consulted for colon cancer. She lived in [place] independently till recently developed multiple episodes of syncope, relocated to [place] to join daughter's family and found sever anemia, responded to pRBC properly, GI found a large transverse colon mass, CT also showed hypervascular liver mass. I discussed with pt and family the liver mass certainly is most suspicious of metastatic colon cancer, but also possible for metastatic RCC considering the hypervascular feature given her left RCC s/p nephrectomy in 2016, vs [address] as radiology commented. I discussed with primary team and agreed to arrange US guided liver biopsy to ultimately clarify, also follow the cytology from pleural effusion. Once liver biopsy is done, she may consider having a PET and follow all results in office. Pt and family understood and agreeable with the plan. Lab studies and imaging studies were personally reviewed. Thank you for allowing us to participate in the care of Ms. Valli Boas. [facility] [address] [address] Office : [phone] Fax : [phone]

## 2019-02-21 NOTE — H&P (VIEW-ONLY)
CONSULT NOTE Michelle Sin 
 
2/21/2019 Date of Admission:  2/13/2019 Consultation performed at the request of Dr. Lenka Carmona Reason for consultation: LEFT Pleural effusion HPI:  
 
Michelle Sin is a 80 y.o. female, a never smoker, with h/o CHF, HTN, asthma, pulmonary nodule (benign per family) s/p R pneumonectomy about 23years ago in [facility], CKD, nephrectomy in 2016 for RCC, who was admitted to [facility] on 2/13/19 with frequent diarrhea and weight loss with syncope. She has undergone EGD with esophageal stricture noted and CT abd/pelvis was notable for colocolonic intussusception in prox large intestine concerning for malignancy and she is planned for colonoscopy. Additionally, there is a moderate left pleural effusion on seen on CT abd/pelvis. There are no current respiratory complaints but effusion can be tested for malignant cells. She is 96% on room air. REVIEW OF SYSTEMS: 
CONSTITUTIONAL:  There is no history of fever, chills, night sweats, weight gain, persistent fatigue, or lethargy/hypersomnolence. +weight loss EYES:  Denies problems with eye pain, erythema, blurred vision, or visual field loss. ENTM:  Denies history of tinnitus, epistaxis, sore throat, hoarseness, or dysphonia. LYMPH:  Denies swollen glands. CARDIAC:  No chest pain, pressure, discomfort, palpitations, orthopnea, murmurs, or edema. GI:  No dysphagia, heartburn reflux, nausea/vomiting, diarrhea, abdominal pain, or bleeding. :Denies history of dysuria, hematuria, polyuria, or decreased urine output. MS:  No history of myalgias, arthralgias, bone pain, or muscle cramps. SKIN:  No history of rashes, jaundice, cyanosis, nodules, or ulcers. ENDO:  Negative for heat or cold intolerance. No history of DM. PSYCH:  Negative for anxiety, depression, insomnia, hallucinations.  
NEURO:  There is no history of AMS, persistent headache, decreased level of consciousness, seizures, or motor or sensory deficits. Prior to Admission Medications Prescriptions Last Dose Informant Patient Reported? Taking? albuterol (PROVENTIL HFA, VENTOLIN HFA, PROAIR HFA) 90 mcg/actuation inhaler   Yes Yes Sig: Take 2 Puffs by inhalation every six (6) hours as needed for Wheezing. aspirin 81 mg chewable tablet   Yes Yes Sig: Take 81 mg by mouth daily. calcium carbonate (TUMS) 200 mg calcium (500 mg) chew   Yes Yes Sig: Take 1 Tab by mouth every six (6) hours as needed. Indications: Heartburn  
digoxin (LANOXIN) 0.125 mg tablet   Yes Yes Sig: Take 0.125 mg by mouth daily. dilTIAZem (CARDIZEM) 120 mg tablet   Yes Yes Sig: Take 120 mg by mouth daily. ferrous sulfate 325 mg (65 mg iron) tablet   No Yes Sig: Take 1 Tab by mouth daily (with breakfast). furosemide (LASIX) 40 mg tablet   Yes Yes Sig: Take  by mouth daily. lisinopril (PRINIVIL, ZESTRIL) 5 mg tablet   Yes Yes Sig: Take 5 mg by mouth daily. loperamide (IMMODIUM) 2 mg tablet   Yes Yes Sig: Take 2 mg by mouth four (4) times daily as needed for Diarrhea. Indications: Diarrhea  
omeprazole (PRILOSEC) 40 mg capsule   Yes Yes Sig: Take 40 mg by mouth daily. simvastatin (ZOCOR) 20 mg tablet   Yes Yes Sig: Take 20 mg by mouth nightly. Facility-Administered Medications: None Past Medical History:  
Diagnosis Date  Arthritis  Asthma  CAD (coronary artery disease)  Cancer ([facility] [address]  Chronic kidney disease   
 kidney removed  Heart failure ([facility] [address]  Hypertension  Psychiatric disorder   
 depresion  PUD (peptic ulcer disease)   
 acid reflux Past Surgical History:  
Procedure Laterality Date  HX HEENT    
 eye surgery  HX OTHER SURGICAL    
 lung  HX UROLOGICAL Social History Socioeconomic History  Marital status: SINGLE Spouse name: Not on file  Number of children: Not on file  Years of education: Not on file  Highest education level: Not on file Social Needs  Financial resource strain: Not on file  Food insecurity - worry: Not on file  Food insecurity - inability: Not on file  Transportation needs - medical: Not on file  Transportation needs - non-medical: Not on file Occupational History  Not on file Tobacco Use  Smoking status: Former Smoker  Smokeless tobacco: Never Used Substance and Sexual Activity  Alcohol use: No  
 Drug use: Not on file  Sexual activity: Not on file Other Topics Concern  Not on file Social History Narrative  Not on file History reviewed. No pertinent family history. Allergies Allergen Reactions  Atenolol Swelling Current Facility-Administered Medications Medication Dose Route Frequency  0.9% sodium chloride infusion  50 mL/hr IntraVENous CONTINUOUS  
 dextrose 40% (GLUTOSE) oral gel 1 Tube  15 g Oral PRN  
 glucagon (GLUCAGEN) injection 1 mg  1 mg IntraMUSCular PRN  
 dextrose (D50W) injection syrg 12.5-25 g  25-50 mL IntraVENous PRN  
 furosemide (LASIX) tablet 40 mg  40 mg Oral DAILY  sodium chloride (NS) flush 5-10 mL  5-10 mL IntraVENous PRN  
 oxyCODONE IR (ROXICODONE) tablet 5 mg  5 mg Oral Q6H PRN  
 ondansetron (ZOFRAN) injection 4 mg  4 mg IntraVENous Q4H PRN  
 acetaminophen (TYLENOL) tablet 650 mg  650 mg Oral Q6H PRN  pantoprazole (PROTONIX) tablet 40 mg  40 mg Oral ACB&D  
 magnesium oxide (MAG-OX) tablet 400 mg  400 mg Oral BID  dilTIAZem CD (CARDIZEM CD) capsule 120 mg  120 mg Oral DAILY  digoxin (LANOXIN) tablet 0.125 mg  0.125 mg Oral DAILY  aspirin chewable tablet 81 mg  81 mg Oral DAILY  piperacillin-tazobactam (ZOSYN) 4.5 g in 0.9% sodium chloride (MBP/ADV) 100 mL  4.5 g IntraVENous Q12H  
 
 
 
PHYSICAL EXAM  
 
Vitals:  
 02/20/19 1925 02/20/19 2306 02/21/19 0321 02/21/19 0715 BP: 115/66 117/72 115/67 107/62 Pulse: 73 76 69 70 Resp: 18 16 15 12 Temp: 96.1 °F (35.6 °C) 97.5 °F (36.4 °C) 97.6 °F (36.4 °C) 95.8 °F (35.4 °C) SpO2: 100% 92% 91% 96% Weight:      
Height:      
 
Constitutional:  the patient is well developed and in no acute distress EENMT:  Sclera clear, pupils equal, oral mucosa moist 
Respiratory: decreased L base and entire R lung without BS Cardiovascular:  RRR without M,G,R 
Gastrointestinal: soft and non-tender; with positive bowel sounds. Musculoskeletal: warm without cyanosis. There is + lower leg edema. Skin:  no jaundice or rashes, no wounds Neurologic: no gross neuro deficits Psychiatric:  alert and oriented x 3 Diagnostic Studies CXR:   
 
 
CT abd/pelvis: 
 
Recent Labs  
  02/21/19 
0651 02/20/19 
0509 WBC 11.4* 11.5* HGB 11.4* 11.5* HCT 37.2 38.2  270 Recent Labs  
  02/21/19 
3792 02/20/19 
0509 02/19/19 
0601  143 143  
K 4.7 3.9 3.6 * 111* 112* GLU 73 54* 65  
CO2 25 23 23 BUN 11 12 11 CREA 1.47* 1.30* 1.28* MG 2.1 2.0 2.1 CA 8.2* 7.8* 7.6* No results for input(s): PH, PCO2, PO2, HCO3 in the last 72 hours. No results for input(s): LCAD, LAC in the last 72 hours. Assessment:  (Medical Decision Making) Hospital Problems  Never Reviewed Codes Class Noted POA Liver mass ICD-10-CM: R16.0 ICD-9-CM: 573.9  2/20/2019 Unknown ? Metastasis? Cecal lesion ICD-10-CM: K63.9 ICD-9-CM: 569.89  2/20/2019 Unknown Colonoscopy today Pneumonia ICD-10-CM: J18.9 ICD-9-CM: 592  2/13/2019 Unknown Do not think she has pneumonia Sepsis ([facility] [address] ICD-10-CM: A41.9 ICD-9-CM: 038.9, 995.91  2/13/2019 Unknown * (Principal) Syncope and collapse ICD-10-CM: R55 
ICD-9-CM: 780.2  2/13/2019 Yes Diarrhea ICD-10-CM: R19.7 ICD-9-CM: 787.91  2/13/2019 Yes Edema ICD-10-CM: R60.9 ICD-9-CM: 782.3  2/13/2019 Yes Ongoing Pleural effusion ICD-10-CM: J90 ICD-9-CM: 511.9  2/13/2019 Yes Thoracentesis today Atrial fibrillation with rapid ventricular response (HCC) ICD-10-CM: I48.91 
ICD-9-CM: 427.31  4/27/2018 Yes Systolic CHF, acute on chronic (HCC) (Chronic) ICD-10-CM: I45.36 ICD-9-CM: 428.23, 428.0  4/27/2018 Yes Solitary right kidney (Chronic) ICD-10-CM: Q60.0 ICD-9-CM: 753.0  4/27/2018 Yes S/P lobectomy of lung (Chronic) ICD-10-CM: Z90.2 ICD-9-CM: V45.89  4/27/2018 Yes Appears to be pneumonectomy for benign disease 23years ago. CKD (chronic kidney disease) stage 3, GFR 30-59 ml/min (HCC) (Chronic) ICD-10-CM: N18.3 ICD-9-CM: 585.3  4/27/2018 Yes Plan/Recommendations:  (Medical Decision Making) --Thoracentesis today with left pleural fluid sent for chemistry, Light criteria, cytology, flow cytometry. --will f/u pleural fluid studies and see Monday, when cytology & flow cytometry are likely to be back. More than 50% of the time documented was spent in face-to-face contact with the patient and in the care of the patient on the floor/unit where the patient is located. Thank you very much for this referral.  We appreciate the opportunity to participate in this patient's care. Will follow along with above stated plan.  
 
Swathi Kwon MD

## 2019-02-21 NOTE — PERIOP NOTES
TRANSFER - IN REPORT: 
 
Verbal report received from [facility] on Lillian Goldstein  being received from 3rd floor for routine progression of care Report consisted of patients Situation, Background, Assessment and  
Recommendations(SBAR). Information from the following report(s) Kardex was reviewed with the receiving nurse. Opportunity for questions and clarification was provided. Assessment completed upon patients arrival to unit and care assumed.

## 2019-02-21 NOTE — PROGRESS NOTES
PM assessment complete. A&O. Currently doing bowel prep for colonoscopy tomorrow; verbalizes understanding to be NPO after midnight. No complaints at this time. Verbalizes understanding to call for restroom assistance. Denies needs at this time. Bed in locked/lowest position. Call light within reach. Will continue to monitor.

## 2019-02-21 NOTE — PERIOP NOTES
TRANSFER - OUT REPORT: 
 
Verbal report given to Gloria Centeno RN on [facility]  being transferred to [facility] surg room 358 for routine progression of care Report consisted of patients Situation, Background, Assessment and  
Recommendations(SBAR). Information from the following report(s) SBAR, Intake/Output and MAR was reviewed with the receiving nurse. Opportunity for questions and clarification was provided. Patient transported with: 
 O2 @ 2 liters Tech

## 2019-02-21 NOTE — DISCHARGE INSTRUCTIONS
Colonoscopy: What to Expect at [address] [place] [address]  After you have a colonoscopy, you will stay at the clinic for 1 to 2 hours until the medicines wear off. Then you can go home. But you will need to arrange for a ride. Your doctor will tell you when you can eat and do your other usual activities. Your doctor will talk to you about when you will need your next colonoscopy. Your doctor can help you decide how often you need to be checked. This will depend on the results of your test and your risk for colorectal cancer. After the test, you may be bloated or have gas pains. You may need to pass gas. If a biopsy was done or a polyp was removed, you may have streaks of blood in your stool (feces) for a few days. Problems such as heavy rectal bleeding may not occur until several weeks after the test. This isn't common. But it can happen after polyps are removed. This care sheet gives you a general idea about how long it will take for you to recover. But each person recovers at a different pace. Follow the steps below to get better as quickly as possible. How can you care for yourself at home? Activity    · Rest when you feel tired.     · You can do your normal activities when it feels okay to do so. Diet    · Follow your doctor's directions for eating.     · Unless your doctor has told you not to, drink plenty of fluids. This helps to replace the fluids that were lost during the colon prep.     · Do not drink alcohol. Medicines    · Your doctor will tell you if and when you can restart your medicines. He or she will also give you instructions about taking any new medicines.     · If you take blood thinners, such as warfarin (Coumadin), clopidogrel (Plavix), or aspirin, be sure to talk to your doctor. He or she will tell you if and when to start taking those medicines again.  Make sure that you understand exactly what your doctor wants you to do.     · If polyps were removed or a biopsy was done during the test, your doctor may tell you not to take aspirin or other anti-inflammatory medicines for a few days. These include ibuprofen (Advil, Motrin) and naproxen (Aleve). Other instructions    · For your safety, do not drive or operate machinery until the medicine wears off and you can think clearly. Your doctor may tell you not to drive or operate machinery until the day after your test.     · Do not sign legal documents or make major decisions until the medicine wears off and you can think clearly. The anesthesia can make it hard for you to fully understand what you are agreeing to. Follow-up care is a key part of your treatment and safety. Be sure to make and go to all appointments, and call your doctor if you are having problems. It's also a good idea to know your test results and keep a list of the medicines you take. When should you call for help? Call 911 anytime you think you may need emergency care. For example, call if:    · You passed out (lost consciousness).     · You pass maroon or bloody stools.     · You have trouble breathing.    Call your doctor now or seek immediate medical care if:    · You have pain that does not get better after you take pain medicine.     · You are sick to your stomach or cannot drink fluids.     · You have new or worse belly pain.     · You have blood in your stools.     · You have a fever.     · You cannot pass stools or gas.    Watch closely for changes in your health, and be sure to contact your doctor if you have any problems.

## 2019-02-21 NOTE — INTERVAL H&P NOTE
H&P Update: 
Abeba Kaufman was seen and examined. History and physical has been reviewed. Significant clinical changes have occurred as noted:  CT showing possible mass in cecum. As such patient is being evaluated further via colonoscopy.  
 
Signed By: Ed De La Vega MD   
 February 21, 2019 12:38 PM

## 2019-02-21 NOTE — PROGRESS NOTES
TRANSFER - OUT REPORT: 
 
Verbal report given to Lisa Armstrong (name) on Sara Matthews  being transferred to preop (unit) for routine progression of care Report consisted of patients Situation, Background, Assessment and  
Recommendations(SBAR). Information from the following report(s) SBAR and Kardex was reviewed with the receiving nurse. Lines:  
Peripheral IV 02/20/19 Left Hand (Active) Site Assessment Clean, dry, & intact 2/21/2019  2:32 AM  
Phlebitis Assessment 0 2/21/2019  2:32 AM  
Infiltration Assessment 0 2/21/2019  2:32 AM  
Dressing Status Clean, dry, & intact 2/21/2019  2:32 AM  
Dressing Type Tape;Transparent 2/21/2019  2:32 AM  
Hub Color/Line Status Infusing 2/21/2019  2:32 AM  
  
 
Opportunity for questions and clarification was provided. Patient transported with: 
 Collect

## 2019-02-21 NOTE — ANESTHESIA POSTPROCEDURE EVALUATION
Procedure(s): 
COLONOSCOPY/18/ 358 COLON BIOPSY. Anesthesia Post Evaluation Multimodal analgesia: multimodal analgesia used between 6 hours prior to anesthesia start to PACU discharge Patient location during evaluation: bedside Patient participation: complete - patient participated Level of consciousness: awake and responsive to light touch Pain management: adequate Airway patency: patent Anesthetic complications: no 
Cardiovascular status: acceptable, hemodynamically stable, blood pressure returned to baseline and stable Respiratory status: acceptable, unassisted, spontaneous ventilation and nonlabored ventilation Hydration status: acceptable Post anesthesia nausea and vomiting:  controlled Visit Vitals /78 Pulse 83 Temp 35.7 °C (96.3 °F) Resp 16 Ht 5' 5.5\" (1.664 m) Wt 51.4 kg (113 lb 6.4 oz) SpO2 95% Breastfeeding? No  
BMI 18.58 kg/m²

## 2019-02-21 NOTE — PROCEDURES
COLONOSCOPY 
 
DATE of PROCEDURE: 2/21/2019 MEDICATION:  MAC INSTRUMENT: PCF INDICATION: Abnormal CT, weight loss, diarrhea PROCEDURE: After obtaining informed consent, the patient was placed in the left lateral position and sedated. The endoscope was advanced to the cecum where the appendiceal orifice and ileocecal valve were identified. On withdrawal, the colon was carefully visualized in a 360 degree fashion. Retroflexion was performed in the rectum. The patient was taken to the recovery area in stable condition. FINDINGS: 
Anus: normal 
Rectum: normal 
Sigmoid: Severe diverticular disease and resultant stricturing. Very difficult to navigate through. Descending Colon: normal 
Transverse Colon: Several polyps noted. Large ~4-5cm mass noted. Occupying ~2/3 of lumen. Multiple biopsies taken Ascending Colon/Cecum: Large circumferential >5mm mass noted. This occupied the majority of the ascending colon and cecum. The appendiceal orifice was identified. Multiple biopsies taken. Could not identify the ICV due to tumor burden. Terminal ileum: not entered Estimated blood loss: 0-minimal  
 
ASSESSMENT/PLAN: 
1. CEA level 2. 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 
3. If this patient were to undergo resection, she would need at least a R hemicolectomy. However given her liver met, this seems unlikely to be an option.  
4. Will follow up in the hospital. 
 
 
Karissa Messina MD 
[facility], [place]

## 2019-02-21 NOTE — PROCEDURES
THORACENTESIS  
02/21/19 Indication/Preprocedure diagnosis: LEFT Pleural effusion Volume of fluid withdrawn: 650ml Postprocedural Diagnosis:  Pleural effusion After obtaining informed consent the patient was placed sitting up on the side of the bed and using ultrasound guidance the pleural fluid was located on the LEFT side  and marked. The diaphragm and atelectatic lung were clearly visualized. The patient's skin was cleaned with ChloraPrep. Using sterile technique the skin was anesthetized with 10mL of 1% Xylocaine and a stab wound made and a catheter inserted into the pleural space with 650 cc of clear yellow-appearing fluid was removed. The patient tolerated the procedure well. The pleural fluid is sent for diagnostic studies (see orders). Ultrasound revealed no evidence of pneumothorax. There where no complications and negligible blood loss.   
 
Isabella Adam MD

## 2019-02-21 NOTE — PROGRESS NOTES
Hospitalist Progress Note Admit Date:  2019 11:29 AM  
Name:  Remedios Herzog Age:  80 y.o. 
:  1932 MRN:  [id number] PCP:  Constanza, Not On File Treatment Team: Attending Provider: Cherrie Billy MD; Care Manager: Julieta Ordaz Consulting Provider: TRACIE Hsu; Utilization Review: Lynda Smith RN; Utilization Review: Ilan Trinidad RN; Consulting Provider: Shaun Lucero MD; Consulting Provider: Boby Felton MD 
 
Subjective:  
80yr old female with pmhx sig for chf, htn, asthma. kidney cancer s/p resection, hx of a lung nodule - not known to be cancerous but s/p pneumonectomy. The patient states she has been unwell for months, with diarrhea for months. .. Last admitted to hospital in [address] [place] [address] in December after passing out at the [employer], she was given blood, scheduled for colonoscopy but poor prep so it was not done. She passed at her home in [address] [place] [address] on Saturday- daughter went to get her and brought her [place]. ... She wanted to go to the bank today. . But needed to use the bathroom on the way. . They stopped at [facility]-- the pt had a bm and passed out at the sink while washing her hands. .. Ems - called. .. In the er cxr concerning for pneumonia; lactic acid elevated. hospitalist asked to admit\" 
  
  
The patient was admitted. She was started on abx for the pneumonia. She was also transfused 2 units of PRBCS. She was seen by GI with plans for egd and colonoscopy once stable from a cardiology and resp point of view. Cardiology was consulted automatically in the admitting order set and have signed off. She has felt improved with diuresis and the blood. Her major concern continues the to be the diarrhea of unclear etiology. Cdiff is negative. She has no evidence of dig toxicity. She is allergic to BB with a history of swelling so these were not started.  Plan si for egd this week with gi- they have not decided on imaging for her altered anatomy or not at this time. 2/19/19 Pt going for egd  Today Daughter at bedside No complaints 2/20/19 Had egd yesterday GI ordered ct abd pelvis with contrast 
Pt says she is hungry otherwise doing ok 2/21/19 Pt c/o diarrhea from the prep for colonoscopy today  said she would do thoracentesis this am for left pleural effusion Objective:  
 
Patient Vitals for the past 24 hrs: 
 Temp Pulse Resp BP SpO2  
02/21/19 0715 95.8 °F (35.4 °C) 70 12 107/62 96 % 02/21/19 0321 97.6 °F (36.4 °C) 69 15 115/67 91 % 02/20/19 2306 97.5 °F (36.4 °C) 76 16 117/72 92 % 02/20/19 1925 96.1 °F (35.6 °C) 73 18 115/66 100 % 02/20/19 1609 96.7 °F (35.9 °C) 72 18 102/63 90 % 02/20/19 1203 97.8 °F (36.6 °C) 82 18 111/75 95 % Oxygen Therapy O2 Sat (%): 96 % (02/21/19 0715) Pulse via Oximetry: 82 beats per minute (02/14/19 0612) O2 Device: Room air (02/21/19 0715) No intake or output data in the 24 hours ending 02/21/19 1004 General:    Well nourished. Alert.  
heent- normal - blind left eye CV:   RRR. No murmur, rub, or gallop. Lungs:   Decrease air entry basal 
Abdomen:   Soft, nontender, nondistended. Obese Cns- no focal neurological deficits Extremities: Warm and dry. No cyanosis . Rt elbow region edema- prob dependent and mild bilateral lower ext- lower 1/3rd of both legs pitting edema. Skin:     No rashes or jaundice. Data Review: 
I have reviewed all labs, meds, telemetry events, and studies from the last 24 hours. Recent Results (from the past 24 hour(s)) MAGNESIUM Collection Time: 02/21/19  6:51 AM  
Result Value Ref Range Magnesium 2.1 1.8 - 2.4 mg/dL BNP Collection Time: 02/21/19  6:51 AM  
Result Value Ref Range  pg/mL METABOLIC PANEL, BASIC Collection Time: 02/21/19  6:51 AM  
Result Value Ref Range Sodium 143 136 - 145 mmol/L Potassium 4.7 3.5 - 5.1 mmol/L Chloride 111 (H) 98 - 107 mmol/L  
 CO2 25 21 - 32 mmol/L  Anion gap 7 mmol/L  
 Glucose 73 65 - 100 mg/dL BUN 11 8 - 23 MG/DL Creatinine 1.47 (H) 0.6 - 1.0 MG/DL  
 GFR est AA 43 (L) >60 ml/min/1.73m2 GFR est non-AA 36 ml/min/1.73m2 Calcium 8.2 (L) 8.3 - 10.4 MG/DL  
CBC WITH AUTOMATED DIFF Collection Time: 02/21/19  6:51 AM  
Result Value Ref Range WBC 11.4 (H) 4.3 - 11.1 K/uL  
 RBC 4.26 4.05 - 5.2 M/uL  
 HGB 11.4 (L) 11.7 - 15.4 g/dL HCT 37.2 35.8 - 46.3 % MCV 87.3 79.6 - 97.8 FL  
 MCH 26.8 26.1 - 32.9 PG  
 MCHC 30.6 (L) 31.4 - 35.0 g/dL  
 RDW 19.9 (H) 11.9 - 14.6 % PLATELET 251 777 - 923 K/uL MPV 10.1 9.4 - 12.3 FL ABSOLUTE NRBC 0.00 0.0 - 0.2 K/uL  
 DF AUTOMATED NEUTROPHILS 79 (H) 43 - 78 % LYMPHOCYTES 14 13 - 44 % MONOCYTES 4 4.0 - 12.0 % EOSINOPHILS 2 0.5 - 7.8 % BASOPHILS 0 0.0 - 2.0 % IMMATURE GRANULOCYTES 1 0.0 - 5.0 %  
 ABS. NEUTROPHILS 9.0 (H) 1.7 - 8.2 K/UL  
 ABS. LYMPHOCYTES 1.5 0.5 - 4.6 K/UL  
 ABS. MONOCYTES 0.5 0.1 - 1.3 K/UL  
 ABS. EOSINOPHILS 0.3 0.0 - 0.8 K/UL  
 ABS. BASOPHILS 0.0 0.0 - 0.2 K/UL  
 ABS. IMM. GRANS. 0.1 0.0 - 0.5 K/UL LD Collection Time: 02/21/19  6:51 AM  
Result Value Ref Range  (H) 110 - 210 U/L  
PROTEIN, TOTAL Collection Time: 02/21/19  6:51 AM  
Result Value Ref Range Protein, total 5.5 g/dL All Micro Results Procedure Component Value Units Date/Time CULTURE, BODY FLUID W Vandana Angel [[id number]] Order Status:  Sent Specimen:  Pleural Fluid CULTURE, BLOOD [[id number]] Collected:  02/13/19 1644 Order Status:  Completed Specimen:  Blood Updated:  02/18/19 1532 Special Requests: --     
  RIGHT JUGULAR VEIN Culture result: NO GROWTH 5 DAYS     
 CULTURE, BLOOD [[id number]] Collected:  02/13/19 1647 Order Status:  Completed Specimen:  Blood Updated:  02/18/19 1532 Special Requests: --     
  RIGHT 
ARM Culture result: NO GROWTH 5 DAYS     
 CULTURE, BLOOD [[id number]] Collected:  02/13/19 2103 Order Status:  Completed Specimen:  Blood Updated:  02/18/19 1532 Special Requests: --     
  RIGHT JUGULAR VEIN Culture result: NO GROWTH 5 DAYS     
 CULTURE, STOOL [[id number]] Collected:  02/14/19 1901 Order Status:  Completed Specimen:  Stool Updated:  02/17/19 6163 Special Requests: NO SPECIAL REQUESTS Culture result:    
  No Salmonella, Shigella, or Ecoli 0157 isolated. CULTURE, URINE [[id number]] Collected:  02/13/19 1556 Order Status:  Completed Specimen:  Urine Updated:  02/16/19 5072 Special Requests: NO SPECIAL REQUESTS Culture result: NO GROWTH 2 DAYS C. DIFFICILE AG & TOXIN A/B [[id number]] Collected:  02/14/19 1900 Order Status:  Completed Specimen:  Stool Updated:  02/15/19 1424 [id number] ANTIGEN    
  C. DIFFICILE GDH ANTIGEN-NEGATIVE  
     
  C. difficile toxin C. DIFFICILE TOXIN-NEGATIVE  
     
  PCR Reflex NOT APPLICABLE INTERPRETATION    
  NEGATIVE FOR TOXIGENIC C. DIFFICILE Clinical Consideration NEGATIVE FOR TOXIGENIC C. DIFFICILE  
     
 C. DIFFICILE AG & TOXIN A/B [[id number]] Collected:  02/14/19 1901 Order Status:  Canceled Specimen:  Stool Updated:  02/14/19 1929 Current Meds: 
Current Facility-Administered Medications Medication Dose Route Frequency  0.9% sodium chloride infusion  50 mL/hr IntraVENous CONTINUOUS  
 dextrose 40% (GLUTOSE) oral gel 1 Tube  15 g Oral PRN  
 glucagon (GLUCAGEN) injection 1 mg  1 mg IntraMUSCular PRN  
 dextrose (D50W) injection syrg 12.5-25 g  25-50 mL IntraVENous PRN  
 furosemide (LASIX) tablet 40 mg  40 mg Oral DAILY  sodium chloride (NS) flush 5-10 mL  5-10 mL IntraVENous PRN  
 oxyCODONE IR (ROXICODONE) tablet 5 mg  5 mg Oral Q6H PRN  
 ondansetron (ZOFRAN) injection 4 mg  4 mg IntraVENous Q4H PRN  
 acetaminophen (TYLENOL) tablet 650 mg  650 mg Oral Q6H PRN  pantoprazole (PROTONIX) tablet 40 mg  40 mg Oral ACB&D  
  magnesium oxide (MAG-OX) tablet 400 mg  400 mg Oral BID  dilTIAZem CD (CARDIZEM CD) capsule 120 mg  120 mg Oral DAILY  digoxin (LANOXIN) tablet 0.125 mg  0.125 mg Oral DAILY  aspirin chewable tablet 81 mg  81 mg Oral DAILY  piperacillin-tazobactam (ZOSYN) 4.5 g in 0.9% sodium chloride (MBP/ADV) 100 mL  4.5 g IntraVENous Q12H Other Studies (last 24 hours): No results found. Assessment and Plan:  
 
Hospital Problems as of 2/21/2019 Never Reviewed Codes Class Noted - Resolved POA Liver mass ICD-10-CM: R16.0 ICD-9-CM: 573.9  2/20/2019 - Present Unknown Cecal lesion ICD-10-CM: K63.9 ICD-9-CM: 569.89  2/20/2019 - Present Unknown Pneumonia ICD-10-CM: J18.9 ICD-9-CM: 641  2/13/2019 - Present Unknown [facility] ICD-10-CM: A41.9 ICD-9-CM: 038.9, 995.91  2/13/2019 - Present Unknown * (Principal) Syncope and collapse ICD-10-CM: R55 
ICD-9-CM: 780.2  2/13/2019 - Present Yes Diarrhea ICD-10-CM: R19.7 ICD-9-CM: 787.91  2/13/2019 - Present Yes Edema ICD-10-CM: R60.9 ICD-9-CM: 782.3  2/13/2019 - Present Yes Pleural effusion ICD-10-CM: J90 ICD-9-CM: 511.9  2/13/2019 - Present Yes Atrial fibrillation with rapid ventricular response (HCC) ICD-10-CM: I48.91 
ICD-9-CM: 427.31  4/27/2018 - Present Yes Systolic CHF, acute on chronic (HCC) (Chronic) ICD-10-CM: Y38.63 ICD-9-CM: 428.23, 428.0  4/27/2018 - Present Yes Solitary right kidney (Chronic) ICD-10-CM: Q60.0 ICD-9-CM: 753.0  4/27/2018 - Present Yes S/P lobectomy of lung (Chronic) ICD-10-CM: Z90.2 ICD-9-CM: V45.89  4/27/2018 - Present Yes  
   
 CKD (chronic kidney disease) stage 3, GFR 30-59 ml/min (HCC) (Chronic) ICD-10-CM: N18.3 ICD-9-CM: 585.3  4/27/2018 - Present Yes PLAN:   
Syncope - sec to symptomatic anemia 
pna - cont zosyn Effusion- multifactorial chf and PEM(decrease albumin)- Pulmonary consulted- will have thoracentesis today. Ascites- d/svetlana IR consult as pt is not symptomatic Liver and cecal mass- pt for colonoscopy today. afib with rvr- on ccb and digoxin-- h/o allergic reaction to beta blockers Heme occult positive- pt for egs today 
ckd 3- mild decrease in gfr- cont ivf. DC planning/Dispo: DVT ppx:  scd Signed: 
Sheila Nelson MD

## 2019-02-22 NOTE — PROGRESS NOTES
Problem: Nutrition Deficit Goal: *Optimize nutritional status Nutrition Follow Up: 
Reason for initial assessment:  
Consult received for General Nutrition Management and Supplements 2/15: Dr. Zora Boggs Referral received from nursing admission Malnutrition Screening Tool for recently lost 14-23# without trying and eating poorly due to chewing problems. Assessment:  
Diet order(s): 2/20-2/13:  Clear Liquids/NPO; 1 meal with full liquids (lunch 2/20) GI soft 2/21 - one meal, NPO 2/22 Pt presented with syncope.  Past medical history notable for AFib, CHF, HTN, CKD-stage 3, kidney cancer s/p resection, pneumonectomy, chronic diarrhea past few months with 6-7 loose stools/day; pt on chronic lasix. S/p colonoscopy large mass transverse colon 2/22 likely liver met on CT making stage IV. Hem/Onc consulted for suspected colon Ca with liver mets. Pt consistently with bloody briefs post colonoscopy. [facility] Pertinent Medications: 50ml/hr NS, lasix, MgOx, protonix, zosyn. Anthropometrics:Height: 5' 5.5\" (166.4 cm),  Weight: 52.4 kg (115 lb 9.6 oz), Weight Source: Bed, Body mass index is 18.94 kg/m². BMI class of underweight for Older American Women. Weight:  2/19:  51.4 kg- weight source not specified - no new weight available 
  
Macronutrient Needs: EER:  9222-5102 kcal /day (30-35 kcal/kg BW) EPR:  47-52 grams protein/day (0.9-1 grams/kg BW) GFR 43 Intake/Comparative Standards:  Clear liquid/NPO diet transitions for GI workup do not meet estimated kcal or protein needs.  
  
Intervention: 
Meals and snacks: Recommend diet progression as medically approrpaite s/p procedures today. Pt is Day 9 with nutrition primarily from npo/cld with an occasional full liquid and GI soft diet. Hx remarkable for significant weight loss and inadequate oral intake PTA.   If unable to progress oral diet recommend nutrition support at this juncture likely to require TPN for primary needs dependent on goals of care necessitating central line currently only with peripheral access. Discharge nutrition plan: Too soon to determine. Coordination of Nutrition Care: Clarisa Alfred RN, SRIRAM Chicas. Demotte Jaison [address]

## 2019-02-22 NOTE — PROGRESS NOTES
PM assessment complete. A&O. Respirations even and unlabored. Lung sounds clear. Bowel sounds active in all 4 quadrants. IVF currently infusing. Daughter at bedside. No complaints at this time. Bed in locked/lowest position. Call light within reach. Will continue to monitor.

## 2019-02-22 NOTE — PROGRESS NOTES
Dr. Laura Coello notified of consistently bloody briefs post colonoscopy today. Multiple biopsies were taken. H&H is normal. Pt is A&O. IVF infusing. No orders given at this time. Will notify Dr. Natalya Shell with GI if amount of blood increases.

## 2019-02-22 NOTE — REHAB NOTE
PT note: checked on patient for therapy this afternoon but she requested we return in the AM since she had recently gotten back for going downtown for an EGD.  
Yan Marroquin, PT

## 2019-02-22 NOTE — PROGRESS NOTES
GI DAILY PROGRESS NOTE Admit Date:  2/13/2019 Today's Date:  2/22/2019 CC:  Diarrhea Subjective:  
 
Patient with no diarrhea today following colonoscopy yesterday. Had some rectal bleeding after colonoscopy last evening but none today. Appetite fair. Daughter at bedside. Had abd paracentesis today today at [facility] with 200 ml thin yellow fluid removed but liver biopsy not performed. Esophagogastroduodenoscopy DATE of PROCEDURE: 2/19/2019 FINDINGS: 
ESOPHAGUS:  The esophagus was intubated carefully and was noted to be tortuous in the upper esophageal area. There is a 3cm hiatal hernia noted with widely patent GE junction. On withdrawal of the scope it was noted that there was mucosal tear at 15cm consistent with dilation of a benign stricture. Further dilation would not be safe given extent of tear with just EGD scope. No significant bleeding noted. STOMACH:  Normal and biopsies taken for h pylori DUODENUM: Normal  
Estimated blood loss: 0-minimal   PLAN: 
1. Advance to full liquids and monitor weight in 4 to 6 weeks. Advance to soft diet in 24 hours 2. Patient would not be able to prep for and withstand a colonoscopy in my opinion and EGD findings are enough to explain weight loss and GERD complaints. However CEA was checked and is 97 -  Proceed with CT Scan abd/pelvis with IV and PO contrast. 
3. PPI 40mg once daily 4. Will schedule office visit in 4 weeks.  
Eric Quick MD 
 
COLONOSCOPY  2/21/2019  
MEDICATION:  MAC    
INSTRUMENT: PCF  
INDICATION: Abnormal CT, weight loss, diarrhea FINDINGS: 
Anus: normal 
Rectum: normal 
Sigmoid: Severe diverticular disease and resultant stricturing. Very difficult to navigate through. Descending Colon: normal 
Transverse Colon: Several polyps noted. Large ~4-5cm mass noted. Occupying ~2/3 of lumen. Multiple biopsies taken Ascending Colon/Cecum: Large circumferential >5mm mass noted.   This occupied the majority of the ascending colon and cecum. The appendiceal orifice was identified. Multiple biopsies taken. Could not identify the ICV due to tumor burden. Terminal ileum: not entered ASSESSMENT/PLAN: 
1. CEA level 2. 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 
3. If this patient were to undergo resection, she would need at least a R hemicolectomy. However given her liver met, this seems unlikely to be an option. 4. Will follow up in the hospital.  
Omer Leal MD 
 
 
Medications:  
Current Facility-Administered Medications Medication Dose Route Frequency  [START ON 2/23/2019] aspirin chewable tablet 81 mg  81 mg Oral DAILY  [START ON 2/23/2019] digoxin (LANOXIN) tablet 0.125 mg  0.125 mg Oral DAILY  [START ON 2/23/2019] dilTIAZem CD (CARDIZEM CD) capsule 120 mg  120 mg Oral DAILY  [START ON 2/23/2019] furosemide (LASIX) tablet 40 mg  40 mg Oral DAILY  magnesium oxide (MAG-OX) tablet 400 mg  400 mg Oral BID  pantoprazole (PROTONIX) tablet 40 mg  40 mg Oral ACB&D  piperacillin-tazobactam (ZOSYN) 4.5 g in 0.9% sodium chloride (MBP/ADV) 100 mL  4.5 g IntraVENous Q8H  
 0.9% sodium chloride infusion  50 mL/hr IntraVENous CONTINUOUS  
 acetaminophen (TYLENOL) tablet 650 mg  650 mg Oral Q6H PRN  
 dextrose (D50W) injection syrg 12.5-25 g  25-50 mL IntraVENous PRN  
 dextrose 40% (GLUTOSE) oral gel 1 Tube  15 g Oral PRN  
 glucagon (GLUCAGEN) injection 1 mg  1 mg IntraMUSCular PRN  
 ondansetron (ZOFRAN) injection 4 mg  4 mg IntraVENous Q4H PRN  
 oxyCODONE IR (ROXICODONE) tablet 5 mg  5 mg Oral Q6H PRN  
 sodium chloride (NS) flush 5-10 mL  5-10 mL IntraVENous PRN Review of Systems: ROS was obtained, with pertinent positives as listed above. No chest pain or SOB. Diet:  Gi soft Objective:  
Vitals: 
Visit Vitals /68 (BP 1 Location: Left arm, BP Patient Position: At rest) Pulse 71 Temp 95.5 °F (35.3 °C) Resp 16  
 Ht 5' 5.5\" (1.664 m) Wt 51.4 kg (113 lb 6.4 oz) SpO2 100% Breastfeeding? No  
BMI 18.58 kg/m² Intake/Output: 
No intake/output data recorded. No intake/output data recorded. Exam: 
General appearance: alert, cooperative, no distress; thin body habitus Lungs: clear to auscultation bilaterally anteriorly Heart: regular rate and rhythm Abdomen: soft, non-tender. Bowel sounds normal. No masses, no organomegaly Extremities: extremities normal, atraumatic, no cyanosis or edema Neuro:  alert and oriented Data Review (Labs):   
Recent Labs  
  02/22/19 
6613 02/22/19 
2394 02/21/19 
3426 02/20/19 
4868 WBC 11.4*  --  11.4* 11.5* HGB 10.1*  --  11.4* 11.5* HCT 33.0*  --  37.2 38.2   --  286 270 MCV 87.5  --  87.3 87.2 NA  --  143 143 143 K  --  3.4* 4.7 3.9 CL  --  111* 111* 111* CO2  --  23 25 23 BUN  --  9 11 12 CREA  --  1.40* 1.47* 1.30* CA  --  7.5* 8.2* 7.8*  
MG  --  1.9 2.1 2.0  
GLU  --  74 73 54* TP  --   --  5.5  --   
 
RIGHT UPPER QUADRANT ULTRASOUND 2/22/19  
HISTORY: Liver mass on abdominal CT scan.  
COMPARISON: No relevant comparison studies available.  FINDINGS: The mass in the right lobe of liver appears solid with a hypoechoic 
rim which is not calcified. There is also a small simple cyst in the liver, less 
than 1 cm. Echogenic bile without discrete gallstones. The gallbladder wall is 
borderline thickened although there is a small amount of ascites. The common 
bile duct is not dilated, 7 mm. Intrahepatic biliary tree is not dilated. Included portion of the pancreas and right kidney are unremarkable.  IMPRESSION IMPRESSION: Solid mass right lobe the liver suspicious for neoplasm. No 
calcification suspected. Echogenic bile (sludge) and borderline gallbladder wall 
thickening although there is a small amount of ascites. CT of the Abdomen and Pelvis with contrast 2/20/19 INDICATION:  Weight loss. Gastroesophageal reflux.  Elevated CEA  
 IMPRESSION IMPRESSION: Peripherally dense liver dome 2.2 cm mass with adjacent 
hypervascularity. The peripheral density may represent calcification or contrast 
enhancement. Noncontrast imaging would be beneficial. HCC is possible. 2. Large volume ascites with anasarca. Large left pleural effusion. Right 
pleural fluid related to right pneumonectomy. 3. Distended gallbladder. 4. Absent left kidney. 5. Colonic diverticulosis. Addendum: Addendum: On second review, there appears to be a colocolonic 
intussusception involving the proximal transverse large bowel. The lead point 
might be a cecal mass. The referring physician was aware of these findings prior 
to this addendum. In light of this, the liver dome lesion may represent 
metastatic disease. Assessment:  
 
Principal Problem: 
  Syncope and collapse (2/13/2019) Active Problems: 
  Atrial fibrillation with rapid ventricular response ([address] (4/27/2018) Systolic CHF, acute on chronic ([address] (4/27/2018) Solitary right kidney (4/27/2018) S/P lobectomy of lung (4/27/2018) CKD (chronic kidney disease) stage 3, GFR 30-59 ml/min (HCC) (4/27/2018) Pneumonia (2/13/2019) Sepsis ([address] (2/13/2019) Diarrhea (2/13/2019) Edema (2/13/2019) Pleural effusion (2/13/2019) Liver mass (2/20/2019) Cecal lesion (2/20/2019) 79 y/o female with h/o CHF (EF 35-40% per recent ECHO), moderate MR, mild TR, HTN, asthma, kidney cancer s/p resection 2016, a. Fib, hx lung nodule s/p pneumonectomy admitted with syncope, diagnosed with pneumonia, elevated lactate and volume overload and seen by GI for abdominal pain, diarrhea (x 3 months), weight loss and drop in hemoglobin with normocytic anemia. Iron studies are not consistent with iron deficiency.  Stool culture and c. Diff negative. Received 2 units PRBCs.  Heme positive stool. EGD 2/19 with no source for anemia or weight loss found.   CEA elevated at 97. CT abd/pelvis 2/20 with peripherally dense liver dome 2.2 cm mass with adjacent 
hypervascularity, large volume ascites with anasarca, large left pleural effusion, right pleural fluid related to right pneumonectomy, distended gallbladder, absent left kidney, colonic diverticulosis and colo-colonic intussusception involving the proximal transverse large bowel; the lead point might be a cecal mass. Colonoscopy 2/21 with large circumferential >5mm mass noted which occupied the majority of the ascending colon and cecum. The appendiceal orifice was identified. Multiple biopsies taken. Could not identify the ICV due to tumor burden. Hematology/Oncology has been consulted and patient underwent paracentesis this morning with fluid studies ordered, as well as ultrasound, which revealed right lobe mass, suspicious for malignancy. Plan: 1. Diet as tolerated, encourage to avoid hard or dry foods secondary to dysphagia 2. Await colonoscopy pathology results; mass not resectable. Diarrhea likely secondary to mass but more solid stool would not be able to traverse the area of colon mass and could cause obstruction 3. Ascitic fluid studies pending 4. Thoracentesis fluid with no atypia of pleural fluid 5. Liver biopsy has not yet been obtained 6. Heme/onc to see outpatient pending above studies to determine further appropriate options for treatment 7. We will sign off. Please call as needed. Patient is seen and examined in collaboration with Dr. Conrad Jones. Assessment and plan as per Dr. Conrad Jones.  
Fransico Leiva NP

## 2019-02-22 NOTE — PROGRESS NOTES
Hospitalist Progress Note Admit Date:  2019 11:29 AM  
Name:  Reed Swann Age:  80 y.o. 
:  1932 MRN:  [id number] PCP:  [facility]anza, Not On File Treatment Team: Care Manager: Kwesi Coker; Consulting Provider: TRACIE Giles; Utilization Review: Zhane Garcia RN; Utilization Review: Alberto Sylvester RN; Consulting Provider: James Pascal MD; Consulting Provider: Endy Mcconnell MD; Care Manager: Rocco Lovelace RN; Consulting Provider: Nimisha Abbott MD 
 
Subjective:  
80yr old female with pmhx sig for chf, htn, asthma. kidney cancer s/p resection, hx of a lung nodule - not known to be cancerous but s/p pneumonectomy. The patient states she has been unwell for months, with diarrhea for months. .. Last admitted to hospital in [address] [place] [address] in December after passing out at the [employer], she was given blood, scheduled for colonoscopy but poor prep so it was not done. She passed at her home in [address] [place] [address] on Saturday- daughter went to get her and brought her [place]. ... She wanted to go to the bank today. . But needed to use the bathroom on the way. . They stopped at [facility]-- the pt had a bm and passed out at the sink while washing her hands. .. Ems - called. .. In the er cxr concerning for pneumonia; lactic acid elevated. hospitalist asked to admit\" 
  
  
The patient was admitted. She was started on abx for the pneumonia. She was also transfused 2 units of PRBCS. She was seen by GI with plans for egd and colonoscopy once stable from a cardiology and resp point of view. Cardiology was consulted automatically in the admitting order set and have signed off. She has felt improved with diuresis and the blood. Her major concern continues the to be the diarrhea of unclear etiology. Cdiff is negative. She has no evidence of dig toxicity. She is allergic to BB with a history of swelling so these were not started.  Plan si for egd this week with gi- they have not decided on imaging for her altered anatomy or not at this time. 2/19/19 Pt going for egd  Today Daughter at bedside No complaints 2/20/19 Had egd yesterday GI ordered ct abd pelvis with contrast 
Pt says she is hungry otherwise doing ok 2/21/19 Pt c/o diarrhea from the prep for colonoscopy today  said she would do thoracentesis this am for left pleural effusion 2/22/19 Pt had paracentesis and colonoscopy yesterday C/o mild blood tinge in the pamper Says had a good night Objective:  
 
Patient Vitals for the past 24 hrs: 
 Temp Pulse Resp BP SpO2  
02/22/19 0715 96 °F (35.6 °C) 73 12 112/66 100 % 02/22/19 0418 96.4 °F (35.8 °C) 78 15 111/63 99 % 02/21/19 2313 95.4 °F (35.2 °C) 72 14 104/67 98 % 02/21/19 1945 96.8 °F (36 °C) 78 14 96/79 98 % 02/21/19 1607     100 % 02/21/19 1401  83 16 151/78 95 % 02/21/19 1346  87 16 125/68 95 % 02/21/19 1341  95 16 137/72 98 % 02/21/19 1336  76 16 136/70   
02/21/19 1335  82 14 127/80 98 % 02/21/19 1242  72 14 130/54 98 % 02/21/19 1055     100 % 02/21/19 1021 96.3 °F (35.7 °C) 69 12 106/57 95 % Oxygen Therapy O2 Sat (%): 100 % (02/22/19 0715) Pulse via Oximetry: 79 beats per minute (02/21/19 1607) O2 Device: Nasal cannula (02/22/19 0715) O2 Flow Rate (L/min): 2 l/min(weaned from 3L) (02/21/19 1607) No intake or output data in the 24 hours ending 02/22/19 3510 General:    Well nourished. Alert.  
heent- normal - blind left eye CV:   RRR. No murmur, rub, or gallop. Lungs:   Improved air entry basal 
Abdomen:   Soft, nontender, nondistended. Obese Cns- no focal neurological deficits Extremities: Warm and dry. No cyanosis . Rt elbow region edema- prob dependent and mild bilateral lower ext- lower 1/3rd of both legs pitting edema. Skin:     No rashes or jaundice. Data Review: 
I have reviewed all labs, meds, telemetry events, and studies from the last 24 hours. Recent Results (from the past 24 hour(s)) LDH, BODY FLUID Collection Time: 02/21/19  9:45 AM  
Result Value Ref Range Fluid Type: PLEURAL FLUID    
 LD, body fld. RESULTS SCANNED IN CONNECT [facility] U/L  
PROTEIN TOTAL, FLUID Collection Time: 02/21/19  9:45 AM  
Result Value Ref Range Fluid Type: PLEURAL FLUID Protein total, body fld. RESULTS SCANNED IN [facility] g/dL GLUCOSE, FLUID Collection Time: 02/21/19  9:45 AM  
Result Value Ref Range Fluid Type: PLEURAL FLUID Glucose, body fld. RESULTS SCANNED IN [facility] MG/DL  
CELL COUNT, BODY FLUID Collection Time: 02/21/19  9:45 AM  
Result Value Ref Range BODY FLUID TYPE PLEURAL FLUID FLUID COLOR STRAW    
 FLUID APPEARANCE CLEAR    
 FLUID RBC CT. <1,000 /cu mm FLUID WBC COUNT 211 /cu mm  
 FLD NEUTROPHILS 12 % FLD LYMPHS 86 % FLD MONO/MACROPHAGES 2 % FLOW CYTOMETRY, MISC Collection Time: 02/21/19  9:45 AM  
Result Value Ref Range Test ordered: COMPREHENSIVE ANALYSIS Source MANUAL FLUID - [facility] Flow Cytometry PENDING   
MAGNESIUM Collection Time: 02/22/19  8:21 AM  
Result Value Ref Range Magnesium 1.9 1.8 - 2.4 mg/dL METABOLIC PANEL, BASIC Collection Time: 02/22/19  8:21 AM  
Result Value Ref Range Sodium 143 136 - 145 mmol/L Potassium 3.4 (L) 3.5 - 5.1 mmol/L Chloride 111 (H) 98 - 107 mmol/L  
 CO2 23 21 - 32 mmol/L Anion gap 9 mmol/L Glucose 74 65 - 100 mg/dL BUN 9 8 - 23 MG/DL Creatinine 1.40 (H) 0.6 - 1.0 MG/DL  
 GFR est AA 46 (L) >60 ml/min/1.73m2 GFR est non-AA 38 ml/min/1.73m2 Calcium 7.5 (L) 8.3 - 10.4 MG/DL  
CBC WITH AUTOMATED DIFF Collection Time: 02/22/19  8:39 AM  
Result Value Ref Range WBC 11.4 (H) 4.3 - 11.1 K/uL  
 RBC 3.77 (L) 4.05 - 5.2 M/uL  
 HGB 10.1 (L) 11.7 - 15.4 g/dL HCT 33.0 (L) 35.8 - 46.3 % MCV 87.5 79.6 - 97.8 FL  
 MCH 26.8 26.1 - 32.9 PG  
 MCHC 30.6 (L) 31.4 - 35.0 g/dL  
 RDW 19.9 (H) 11.9 - 14.6 % PLATELET 180 970 - 685 K/uL MPV 9.9 9.4 - 12.3 FL ABSOLUTE NRBC 0.00 0.0 - 0.2 K/uL  
 DF AUTOMATED NEUTROPHILS 78 43 - 78 % LYMPHOCYTES 13 13 - 44 % MONOCYTES 5 4.0 - 12.0 % EOSINOPHILS 3 0.5 - 7.8 % BASOPHILS 0 0.0 - 2.0 % IMMATURE GRANULOCYTES 1 0.0 - 5.0 %  
 ABS. NEUTROPHILS 8.9 (H) 1.7 - 8.2 K/UL  
 ABS. LYMPHOCYTES 1.4 0.5 - 4.6 K/UL  
 ABS. MONOCYTES 0.6 0.1 - 1.3 K/UL  
 ABS. EOSINOPHILS 0.3 0.0 - 0.8 K/UL  
 ABS. BASOPHILS 0.1 0.0 - 0.2 K/UL  
 ABS. IMM. GRANS. 0.1 0.0 - 0.5 K/UL All Micro Results Procedure Component Value Units Date/Time CULTURE, BODY FLUID Nima Roland [[id number]] Collected:  02/21/19 0945 Order Status:  Completed Specimen:  Pleural Fluid Updated:  02/21/19 1340 CULTURE, BLOOD [[id number]] Collected:  02/13/19 1644 Order Status:  Completed Specimen:  Blood Updated:  02/18/19 1532 Special Requests: --     
  RIGHT JUGULAR VEIN Culture result: NO GROWTH 5 DAYS     
 CULTURE, BLOOD [[id number]] Collected:  02/13/19 1647 Order Status:  Completed Specimen:  Blood Updated:  02/18/19 1532 Special Requests: --     
  RIGHT 
ARM Culture result: NO GROWTH 5 DAYS     
 CULTURE, BLOOD [[id number]] Collected:  02/13/19 2103 Order Status:  Completed Specimen:  Blood Updated:  02/18/19 1532 Special Requests: --     
  RIGHT JUGULAR VEIN Culture result: NO GROWTH 5 DAYS     
 CULTURE, STOOL [[id number]] Collected:  02/14/19 1901 Order Status:  Completed Specimen:  Stool Updated:  02/17/19 7034 Special Requests: NO SPECIAL REQUESTS Culture result:    
  No Salmonella, Shigella, or Ecoli 0157 isolated. CULTURE, URINE [[id number]] Collected:  02/13/19 1556 Order Status:  Completed Specimen:  Urine Updated:  02/16/19 5760 Special Requests: NO SPECIAL REQUESTS Culture result: NO GROWTH 2 DAYS C. DIFFICILE AG & TOXIN A/B [[id number]] Collected:  02/14/19 1900 Order Status:  Completed Specimen:  Stool Updated:  02/15/19 1424 [id number] ANTIGEN    
  C. DIFFICILE GDH ANTIGEN-NEGATIVE  
     
  C. difficile toxin C. DIFFICILE TOXIN-NEGATIVE  
     
  PCR Reflex NOT APPLICABLE INTERPRETATION    
  NEGATIVE FOR TOXIGENIC C. DIFFICILE Clinical Consideration NEGATIVE FOR TOXIGENIC C. DIFFICILE  
     
 C. DIFFICILE AG & TOXIN A/B [[id number]] Collected:  02/14/19 1901 Order Status:  Canceled Specimen:  Stool Updated:  02/14/19 1929 Current Meds: 
Current Facility-Administered Medications Medication Dose Route Frequency  lactated Ringers infusion  100 mL/hr IntraVENous CONTINUOUS  
 potassium chloride 20 mEq in 100 ml IVPB  20 mEq IntraVENous Q2H  
 0.9% sodium chloride infusion  50 mL/hr IntraVENous CONTINUOUS  
 dextrose 40% (GLUTOSE) oral gel 1 Tube  15 g Oral PRN  
 glucagon (GLUCAGEN) injection 1 mg  1 mg IntraMUSCular PRN  
 dextrose (D50W) injection syrg 12.5-25 g  25-50 mL IntraVENous PRN  
 furosemide (LASIX) tablet 40 mg  40 mg Oral DAILY  sodium chloride (NS) flush 5-10 mL  5-10 mL IntraVENous PRN  
 oxyCODONE IR (ROXICODONE) tablet 5 mg  5 mg Oral Q6H PRN  
 ondansetron (ZOFRAN) injection 4 mg  4 mg IntraVENous Q4H PRN  
 acetaminophen (TYLENOL) tablet 650 mg  650 mg Oral Q6H PRN  pantoprazole (PROTONIX) tablet 40 mg  40 mg Oral ACB&D  
 magnesium oxide (MAG-OX) tablet 400 mg  400 mg Oral BID  dilTIAZem CD (CARDIZEM CD) capsule 120 mg  120 mg Oral DAILY  digoxin (LANOXIN) tablet 0.125 mg  0.125 mg Oral DAILY  aspirin chewable tablet 81 mg  81 mg Oral DAILY  piperacillin-tazobactam (ZOSYN) 4.5 g in 0.9% sodium chloride (MBP/ADV) 100 mL  4.5 g IntraVENous Q12H Current Outpatient Medications Medication Sig  digoxin (LANOXIN) 0.125 mg tablet Take 0.125 mg by mouth daily.   
 albuterol (PROVENTIL HFA, VENTOLIN HFA, PROAIR HFA) 90 mcg/actuation inhaler Take 2 Puffs by inhalation every six (6) hours as needed for Wheezing.  ferrous sulfate 325 mg (65 mg iron) tablet Take 1 Tab by mouth daily (with breakfast).  aspirin 81 mg chewable tablet Take 81 mg by mouth daily.  omeprazole (PRILOSEC) 40 mg capsule Take 40 mg by mouth daily.  furosemide (LASIX) 40 mg tablet Take  by mouth daily.  simvastatin (ZOCOR) 20 mg tablet Take 20 mg by mouth nightly.  dilTIAZem (CARDIZEM) 120 mg tablet Take 120 mg by mouth daily.  lisinopril (PRINIVIL, ZESTRIL) 5 mg tablet Take 5 mg by mouth daily.  loperamide (IMMODIUM) 2 mg tablet Take 2 mg by mouth four (4) times daily as needed for Diarrhea. Indications: Diarrhea  calcium carbonate (TUMS) 200 mg calcium (500 mg) chew Take 1 Tab by mouth every six (6) hours as needed. Indications: Heartburn Other Studies (last 24 hours): No results found. Assessment and Plan:  
 
Hospital Problems as of 2/22/2019 Never Reviewed Codes Class Noted - Resolved POA Liver mass ICD-10-CM: R16.0 ICD-9-CM: 573.9  2/20/2019 - Present Unknown Cecal lesion ICD-10-CM: K63.9 ICD-9-CM: 569.89  2/20/2019 - Present Unknown Pneumonia ICD-10-CM: J18.9 ICD-9-CM: 880  2/13/2019 - Present Unknown Sepsis ([facility] [address] ICD-10-CM: A41.9 ICD-9-CM: 038.9, 995.91  2/13/2019 - Present Unknown * (Principal) Syncope and collapse ICD-10-CM: R55 
ICD-9-CM: 780.2  2/13/2019 - Present Yes Diarrhea ICD-10-CM: R19.7 ICD-9-CM: 787.91  2/13/2019 - Present Yes Edema ICD-10-CM: R60.9 ICD-9-CM: 782.3  2/13/2019 - Present Yes Pleural effusion ICD-10-CM: J90 ICD-9-CM: 511.9  2/13/2019 - Present Yes Atrial fibrillation with rapid ventricular response (HCC) ICD-10-CM: I48.91 
ICD-9-CM: 427.31  4/27/2018 - Present Yes Systolic CHF, acute on chronic (HCC) (Chronic) ICD-10-CM: L84.58 ICD-9-CM: 428.23, 428.0  4/27/2018 - Present Yes Solitary right kidney (Chronic) ICD-10-CM: Q60.0 ICD-9-CM: 753.0  4/27/2018 - Present Yes S/P lobectomy of lung (Chronic) ICD-10-CM: Z90.2 ICD-9-CM: V45.89  4/27/2018 - Present Yes  
   
 CKD (chronic kidney disease) stage 3, GFR 30-59 ml/min (HCC) (Chronic) ICD-10-CM: N18.3 ICD-9-CM: 585.3  4/27/2018 - Present Yes PLAN:   
Syncope - sec to symptomatic anemia 
pna - cont zosyn Effusion- multifactorial chf and PEM(decrease albumin)- s/o thoracentesis- 650 ml taken left lung 2/21/19 Ascites-  
Liver and cecal mass-had colonoscopy 2/21/19- biopsies taken, pt for liver biopsy 2/22/19 as requested by oncology 
afib with rvr- on ccb and digoxin-- h/o allergic reaction to beta blockers Heme occult positive- had egd during the stay 
ckd 3- cont ivf. Full code DC planning/Dispo: DVT ppx:  scd Signed: 
Kandace Colunga MD

## 2019-02-22 NOTE — PROGRESS NOTES
Sara Matthews Admission Date: 2/13/2019 Daily Progress Note: 2/22/2019 The patient's chart is reviewed and the patient is discussed with the staff. Sara Matthews is a 80 y.o. female, a never smoker, with h/o CHF, HTN, asthma, pulmonary nodule (benign per family) s/p R pneumonectomy about 23years ago in [facility], nephrectomy in 2016 for [address], who was admitted to [address] [place] [address] on 2/13/19 with frequent diarrhea and weight loss with syncope. She has undergone EGD with esophageal stricture noted and CT abd/pelvis was notable for colocolonic intussusception in prox large intestine concerning for malignancy and she is planned for colonoscopy.  
  
Additionally, there is a moderate left pleural effusion on seen on CT abd/pelvis. Subjective: She had thoracentesis yesterday On RA Seen in IR Less sob Had paracentesis in IR and 200 ml were removed No current facility-administered medications for this encounter. Review of Systems Constitutional: negative for fever, chills, sweats Cardiovascular: negative for chest pain, palpitations, syncope, edema Gastrointestinal:  negative for dysphagia, reflux, vomiting, diarrhea, abdominal pain, or melena Neurologic:  negative for focal weakness, numbness, headache Objective:  
 
Vitals:  
 02/21/19 2313 02/22/19 0418 02/22/19 0715 02/22/19 1336 BP: 104/67 111/63 112/66 126/68 Pulse: 72 78 73 71 Resp: 14 15 12 16 Temp: 95.4 °F (35.2 °C) 96.4 °F (35.8 °C) 96 °F (35.6 °C) 95.5 °F (35.3 °C) SpO2: 98% 99% 100% 100% Weight:      
Height:      
 
Intake and Output:  
No intake/output data recorded. No intake/output data recorded. Physical Exam:  
Constitution:  the patient is well developed and in no acute distress EENMT:  Sclera clear, pupils equal, oral mucosa moist 
Respiratory:  Decreased BS in the bases Cardiovascular:  RRR without M,G,R 
 Gastrointestinal: soft and non-tender; with positive bowel sounds. Musculoskeletal: warm without cyanosis. There is less lower leg edema. Skin:  no jaundice or rashes, no wounds Neurologic: no gross neuro deficits Psychiatric:  alert and oriented x 3 CXR:  
 
 
LAB No results for input(s): GLUCPOC in the last 72 hours. No lab exists for component: [place] Recent Labs  
  02/22/19 
9263 02/21/19 
2622 02/20/19 
3090 WBC 11.4* 11.4* 11.5* HGB 10.1* 11.4* 11.5* HCT 33.0* 37.2 38.2  286 270 Recent Labs  
  02/22/19 
8516 02/21/19 
8106 02/20/19 
0628  143 143  
K 3.4* 4.7 3.9 * 111* 111* CO2 23 25 23 GLU 74 73 54* BUN 9 11 12 CREA 1.40* 1.47* 1.30* MG 1.9 2.1 2.0  
CA 7.5* 8.2* 7.8* No results for input(s): PH, PCO2, PO2, HCO3, PHI, PCO2I, PO2I, HCO3I in the last 72 hours. No results for input(s): LCAD, LAC in the last 72 hours. Results for Austin Acuña (MRN [id number]) as of 2/22/2019 08:20 Ref. Range 2/21/2019 09:45 BODY FLUID TYPE Latest Units:   PLEURAL FLUID FLUID APPEARANCE Latest Units:   CLEAR  
FLUID COLOR Latest Units:   STRAW  
FLUID RBC CT. Latest Units: /cu mm <1,000 FLUID WBC COUNT Latest Units: /cu mm 211 FLD NEUTROPHILS Latest Units: % 12 FLD LYMPHS Latest Units: % 86 FLD MONO/MACROPHAGES Latest Units: % 2 Fluid Type: Latest Units:   PLEURAL FLUID Protein total, body fld. Latest Units: g/dL RESULTS SCANNED I... Fluid Type: Latest Units:   PLEURAL FLUID Glucose, body fld. Latest Units: MG/DL RESULTS SCANNED I... Fluid Type: Latest Units:   PLEURAL FLUID  
LD, body fld. Latest Units: U/L RESULTS SCANNED I... Assessment:  (Medical Decision Making) Hospital Problems  Never Reviewed Codes Class Noted POA Liver mass ICD-10-CM: R16.0 ICD-9-CM: 573.9  2/20/2019 Unknown Cecal lesion ICD-10-CM: K63.9 ICD-9-CM: 569.89  2/20/2019 Unknown Pneumonia ICD-10-CM: J18.9 ICD-9-CM: 931  2/13/2019 Unknown Sepsis ([facility] [address] ICD-10-CM: A41.9 ICD-9-CM: 038.9, 995.91  2/13/2019 Unknown * (Principal) Syncope and collapse ICD-10-CM: R55 
ICD-9-CM: 780.2  2/13/2019 Yes Diarrhea ICD-10-CM: R19.7 ICD-9-CM: 787.91  2/13/2019 Yes Edema ICD-10-CM: R60.9 ICD-9-CM: 782.3  2/13/2019 Yes Pleural effusion ICD-10-CM: J90 ICD-9-CM: 511.9  2/13/2019 Yes Atrial fibrillation with rapid ventricular response (HCC) ICD-10-CM: I48.91 
ICD-9-CM: 427.31  4/27/2018 Yes Systolic CHF, acute on chronic (HCC) (Chronic) ICD-10-CM: I32.56 ICD-9-CM: 428.23, 428.0  4/27/2018 Yes Solitary right kidney (Chronic) ICD-10-CM: Q60.0 ICD-9-CM: 753.0  4/27/2018 Yes S/P lobectomy of lung (Chronic) ICD-10-CM: Z90.2 ICD-9-CM: V45.89  4/27/2018 Yes CKD (chronic kidney disease) stage 3, GFR 30-59 ml/min (HCC) (Chronic) ICD-10-CM: N18.3 ICD-9-CM: 585.3  4/27/2018 Yes Plan:  (Medical Decision Making) --doubt pneumonia/sepsis --follow cytology fluids  
--follow labs and CXR 
--will see again on Monday More than 50% of the time documented was spent in face-to-face contact with the patient and in the care of the patient on the floor/unit where the patient is located.  
 
Philippe Salazar MD

## 2019-02-23 NOTE — PROGRESS NOTES
PT NOTE: Attempted to see patient X 3 today. She was having procedure then eating and declined on third attempt due to not feeling well. Will try back tomorrow.

## 2019-02-23 NOTE — PROGRESS NOTES
Patient's daughter met with JONATHAN to discuss discharge plan. Patient is to return home with her, but had questions about a hospital bed. SW advised that one could be ordered and the fee if insurance does not cover it. Patient to be seen by palliative care. SW educated pt's daughter on palliative care vs. Hospice, and in home palliative care services. Patient's daughter also asking about being paid to care for her mother as she's missed a lot of work. SW educated pt's daughter on CLTC and offered to look into submitting an application. Pt'd daughter provided with contact information for KATARINA to help her transfer her mother's Medicaid from [facility] to [facility] as she will not be returning. ELLIOTT Bashir  [facility] [email or web address]

## 2019-02-23 NOTE — PROGRESS NOTES
JONATHAN provided patient's daughter with the contact information for [address] [place]) to contact for application. Patient's daughter also asked about Hospice. JONATHAN advised that she would discuss with MD. MD awaiting oncology and palliative care consults to be completed, but will speak with patient and daughter about a referral tomorrow once more information is available. Suzette Sutherland, [email or web address]  [email or web address]

## 2019-02-23 NOTE — PROGRESS NOTES
PM assessment complete. A&O. Granddaughter at bedside. Pt sleeping in bed. Still having small amount of bloody stool. Bed in locked/lowest position. Call light within reach. Denies needs at this time. Will continue to monitor.

## 2019-02-23 NOTE — PROGRESS NOTES
Hospitalist Progress Note Admit Date:  2019 11:29 AM  
Name:  Sara Matthews Age:  80 y.o. 
:  1932 MRN:  [id number] PCP:  [facility], Not On File Treatment Team: Attending Provider: Mario Wayne MD; Care Manager: Moi Nieves Consulting Provider: TRACIE Coronel; Utilization Review: Woodrow Montenegro RN; Utilization Review: Cherrie Hendrix RN; Consulting Provider: Fani Benavides MD; Consulting Provider: Raymond Odonnell MD; Care Manager: Ny Quintana, REILLY; Consulting Provider: Steffany Nam MD; Consulting Provider: Jaxon Bowers MD 
 
Subjective:  
80yr old female with pmhx sig for chf, htn, asthma. kidney cancer s/p resection, hx of a lung nodule - not known to be cancerous but s/p pneumonectomy. The patient states she has been unwell for months, with diarrhea for months. .. Last admitted to hospital in [address] [place] [address] in December after passing out at the RRsat, she was given blood, scheduled for colonoscopy but poor prep so it was not done. She passed at her home in [address] [place] [address] on Saturday- daughter went to get her and brought her [place]. ... She wanted to go to the bank today. . But needed to use the bathroom on the way. . [facility] and passed out at the sink while washing her hands. .. Ems - called. .. In the er cxr concerning for pneumonia; lactic acid elevated. hospitalist asked to admit\" 
  
  
The patient was admitted. She was started on abx for the pneumonia. She was also transfused 2 units of PRBCS. She was seen by GI with plans for egd and colonoscopy once stable from a cardiology and resp point of view. Cardiology was consulted automatically in the admitting order set and have signed off. She has felt improved with diuresis and the blood. Her major concern continues the to be the diarrhea of unclear etiology. Cdiff is negative. She has no evidence of dig toxicity.  She is allergic to BB with a history of swelling so these were not started. Plan si for egd this week with gi- they have not decided on imaging for her altered anatomy or not at this time. 2/19/19 Pt going for egd  Today Daughter at bedside No complaints 2/20/19 Had egd yesterday GI ordered ct abd pelvis with contrast 
Pt says she is hungry otherwise doing ok 2/21/19 Pt c/o diarrhea from the prep for colonoscopy today  said she would do thoracentesis this am for left pleural effusion 2/22/19 Pt had paracentesis and colonoscopy yesterday C/o mild blood tinge in the pamper Says had a good night 2/23/19 Mild blood tinge  In pamper last night Generalized weakness Objective:  
 
Patient Vitals for the past 24 hrs: 
 Temp Pulse Resp BP SpO2  
02/23/19 1228 96.4 °F (35.8 °C) 96 18 124/70 94 % 02/23/19 0828  74  125/70   
02/23/19 0806 95.5 °F (35.3 °C) 74 16 125/70 98 % 02/23/19 0330 97.2 °F (36.2 °C) 78 18 118/71 98 % 02/22/19 2330 97.8 °F (36.6 °C) 71 18 109/61 100 % 02/22/19 2008 98 °F (36.7 °C) 78  118/72 100 % 02/22/19 1644 97.8 °F (36.6 °C) 79 18 115/73 92 % Oxygen Therapy O2 Sat (%): 94 % (02/23/19 1228) Pulse via Oximetry: 79 beats per minute (02/21/19 1607) O2 Device: Nasal cannula (02/22/19 0715) O2 Flow Rate (L/min): 2 l/min (02/23/19 0825) No intake or output data in the 24 hours ending 02/23/19 1638 General:    Well nourished. Alert.  
heent- normal - blind left eye CV:   RRR. No murmur, rub, or gallop. Lungs:   Improved air entry basal 
Abdomen:   Soft, nontender, nondistended. Obese Cns- no focal neurological deficits Extremities: Warm and dry. No cyanosis . Rt elbow region edema- prob dependent and mild bilateral lower ext- lower 1/3rd of both legs pitting edema. Skin:     No rashes or jaundice. Data Review: 
I have reviewed all labs, meds, telemetry events, and studies from the last 24 hours. Recent Results (from the past 24 hour(s)) MAGNESIUM  
 Collection Time: 02/23/19  6:01 AM  
Result Value Ref Range Magnesium 2.0 1.8 - 2.4 mg/dL CBC WITH AUTOMATED DIFF Collection Time: 02/23/19  6:01 AM  
Result Value Ref Range WBC 12.7 (H) 4.3 - 11.1 K/uL  
 RBC 3.84 (L) 4.05 - 5.2 M/uL  
 HGB 10.3 (L) 11.7 - 15.4 g/dL HCT 34.3 (L) 35.8 - 46.3 % MCV 89.3 79.6 - 97.8 FL  
 MCH 26.8 26.1 - 32.9 PG  
 MCHC 30.0 (L) 31.4 - 35.0 g/dL RDW 20.1 (H) 11.9 - 14.6 % PLATELET 025 283 - 747 K/uL MPV 10.5 9.4 - 12.3 FL ABSOLUTE NRBC 0.00 0.0 - 0.2 K/uL  
 DF AUTOMATED NEUTROPHILS 81 (H) 43 - 78 % LYMPHOCYTES 11 (L) 13 - 44 % MONOCYTES 5 4.0 - 12.0 % EOSINOPHILS 2 0.5 - 7.8 % BASOPHILS 0 0.0 - 2.0 % IMMATURE GRANULOCYTES 1 0.0 - 5.0 %  
 ABS. NEUTROPHILS 10.3 (H) 1.7 - 8.2 K/UL  
 ABS. LYMPHOCYTES 1.3 0.5 - 4.6 K/UL  
 ABS. MONOCYTES 0.6 0.1 - 1.3 K/UL  
 ABS. EOSINOPHILS 0.3 0.0 - 0.8 K/UL  
 ABS. BASOPHILS 0.1 0.0 - 0.2 K/UL  
 ABS. IMM. GRANS. 0.1 0.0 - 0.5 K/UL METABOLIC PANEL, BASIC Collection Time: 02/23/19  6:01 AM  
Result Value Ref Range Sodium 145 136 - 145 mmol/L Potassium 3.2 (L) 3.5 - 5.1 mmol/L Chloride 113 (H) 98 - 107 mmol/L  
 CO2 21 21 - 32 mmol/L Anion gap 11 mmol/L Glucose 83 65 - 100 mg/dL BUN 8 8 - 23 MG/DL Creatinine 1.31 (H) 0.6 - 1.0 MG/DL  
 GFR est AA 50 (L) >60 ml/min/1.73m2 GFR est non-AA 41 ml/min/1.73m2 Calcium 7.4 (L) 8.3 - 10.4 MG/DL All Micro Results Procedure Component Value Units Date/Time CULTURE, BODY FLUID Yamil Dos Santos [[id number]] Collected:  02/21/19 0945 Order Status:  Completed Specimen:  Pleural Fluid Updated:  02/23/19 0987 Special Requests: LEFT     
  GRAM STAIN 0 TO 1 WBC'S/OIF  
   NO DEFINITE ORGANISM SEEN Culture result: NO GROWTH 2 DAYS     
 CULTURE, BLOOD [[id number]] Collected:  02/13/19 1644 Order Status:  Completed Specimen:  Blood Updated:  02/18/19 1532 Special Requests: --     
  RIGHT JUGULAR VEIN 
  
 Culture result: NO GROWTH 5 DAYS     
 CULTURE, BLOOD [[id number]] Collected:  02/13/19 1647 Order Status:  Completed Specimen:  Blood Updated:  02/18/19 1532 Special Requests: --     
  RIGHT 
ARM Culture result: NO GROWTH 5 DAYS     
 CULTURE, BLOOD [[id number]] Collected:  02/13/19 2103 Order Status:  Completed Specimen:  Blood Updated:  02/18/19 1532 Special Requests: --     
  RIGHT JUGULAR VEIN Culture result: NO GROWTH 5 DAYS     
 CULTURE, STOOL [[id number]] Collected:  02/14/19 1901 Order Status:  Completed Specimen:  Stool Updated:  02/17/19 9883 Special Requests: NO SPECIAL REQUESTS Culture result:    
  No Salmonella, Shigella, or Ecoli 0157 isolated. CULTURE, URINE [[id number]] Collected:  02/13/19 1556 Order Status:  Completed Specimen:  Urine Updated:  02/16/19 9420 Special Requests: NO SPECIAL REQUESTS Culture result: NO GROWTH 2 DAYS C. DIFFICILE AG & TOXIN A/B [[id number]] Collected:  02/14/19 1900 Order Status:  Completed Specimen:  Stool Updated:  02/15/19 1424 [id number] [place] ANTIGEN    
  C. DIFFICILE GDH ANTIGEN-NEGATIVE  
     
  C. difficile toxin C. DIFFICILE TOXIN-NEGATIVE  
     
  PCR Reflex NOT APPLICABLE INTERPRETATION    
  NEGATIVE FOR TOXIGENIC C. DIFFICILE Clinical Consideration NEGATIVE FOR TOXIGENIC C. DIFFICILE  
     
 C. DIFFICILE AG & TOXIN A/B [[id number]] Collected:  02/14/19 1901 Order Status:  Canceled Specimen:  Stool Updated:  02/14/19 1929 Current Meds: 
Current Facility-Administered Medications Medication Dose Route Frequency  potassium chloride (K-DUR, KLOR-CON) SR tablet 40 mEq  40 mEq Oral Q4H  
 [START ON 2/24/2019] furosemide (LASIX) tablet 20 mg  20 mg Oral DAILY  aspirin chewable tablet 81 mg  81 mg Oral DAILY  digoxin (LANOXIN) tablet 0.125 mg  0.125 mg Oral DAILY  dilTIAZem CD (CARDIZEM CD) capsule 120 mg  120 mg Oral DAILY  magnesium oxide (MAG-OX) tablet 400 mg  400 mg Oral BID  pantoprazole (PROTONIX) tablet 40 mg  40 mg Oral ACB&D  piperacillin-tazobactam (ZOSYN) 4.5 g in 0.9% sodium chloride (MBP/ADV) 100 mL  4.5 g IntraVENous Q8H  
 0.9% sodium chloride infusion  50 mL/hr IntraVENous CONTINUOUS  
 acetaminophen (TYLENOL) tablet 650 mg  650 mg Oral Q6H PRN  
 dextrose (D50W) injection syrg 12.5-25 g  25-50 mL IntraVENous PRN  
 dextrose 40% (GLUTOSE) oral gel 1 Tube  15 g Oral PRN  
 glucagon (GLUCAGEN) injection 1 mg  1 mg IntraMUSCular PRN  
 ondansetron (ZOFRAN) injection 4 mg  4 mg IntraVENous Q4H PRN  
 oxyCODONE IR (ROXICODONE) tablet 5 mg  5 mg Oral Q6H PRN  
 sodium chloride (NS) flush 5-10 mL  5-10 mL IntraVENous PRN Other Studies (last 24 hours): No results found. Assessment and Plan:  
 
Hospital Problems as of 2/23/2019 Never Reviewed Codes Class Noted - Resolved POA Liver mass ICD-10-CM: R16.0 ICD-9-CM: 573.9  2/20/2019 - Present Unknown Cecal lesion ICD-10-CM: K63.9 ICD-9-CM: 569.89  2/20/2019 - Present Unknown Pneumonia ICD-10-CM: J18.9 ICD-9-CM: 661  2/13/2019 - Present Unknown [facility] ICD-10-CM: A41.9 ICD-9-CM: 038.9, 995.91  2/13/2019 - Present Unknown * (Principal) Syncope and collapse ICD-10-CM: R55 
ICD-9-CM: 780.2  2/13/2019 - Present Yes Diarrhea ICD-10-CM: R19.7 ICD-9-CM: 787.91  2/13/2019 - Present Yes Edema ICD-10-CM: R60.9 ICD-9-CM: 782.3  2/13/2019 - Present Yes Pleural effusion ICD-10-CM: J90 ICD-9-CM: 511.9  2/13/2019 - Present Yes Atrial fibrillation with rapid ventricular response (HCC) ICD-10-CM: I48.91 
ICD-9-CM: 427.31  4/27/2018 - Present Yes Systolic CHF, acute on chronic (HCC) (Chronic) ICD-10-CM: H49.69 ICD-9-CM: 428.23, 428.0  4/27/2018 - Present Yes Solitary right kidney (Chronic) ICD-10-CM: Q60.0 ICD-9-CM: 753.0  4/27/2018 - Present Yes S/P lobectomy of lung (Chronic) ICD-10-CM: Z90.2 ICD-9-CM: V45.89  4/27/2018 - Present Yes  
   
 CKD (chronic kidney disease) stage 3, GFR 30-59 ml/min (HCC) (Chronic) ICD-10-CM: N18.3 ICD-9-CM: 585.3  4/27/2018 - Present Yes PLAN:   
Syncope - sec to symptomatic anemia 
pna - cont zosyn Hypokalemia- replace. Effusion- multifactorial chf and PEM(decrease albumin)- s/o thoracentesis- 650 ml taken left lung 2/21/19 Ascites- paracentesis done on 2/22/19 Liver and cecal mass-had colonoscopy 2/21/19- biopsies taken 
afib with rvr- on ccb and digoxin-- h/o allergic reaction to beta blockers Heme occult positive- had egd during the stay 
ckd 3- cont ivf. Full code Palliative care consult placed. Spoke to pt, family at bedside. DC planning/Dispo: DVT ppx:  scd Signed: 
Roseann Irvin MD

## 2019-02-23 NOTE — PROGRESS NOTES
[employer] [facility] Inpatient Hematology / Oncology Progress NoteReason for Consult:  Pneumonia [J18.9] Sepsis ([facility] [address] [A41.9] Referring Physician:  Justin Wheeler MD 
History of Present Illness: Ms. Deangelo Portillo is a 80 y.o. female admitted on 2/13/2019. Her PMH includes CHF, HTN, asthma, and kidney cancer s/p resection, hx of lung nodule - not known to be cancerous, but s/p pneumonectomy. Pt presented with c/o being unwell and with diarrhea for months. She was last admitted to a hospital in [facility] in Dec 2018 after passing out at [employer], she was given blood and scheduled for a colonoscopy, but had poor prep so it was not performed. She passed out again at her home in [facility] on 2/9, so daughter brought her to [place]. Along the way, she passed out at a sink while washing her hands after a BM. CXR with L basilar infiltrate and small pleural effusion. CT AP with liver dome mass; also with large volume ascites with anasarca, distended gallbladder, and colonic diverticulosis. Cdiff neg. EGD performed on 2/19 with no source for anemia or weight loss found. Colonoscopy performed today with large mass noted in the transverse colon, multiple biopsies taken. CEA elevated at 97. L thoracentesis performed today, 650mL removed and sent for studies. We were consulted for suspected colon cancer with liver mets. Interim update: Hb relatively stable, had paracentesis but no liver biopsy yet. Review of Systems: 
Constitutional +appetite changes +wt loss +fatigue. Denies fever, chills, night sweats. HEENT Denies trauma, blurry vision, hearing loss, ear pain, nosebleeds, sore throat, neck pain and ear discharge. Skin Denies lesions or rashes. Lungs Denies dyspnea, cough, sputum production or hemoptysis. Cardiovascular Denies chest pain, palpitations, or lower extremity edema. Gastrointestinal +diarrhea.  Denies nausea, vomiting, bloody or black stools, abdominal pain.  Denies dysuria, frequency or hesitancy of urination. Neuro +syncope. Denies headaches, visual changes or ataxia. Denies dizziness, tingling, tremors, sensory change, speech change, focal weakness or headaches. Hematology Denies easy bruising or bleeding, denies gingival bleeding or epistaxis. Endo Denies heat/cold intolerance, denies diabetes or thyroid abnormalities. MSK Denies back pain, arthralgias, myalgias or frequent falls. Psychiatric/Behavioral +hx depression. Denies depression and substance abuse. The patient is not nervous/anxious. Allergies Allergen Reactions  Atenolol Swelling Past Medical History:  
Diagnosis Date  Arthritis  Asthma  CAD (coronary artery disease)  Cancer ([facility] [address]  Chronic kidney disease   
 kidney removed  Heart failure ([facility] [address]  Hypertension  Psychiatric disorder   
 depresion  PUD (peptic ulcer disease)   
 acid reflux Past Surgical History:  
Procedure Laterality Date  HX HEENT    
 eye surgery  HX OTHER SURGICAL    
 lung  HX UROLOGICAL History reviewed. No pertinent family history. Social History Socioeconomic History  Marital status: SINGLE Spouse name: Not on file  Number of children: Not on file  Years of education: Not on file  Highest education level: Not on file Social Needs  Financial resource strain: Not on file  Food insecurity - worry: Not on file  Food insecurity - inability: Not on file  Transportation needs - medical: Not on file  Transportation needs - non-medical: Not on file Occupational History  Not on file Tobacco Use  Smoking status: Former Smoker  Smokeless tobacco: Never Used Substance and Sexual Activity  Alcohol use: No  
 Drug use: Not on file  Sexual activity: Not on file Other Topics Concern  Not on file Social History Narrative  Not on file Current Facility-Administered Medications Medication Dose Route Frequency Provider Last Rate Last Dose  potassium chloride (K-DUR, KLOR-CON) SR tablet 40 mEq  40 mEq Oral Q4H Todd Koroma MD   40 mEq at 02/23/19 1118  [START ON 2/24/2019] furosemide (LASIX) tablet 20 mg  20 mg Oral DAILY Todd Koroma MD      
 aspirin chewable tablet 81 mg  81 mg Oral DAILY Todd Koroma MD   81 mg at 02/23/19 0557  digoxin (LANOXIN) tablet 0.125 mg  0.125 mg Oral DAILY Todd Koroma MD   0.125 mg at 02/23/19 8837  dilTIAZem CD (CARDIZEM CD) capsule 120 mg  120 mg Oral DAILY Todd Koroma MD   120 mg at 02/23/19 9203  magnesium oxide (MAG-OX) tablet 400 mg  400 mg Oral BID Todd Koroma MD   400 mg at 02/23/19 3352  pantoprazole (PROTONIX) tablet 40 mg  40 mg Oral ACB&D Todd Koroma MD   40 mg at 02/23/19 2368  piperacillin-tazobactam (ZOSYN) 4.5 g in 0.9% sodium chloride (MBP/ADV) 100 mL  4.5 g IntraVENous Q8H Todd Koroma MD 25 mL/hr at 02/23/19 0546 4.5 g at 02/23/19 0546  
 0.9% sodium chloride infusion  50 mL/hr IntraVENous CONTINUOUS Todd Koroma MD 50 mL/hr at 02/22/19 1427 50 mL/hr at 02/22/19 1427  acetaminophen (TYLENOL) tablet 650 mg  650 mg Oral Q6H PRN Todd Koroma MD      
 dextrose (D50W) injection syrg 12.5-25 g  25-50 mL IntraVENous PRN Todd Koroma MD      
 dextrose 40% (GLUTOSE) oral gel 1 Tube  15 g Oral PRN Todd Koroma MD      
 glucagon (GLUCAGEN) injection 1 mg  1 mg IntraMUSCular PRN Todd Koroma MD      
 ondansetron (ZOFRAN) injection 4 mg  4 mg IntraVENous Q4H PRN Todd Koroma MD      
 oxyCODONE IR (ROXICODONE) tablet 5 mg  5 mg Oral Q6H PRN Todd Koroma MD   5 mg at 02/23/19 0827  
 sodium chloride (NS) flush 5-10 mL  5-10 mL IntraVENous PRN Todd Koroma MD      
 
 
OBJECTIVE: 
Patient Vitals for the past 8 hrs: 
 BP Temp Pulse Resp SpO2 Weight 19 1228 124/70 96.4 °F (35.8 °C) 96 18 94 %   
19 1015      123 lb (55.8 kg) 19 0828 125/70  74     
19 0806 125/70 95.5 °F (35.3 °C) 74 16 98 %  Temp (24hrs), Av.1 °F (36.2 °C), Min:95.5 °F (35.3 °C), Max:98 °F (36.7 °C) No intake/output data recorded. Physical Exam: 
Constitutional: Well developed, well nourished female in no acute distress, sitting comfortably in the hospital bed. HEENT: Normocephalic and atraumatic. Oropharynx is clear, mucous membranes are moist.  Extraocular muscles are intact. Sclerae anicteric. Neck supple without JVD. No thyromegaly present. Lymph node No palpable submandibular, cervical, supraclavicular, axillary or inguinal lymph nodes. Skin Warm and dry. No bruising and no rash noted. No erythema. No pallor. Respiratory Lungs are clear to auscultation bilaterally without wheezes, rales or rhonchi, normal air exchange without accessory muscle use. CVS Normal rate, regular rhythm and normal S1 and S2. No murmurs, gallops, or rubs. Abdomen Soft, nontender and nondistended, normoactive bowel sounds. No palpable mass. No hepatosplenomegaly. Neuro Grossly nonfocal with no obvious sensory or motor deficits. MSK Normal range of motion in general.  No edema and no tenderness. Psych Appropriate mood and affect. Labs:   
Recent Results (from the past 24 hour(s)) METABOLIC PANEL, BASIC Collection Time: 19  2:59 PM  
Result Value Ref Range Sodium 144 136 - 145 mmol/L Potassium 3.4 (L) 3.5 - 5.1 mmol/L Chloride 111 (H) 98 - 107 mmol/L  
 CO2 23 21 - 32 mmol/L Anion gap 10 mmol/L Glucose 77 65 - 100 mg/dL BUN 8 8 - 23 MG/DL Creatinine 1.34 (H) 0.6 - 1.0 MG/DL  
 GFR est AA 48 (L) >60 ml/min/1.73m2 GFR est non-AA 40 ml/min/1.73m2 Calcium 8.0 (L) 8.3 - 10.4 MG/DL MAGNESIUM Collection Time: 19  6:01 AM  
Result Value Ref Range Magnesium 2.0 1.8 - 2.4 mg/dL CBC WITH AUTOMATED DIFF Collection Time: 02/23/19  6:01 AM  
Result Value Ref Range WBC 12.7 (H) 4.3 - 11.1 K/uL  
 RBC 3.84 (L) 4.05 - 5.2 M/uL  
 HGB 10.3 (L) 11.7 - 15.4 g/dL HCT 34.3 (L) 35.8 - 46.3 % MCV 89.3 79.6 - 97.8 FL  
 MCH 26.8 26.1 - 32.9 PG  
 MCHC 30.0 (L) 31.4 - 35.0 g/dL RDW 20.1 (H) 11.9 - 14.6 % PLATELET 881 096 - 322 K/uL MPV 10.5 9.4 - 12.3 FL ABSOLUTE NRBC 0.00 0.0 - 0.2 K/uL  
 DF AUTOMATED NEUTROPHILS 81 (H) 43 - 78 % LYMPHOCYTES 11 (L) 13 - 44 % MONOCYTES 5 4.0 - 12.0 % EOSINOPHILS 2 0.5 - 7.8 % BASOPHILS 0 0.0 - 2.0 % IMMATURE GRANULOCYTES 1 0.0 - 5.0 %  
 ABS. NEUTROPHILS 10.3 (H) 1.7 - 8.2 K/UL  
 ABS. LYMPHOCYTES 1.3 0.5 - 4.6 K/UL  
 ABS. MONOCYTES 0.6 0.1 - 1.3 K/UL  
 ABS. EOSINOPHILS 0.3 0.0 - 0.8 K/UL  
 ABS. BASOPHILS 0.1 0.0 - 0.2 K/UL  
 ABS. IMM. GRANS. 0.1 0.0 - 0.5 K/UL METABOLIC PANEL, BASIC Collection Time: 02/23/19  6:01 AM  
Result Value Ref Range Sodium 145 136 - 145 mmol/L Potassium 3.2 (L) 3.5 - 5.1 mmol/L Chloride 113 (H) 98 - 107 mmol/L  
 CO2 21 21 - 32 mmol/L Anion gap 11 mmol/L Glucose 83 65 - 100 mg/dL BUN 8 8 - 23 MG/DL Creatinine 1.31 (H) 0.6 - 1.0 MG/DL  
 GFR est AA 50 (L) >60 ml/min/1.73m2 GFR est non-AA 41 ml/min/1.73m2 Calcium 7.4 (L) 8.3 - 10.4 MG/DL Imaging: 
CT ABD PELV W CONT [[id number]] Collected: 02/20/19 1015 Order Status: Completed Updated: 02/20/19 1349 Addenda: Addendum: Addendum: On second review, there appears to be a colocolonic intussusception involving the proximal transverse large bowel. The lead   point might be a cecal mass. The referring physician was aware of these findings   prior to this addendum. In light of this, the liver dome lesion may represent metastatic disease. Signed: 02/20/19 1347 by Izzy Lucia MD  
Narrative:    
CT of the Abdomen and Pelvis with contrast  
 
INDICATION:  Weight loss. Gastroesophageal reflux. Elevated CEA COMPARISON: None TECHNIQUE: Multiple axial images were obtained through the abdomen and pelvis 
after intravenous injection of 100 mL Isovue 370. Oral contrast was 
administered. Coronal reformatted images were provided. Contrast necessary to 
increase sensitivity for neoplasia and infection. Radiation dose reduction 
techniques were used for this study:  Our CT scanners use one or all of the 
following: Automated exposure control, adjustment of the mA and/or kVp according 
to patient's size, iterative reconstruction. FINDINGS: 
Abdomen CT:  Mediastinal shift to the right there is a moderate-sized simple 
left pleural effusion. The left lung shows dependent atelectasis. The remainder 
of the included left lung is hyperexpanded. There is a peripherally enhancing at 
least moderate right pleural fluid collection. The patient is status post right 
pneumonectomy. Coronary artery calcifications. There is diffuse anasarca. No 
discrete splenic abnormality. There are few hypoattenuating scattered liver 
cysts. Peripherally dense 2.2 cm liver dome mass with adjacent hypervascular 
wedge-shaped low artifact. Large amount of abdominal ascites. Gallbladder 
distention without gallstones. Right adrenal gland is normal. Couple 
hypoattenuating right upper pole small renal cysts. Left kidney and left adrenal 
gland are not seen. Pancreas is normal. Abdominal aorta is atherosclerotic with 
atherosclerotic major branch vessels. Diffuse anasarca. CT PELVIS: Urinary bladder is normal. Colonic diverticulosis without 
diverticulitis. Large volume pelvic free fluid. Subcutaneous air compatible with 
medication injection site is left of midline. The bones show no acute or 
aggressive lesions. Impression:    
IMPRESSION: Peripherally dense liver dome 2.2 cm mass with adjacent 
hypervascularity.  The peripheral density may represent calcification or contrast 
 enhancement. Noncontrast imaging would be beneficial. HCC is possible. 2. Large volume ascites with anasarca. Large left pleural effusion. Right 
pleural fluid related to right pneumonectomy. 3. Distended gallbladder. 4. Absent left kidney. 5. Colonic diverticulosis. XR UPPER GI SERIES W KUB [[id number]] Collected: 02/18/19 1710 Order Status: Completed Updated: 02/18/19 1715 Narrative:    
Indication:80year-old female with history of previous lung surgery 20 years 
ago. Patient has known esophageal and tracheal deviation to the right. Patient 
recently been experiencing anemia, nausea, and weight loss Comparison exams: None Findings:  view the abdomen demonstrates moderate to borderline large 
amount of gaseous distention of both large and small bowel loops. This is 
suggestive of a generalized ileus. Under direct fluoroscopic visualization, patient took one sip of barium. However, there is no visible transit past the proximal to middle esophagus. After waiting 10 minutes, and no demonstratable movement of barium past this 
point, the study was terminated. Total fluoroscopy time = 1 minute Impression:    
Impression:   
No visible esophageal motility.  
 Study was terminated at this point. Findings were discussed the patient time examination. XR CHEST PA LAT [[id number]] Collected: 02/13/19 1116 Order Status: Completed Updated: 02/13/19 1120 Narrative:    
Chest 2 view CLINICAL INDICATION: Acute syncope and weakness, vomiting, one week of upper 
abdominal pain and diarrhea. History of coronary artery disease, hypertension, 
heart failure, arthritis, asthma, lung nodules and scarring, chronic kidney 
disease, former smoker, right lung lobectomy COMPARISON: 4/27/2018 radiograph TECHNIQUE: Upright AP and lateral views of the chest  
 
FINDINGS: Again demonstrated is complete opacification of the right hemithorax with perihilar surgical clips, and deviation of the trachea and mediastinum to 
the right. This is suggestive of previous right pneumonectomy and not definitely 
changed since prior. Mediastinal and hilar contours are mostly obscured. There 
is increased mild groundglass opacity in the left base, as well as a small left 
pleural effusion. Mild chronic linear opacities projecting over right upper lung 
are unchanged and most compatible with scarring. No evidence of a pneumothorax. Diffusely low bone density. Impression:    
IMPRESSION:  
1. Left basilar infiltrate and small left pleural effusion. 2. Chronic right pneumonectomy change again noted. ASSESSMENT: 
Problem List  Never Reviewed Codes Class Noted Liver mass ICD-10-CM: R16.0 ICD-9-CM: 573.9  2/20/2019 Cecal lesion ICD-10-CM: K63.9 ICD-9-CM: 569.89  2/20/2019 Pneumonia ICD-10-CM: J18.9 ICD-9-CM: 795  2/13/2019 Sepsis ([facility] [address] ICD-10-CM: A41.9 ICD-9-CM: 038.9, 995.91  2/13/2019 * (Principal) Syncope and collapse ICD-10-CM: R55 
ICD-9-CM: 780.2  2/13/2019 Diarrhea ICD-10-CM: R19.7 ICD-9-CM: 787.91  2/13/2019 Edema ICD-10-CM: R60.9 ICD-9-CM: 782.3  2/13/2019 Pleural effusion ICD-10-CM: J90 ICD-9-CM: 511.9  2/13/2019 Atrial fibrillation with rapid ventricular response (HCC) ICD-10-CM: I48.91 
ICD-9-CM: 427.31  4/27/2018 Systolic CHF, acute on chronic (HCC) (Chronic) ICD-10-CM: W03.75 ICD-9-CM: 428.23, 428.0  4/27/2018 Solitary right kidney (Chronic) ICD-10-CM: Q60.0 ICD-9-CM: 753.0  4/27/2018 S/P lobectomy of lung (Chronic) ICD-10-CM: Z90.2 ICD-9-CM: V45.89  4/27/2018 CKD (chronic kidney disease) stage 3, GFR 30-59 ml/min (HCC) (Chronic) ICD-10-CM: N18.3 ICD-9-CM: 585.3  4/27/2018 Chronic anemia (Chronic) ICD-10-CM: D64.9 ICD-9-CM: 285.9  4/27/2018 Hypomagnesemia ICD-10-CM: S24.79 
ICD-9-CM: 275.2  4/27/2018 Hypokalemia ICD-10-CM: E87.6 ICD-9-CM: 276.8  4/27/2018 RECOMMENDATIONS: 
80 y.o. F consulted for colon cancer. She lived in [facility] independently till recently developed multiple episodes of syncope, relocated to [place] to join daughter's family and found sever anemia, responded to pRBC properly, GI found a large transverse colon mass, CT also showed hypervascular liver mass. I discussed with pt and family the liver mass certainly is most suspicious of metastatic colon cancer, but also possible for metastatic RCC considering the hypervascular feature given her left RCC s/p nephrectomy in 2016, vs [facility] [address] as radiology commented. I discussed with primary team and agreed to arrange US guided liver biopsy to ultimately clarify, also follow the cytology from pleural effusion. Once liver biopsy is done, she may consider having a PET and follow all results in office. Pt and family understood and agreeable with the plan. Her Hb had been relatively stable, liver biopsy had not been done due to ascites, which had been removed. I discussed with pt and family again and still desire workup and rx, need have liver mass biopsy, also arrange outpt PET and see me in office for results. Please call for questions. Lab studies and imaging studies were personally reviewed. Visit was 35 min and over 50% counseling. Thank you for allowing us to participate in the care of Ms. Bonifacio Gamboa. [facility] [address] [address] Office : [phone] Fax : [phone]

## 2019-02-23 NOTE — PROGRESS NOTES
Assessment done via doc flow sheet. Pt resting in bed, family member at bedside. Alert & oriented x4, resp easy & regular, lung sounds CTA bilaterally. Complained of abd pain 8/10. Roxiciodone 5 mg 1 tab po given as ordered. Call light within reach, pt instructed to call for assistance as needed.

## 2019-02-24 NOTE — PROGRESS NOTES
PT NOTE: Patient is due for PT reassessment. She has had a lot of procedures and has not felt well enough to participate with PT for the last several days. Spoke with Danielle Mike in Case Management. Hospice has been consulted and they will see patient in the morning. Will assess appropriateness for PT after hospice consult-will most likely discharge.

## 2019-02-24 NOTE — PROGRESS NOTES
MD requested JONATHAN put in a consult to hospice so family and patient can discuss options. JONATHAN complied and emailed RN liaison for [facility] to advise of consult. ELLIOTT Bryan  [email or web address]

## 2019-02-24 NOTE — PROGRESS NOTES
Family at bedside. Respirations present, non-labored. PIV infusing w/o difficulty. Given Zofran for C/o of nausea. No signs of distress noted. Safety measures in place. Will continue to monitor.

## 2019-02-24 NOTE — PROGRESS NOTES
Assessment done via doc flow sheet. Pt restingh in bed, family member at bedside. Alert & oriented x4, resp easy & regular lungs CTA. Denies pain. Call light within reach, pt instructed to call for assistance as needed.

## 2019-02-24 NOTE — PROGRESS NOTES
Hospitalist Progress Note Admit Date:  2019 11:29 AM  
Name:  Otoniel Jacques Age:  80 y.o. 
:  1932 MRN:  [id number] PCP:  [facility], Not On File Treatment Team: Attending Provider: Zainab Pedro MD; Care Manager: Louie Ramirez Consulting Provider: TRACIE Ho; Utilization Review: Antoni Baker, RN; Utilization Review: Pradip Buchanan RN; Consulting Provider: Baron Espinal MD; Consulting Provider: Elaine Francis MD; Care Manager: Ira Donald RN; Consulting Provider: Raghu Hand MD 
 
Subjective:  
80yr old female with pmhx sig for chf, htn, asthma. kidney cancer s/p resection, hx of a lung nodule - not known to be cancerous but s/p pneumonectomy. The patient states she has been unwell for months, with diarrhea for months. .. Last admitted to hospital in [address] [place] [address] in December after passing out at the [employer], she was given blood, scheduled for colonoscopy but poor prep so it was not done. She passed at her home in [address] [place] [address] on Saturday- daughter went to get her and brought her [place]. ... She wanted to go to the bank today. . But needed to use the bathroom on the way. . They stopped at [facility]-- the pt had a bm and passed out at the sink while washing her hands. .. Ems - called. .. In the er cxr concerning for pneumonia; lactic acid elevated. hospitalist asked to admit\" 
  
  
The patient was admitted. She was started on abx for the pneumonia. She was also transfused 2 units of PRBCS. She was seen by GI with plans for egd and colonoscopy once stable from a cardiology and resp point of view. Cardiology was consulted automatically in the admitting order set and have signed off. She has felt improved with diuresis and the blood. Her major concern continues the to be the diarrhea of unclear etiology. Cdiff is negative. She has no evidence of dig toxicity. She is allergic to BB with a history of swelling so these were not started.  Plan si for egd this week with gi- they have not decided on imaging for her altered anatomy or not at this time. 2/19/19 Pt going for egd  Today Daughter at bedside No complaints 2/20/19 Had egd yesterday GI ordered ct abd pelvis with contrast 
Pt says she is hungry otherwise doing ok 2/21/19 Pt c/o diarrhea from the prep for colonoscopy today  said she would do thoracentesis this am for left pleural effusion 2/22/19 Pt had paracentesis and colonoscopy yesterday C/o mild blood tinge in the pamper Says had a good night 2/23/19 Mild blood tinge  In pamper last night Generalized weakness 2/24/19 Staff says 2 episode of blood tinged bowel last night Pt says doing ok Daughter at bedside Objective:  
 
Patient Vitals for the past 24 hrs: 
 Temp Pulse Resp BP SpO2  
02/24/19 1136 95.8 °F (35.4 °C) 73 16 96/57 98 % 02/24/19 0800 97.1 °F (36.2 °C) 78 16 112/61 97 % 02/24/19 0345 97.7 °F (36.5 °C) (!) 107 20 111/64 91 % 02/23/19 2335 97.8 °F (36.6 °C) (!) 107 20 105/63 97 % 02/23/19 1922 97.8 °F (36.6 °C) 76 16 115/64 99 % Oxygen Therapy O2 Sat (%): 98 % (02/24/19 1136) Pulse via Oximetry: 79 beats per minute (02/21/19 1607) O2 Device: Room air (02/24/19 0825) O2 Flow Rate (L/min): 2 l/min (02/23/19 0825) Intake/Output Summary (Last 24 hours) at 2/24/2019 1550 Last data filed at 2/24/2019 3826 Gross per 24 hour Intake 292 ml Output  Net 292 ml General:    Well nourished. Alert.  
heent- normal - blind left eye CV:   RRR. No murmur, rub, or gallop. Lungs:   Improved air entry basal 
Abdomen:   Soft, nontender, nondistended. Obese Cns- no focal neurological deficits Extremities: Warm and dry. No cyanosis . Rt elbow region edema- prob dependent and mild bilateral lower ext- lower 1/3rd of both legs pitting edema. Skin:     No rashes or jaundice. Data Review: 
I have reviewed all labs, meds, telemetry events, and studies from the last 24 hours. Recent Results (from the past 24 hour(s)) MAGNESIUM Collection Time: 02/24/19  5:43 AM  
Result Value Ref Range Magnesium 1.8 1.8 - 2.4 mg/dL CBC WITH AUTOMATED DIFF Collection Time: 02/24/19  5:43 AM  
Result Value Ref Range WBC 13.6 (H) 4.3 - 11.1 K/uL  
 RBC 3.37 (L) 4.05 - 5.2 M/uL HGB 9.0 (L) 11.7 - 15.4 g/dL HCT 29.1 (L) 35.8 - 46.3 % MCV 86.4 79.6 - 97.8 FL  
 MCH 26.7 26.1 - 32.9 PG  
 MCHC 30.9 (L) 31.4 - 35.0 g/dL  
 RDW 19.9 (H) 11.9 - 14.6 % PLATELET 689 561 - 344 K/uL MPV 10.2 9.4 - 12.3 FL ABSOLUTE NRBC 0.00 0.0 - 0.2 K/uL  
 DF AUTOMATED NEUTROPHILS 82 (H) 43 - 78 % LYMPHOCYTES 10 (L) 13 - 44 % MONOCYTES 5 4.0 - 12.0 % EOSINOPHILS 2 0.5 - 7.8 % BASOPHILS 0 0.0 - 2.0 % IMMATURE GRANULOCYTES 1 0.0 - 5.0 %  
 ABS. NEUTROPHILS 11.1 (H) 1.7 - 8.2 K/UL  
 ABS. LYMPHOCYTES 1.3 0.5 - 4.6 K/UL  
 ABS. MONOCYTES 0.6 0.1 - 1.3 K/UL  
 ABS. EOSINOPHILS 0.3 0.0 - 0.8 K/UL  
 ABS. BASOPHILS 0.1 0.0 - 0.2 K/UL  
 ABS. IMM. GRANS. 0.2 0.0 - 0.5 K/UL METABOLIC PANEL, BASIC Collection Time: 02/24/19  5:43 AM  
Result Value Ref Range Sodium 143 136 - 145 mmol/L Potassium 3.6 3.5 - 5.1 mmol/L Chloride 112 (H) 98 - 107 mmol/L  
 CO2 23 21 - 32 mmol/L Anion gap 8 mmol/L Glucose 82 65 - 100 mg/dL BUN 7 (L) 8 - 23 MG/DL Creatinine 1.24 (H) 0.6 - 1.0 MG/DL  
 GFR est AA 53 (L) >60 ml/min/1.73m2 GFR est non-AA 44 ml/min/1.73m2 Calcium 7.1 (L) 8.3 - 10.4 MG/DL All Micro Results Procedure Component Value Units Date/Time CULTURE, BODY FLUID Yair Henao [[id number]] Collected:  02/21/19 0981 Order Status:  Completed Specimen:  Pleural Fluid Updated:  02/23/19 2851 Special Requests: LEFT     
  GRAM STAIN 0 TO 1 WBC'S/OIF  
   NO DEFINITE ORGANISM SEEN Culture result: NO GROWTH 2 DAYS     
 CULTURE, BLOOD [[id number]] Collected:  02/13/19 1644 Order Status:  Completed Specimen:  Blood Updated:  02/18/19 5942 Special Requests: --     
  RIGHT JUGULAR VEIN Culture result: NO GROWTH 5 DAYS     
 CULTURE, BLOOD [[id number]] Collected:  02/13/19 1647 Order Status:  Completed Specimen:  Blood Updated:  02/18/19 1532 Special Requests: --     
  RIGHT 
ARM Culture result: NO GROWTH 5 DAYS     
 CULTURE, BLOOD [[id number]] Collected:  02/13/19 2103 Order Status:  Completed Specimen:  Blood Updated:  02/18/19 1532 Special Requests: --     
  RIGHT JUGULAR VEIN Culture result: NO GROWTH 5 DAYS     
 CULTURE, STOOL [[id number]] Collected:  02/14/19 1901 Order Status:  Completed Specimen:  Stool Updated:  02/17/19 8279 Special Requests: NO SPECIAL REQUESTS Culture result:    
  No Salmonella, Shigella, or Ecoli 0157 isolated. CULTURE, URINE [[id number]] Collected:  02/13/19 1556 Order Status:  Completed Specimen:  Urine Updated:  02/16/19 8198 Special Requests: NO SPECIAL REQUESTS Culture result: NO GROWTH 2 DAYS C. DIFFICILE AG & TOXIN A/B [[id number]] Collected:  02/14/19 1900 Order Status:  Completed Specimen:  Stool Updated:  02/15/19 1424 [address] [place] ANTIGEN    
  C. DIFFICILE GDH ANTIGEN-NEGATIVE  
     
  C. difficile toxin C. DIFFICILE TOXIN-NEGATIVE  
     
  PCR Reflex NOT APPLICABLE INTERPRETATION    
  NEGATIVE FOR TOXIGENIC C. DIFFICILE Clinical Consideration NEGATIVE FOR TOXIGENIC C. DIFFICILE  
     
 C. DIFFICILE AG & TOXIN A/B [[id number]] Collected:  02/14/19 1901 Order Status:  Canceled Specimen:  Stool Updated:  02/14/19 1929 Current Meds: 
Current Facility-Administered Medications Medication Dose Route Frequency  furosemide (LASIX) tablet 20 mg  20 mg Oral DAILY  aspirin chewable tablet 81 mg  81 mg Oral DAILY  digoxin (LANOXIN) tablet 0.125 mg  0.125 mg Oral DAILY  dilTIAZem CD (CARDIZEM CD) capsule 120 mg  120 mg Oral DAILY  magnesium oxide (MAG-OX) tablet 400 mg  400 mg Oral BID  
  pantoprazole (PROTONIX) tablet 40 mg  40 mg Oral ACB&D  piperacillin-tazobactam (ZOSYN) 4.5 g in 0.9% sodium chloride (MBP/ADV) 100 mL  4.5 g IntraVENous Q8H  
 0.9% sodium chloride infusion  50 mL/hr IntraVENous CONTINUOUS  
 acetaminophen (TYLENOL) tablet 650 mg  650 mg Oral Q6H PRN  
 dextrose (D50W) injection syrg 12.5-25 g  25-50 mL IntraVENous PRN  
 dextrose 40% (GLUTOSE) oral gel 1 Tube  15 g Oral PRN  
 glucagon (GLUCAGEN) injection 1 mg  1 mg IntraMUSCular PRN  
 ondansetron (ZOFRAN) injection 4 mg  4 mg IntraVENous Q4H PRN  
 oxyCODONE IR (ROXICODONE) tablet 5 mg  5 mg Oral Q6H PRN  
 sodium chloride (NS) flush 5-10 mL  5-10 mL IntraVENous PRN Other Studies (last 24 hours): No results found. Assessment and Plan:  
 
Hospital Problems as of 2/24/2019 Never Reviewed Codes Class Noted - Resolved POA Liver mass ICD-10-CM: R16.0 ICD-9-CM: 573.9  2/20/2019 - Present Unknown Cecal lesion ICD-10-CM: K63.9 ICD-9-CM: 569.89  2/20/2019 - Present Unknown Pneumonia ICD-10-CM: J18.9 ICD-9-CM: 500  2/13/2019 - Present Unknown Sepsis ([facility] ICD-10-CM: A41.9 ICD-9-CM: 038.9, 995.91  2/13/2019 - Present Unknown * (Principal) Syncope and collapse ICD-10-CM: R55 
ICD-9-CM: 780.2  2/13/2019 - Present Yes Diarrhea ICD-10-CM: R19.7 ICD-9-CM: 787.91  2/13/2019 - Present Yes Edema ICD-10-CM: R60.9 ICD-9-CM: 782.3  2/13/2019 - Present Yes Pleural effusion ICD-10-CM: J90 ICD-9-CM: 511.9  2/13/2019 - Present Yes Atrial fibrillation with rapid ventricular response (HCC) ICD-10-CM: I48.91 
ICD-9-CM: 427.31  4/27/2018 - Present Yes Systolic CHF, acute on chronic (HCC) (Chronic) ICD-10-CM: L00.98 ICD-9-CM: 428.23, 428.0  4/27/2018 - Present Yes Solitary right kidney (Chronic) ICD-10-CM: Q60.0 ICD-9-CM: 753.0  4/27/2018 - Present Yes S/P lobectomy of lung (Chronic) ICD-10-CM: Z90.2 ICD-9-CM: V45.89  4/27/2018 - Present Yes  
   
 CKD (chronic kidney disease) stage 3, GFR 30-59 ml/min (HCC) (Chronic) ICD-10-CM: N18.3 ICD-9-CM: 585.3  4/27/2018 - Present Yes PLAN:   
Syncope - sec to symptomatic anemia Gi bleed- colon mass Rt elbow swelling- minimal tender- pitting- will order venous doppler 
pna - cont zosyn Hypokalemia- resolved. Effusion- multifactorial chf and PEM(decrease albumin)- s/o thoracentesis- 650 ml taken left lung 2/21/19 Ascites- paracentesis done on 2/22/19 Liver and cecal mass-had colonoscopy 2/21/19- biopsies taken 
afib with rvr- on ccb and digoxin-- h/o allergic reaction to beta blockers Heme occult positive- had egd during the stay 
ckd 3- cont ivf. Full code Hospice care consult placed after speaking to daughter. Spoke to pt, family at bedside. DC planning/Dispo: DVT ppx:  scd Signed: 
Kandace Colunga MD

## 2019-02-25 PROBLEM — C80.1 ADENOCARCINOMA (HCC): Status: ACTIVE | Noted: 2019-01-01

## 2019-02-25 NOTE — HOSPICE
Met with with daughter, Richar Santos, at the patient's bedside after talking with Imani the CM. Presented to the daughter and the patient how [facility]) could help them if they decide to return to the daughter's home with assistance from [facility]. Daughter and the patient desire for the patient to return to the daughter's home with assistance from [facility]. Reviewed the patient's EMR with Dr. Davy Frederick, [facility] physician. Dr. Davy Frederick agrees that the patient meets hospice criteria for [facility]. All consents signed with the patient and her daughter. Dr. Davy Frederick asked that when the Hospitalist discharges the patient on Tuesday morning, 2/26/18, that they send the patient home with a prescription for Lorazepam and Roxanol at a dosage and frequency determined by the Hospitalist. Informed the  and the patient's nurse of Dr. Ally Albert request. DNR obtained and copy made and given to the CM for the hospitalist to sign for transportation. Requested the CM to request the patient be discharged to arrive home between 10 - 11 am tomorrow. Ordered DME from [facility] [place] to be delivered to the daughter's home. DME includes Hospital bed, over the bed table, O2 concentrator with backup tank (2L/min NC continuous) and transport wheel chair. The plan is for the patient to be admitted to [facility] with [facility] tomorrow morning 2/26/19. Thank you for this referral, 
 
Kit Stanford RN, BSN Community Nurse Liaison [facility] [phone]

## 2019-02-25 NOTE — PROGRESS NOTES
Hospitalist Progress Note Admit Date:  2019 11:29 AM  
Name:  Monika Arceo Age:  80 y.o. 
:  1932 MRN:  [id number] PCP:  [facility]anza, Not On File Treatment Team: Attending Provider: Mahogany Patton MD; Care Manager: Hunter Wang; Utilization Review: Sushant Casanova RN; Utilization Review: Gurpreet Aguilar RN; Consulting Provider: Jo Ann Kothari MD; Care Manager: Emerson Tran RN Subjective:  
80yr old female with pmhx sig for chf, htn, asthma. kidney cancer s/p resection, hx of a lung nodule - not known to be cancerous but s/p pneumonectomy. The patient states she has been unwell for months, with diarrhea for months. .. Last admitted to hospital in [address] [place] [address] in December after passing out at the [facility], she was given blood, scheduled for colonoscopy but poor prep so it was not done. She passed at her home in [address] [place] [address] on Saturday- daughter went to get her and brought her [place]. ... She wanted to go to the bank today. . But needed to use the bathroom on the way. . They stopped at [facility]-- the pt had a bm and passed out at the sink while washing her hands. .. Ems - called. .. In the er cxr concerning for pneumonia; lactic acid elevated. hospitalist asked to admit\" 
  
  
The patient was admitted. She was started on abx for the pneumonia. She was also transfused 2 units of PRBCS. She was seen by GI with plans for egd and colonoscopy once stable from a cardiology and resp point of view. Cardiology was consulted automatically in the admitting order set and have signed off. She has felt improved with diuresis and the blood. Her major concern continues the to be the diarrhea of unclear etiology. Cdiff is negative. She has no evidence of dig toxicity. She is allergic to BB with a history of swelling so these were not started. Plan si for egd this week with gi- they have not decided on imaging for her altered anatomy or not at this time. Course during the stay Pt had egd on 2/19/19- hiatal hernia, esophageal dilatation done. Small mucosal tear at 15cm consistent with dilatation of benign stricture. Pts CEA was 97. CT abd pelvis was ordered by GI- 
IMPRESSION: Peripherally dense liver dome 2.2 cm mass with adjacent 
hypervascularity. The peripheral density may represent calcification or contrast 
enhancement. Noncontrast imaging would be beneficial. HCC is possible. 2. Large volume ascites with anasarca. Large left pleural effusion. Right 
pleural fluid related to right pneumonectomy. 3. Distended gallbladder. 4. Absent left kidney. 5. Colonic diverticulosis. Addendum: Addendum: On second review, there appears to be a colocolonic 
intussusception involving the proximal transverse large bowel. The lead point 
might be a cecal mass. The referring physician was aware of these findings prior 
to this addendum. In light of this, the liver dome lesion may represent 
metastatic disease. Pt had colonoscopy on 2/21/19 FINDINGS: 
Anus: normal 
Rectum: normal 
Sigmoid: Severe diverticular disease and resultant stricturing. Very difficult to navigate through. Descending Colon: normal 
Transverse Colon: Several polyps noted. Large ~4-5cm mass noted. Occupying ~2/3 of lumen. Multiple biopsies taken Ascending Colon/Cecum: Large circumferential >5mm mass noted. This occupied the majority of the ascending colon and cecum. The appendiceal orifice was identified. Multiple biopsies taken. Could not identify the ICV due to tumor burden. Terminal ileum: not entered Pt also had thoracentesis- left ling done by  on 2/21/19. -- negative for malignancy. Oncology was consulted. Requested liver biopsy- IR was consulted- couldn't do liver biopsy secondary to ascites- had paracentesis- pathology negative for malignancy. Venous doppler rt upper ext- negative for dvt. pts had been having mild blood tinged bowel since colonoscopy- resolved since yesterday. Palliative care initially and then hospice was consulted. 2/25/19 Pt says had a good night No blood tinged bowel Hospice consulted Objective:  
 
Patient Vitals for the past 24 hrs: 
 Temp Pulse Resp BP SpO2  
02/25/19 1235 97.7 °F (36.5 °C) 94 18 118/67 95 % 02/25/19 0825 97.4 °F (36.3 °C) 82 20 108/55 100 % 02/25/19 0330 97.8 °F (36.6 °C) 68 20 116/65 100 % 02/24/19 2308 97.6 °F (36.4 °C) 71 20 107/61 100 % 02/24/19 1926 97.8 °F (36.6 °C) 71 20 106/59 99 % Oxygen Therapy O2 Sat (%): 95 % (02/25/19 1235) Pulse via Oximetry: 79 beats per minute (02/21/19 1607) O2 Device: Room air (02/25/19 1545) O2 Flow Rate (L/min): 2 l/min (02/23/19 0825) No intake or output data in the 24 hours ending 02/25/19 1640 General:    Well nourished. Alert. On oxygen 
heent- normal - blind left eye CV:   RRR. No murmur, rub, or gallop. Lungs:   Improved air entry basal 
Abdomen:   Soft, nontender, nondistended. Obese Cns- no focal neurological deficits Extremities: Warm and dry. No cyanosis . Rt elbow region edema- prob dependent and mild bilateral lower ext- lower 1/3rd of both legs pitting edema. Skin:     No rashes or jaundice. Data Review: 
I have reviewed all labs, meds, telemetry events, and studies from the last 24 hours. Recent Results (from the past 24 hour(s)) MAGNESIUM Collection Time: 02/25/19  7:37 AM  
Result Value Ref Range Magnesium 1.8 1.8 - 2.4 mg/dL CBC WITH AUTOMATED DIFF Collection Time: 02/25/19  7:37 AM  
Result Value Ref Range WBC 14.0 (H) 4.3 - 11.1 K/uL  
 RBC 3.40 (L) 4.05 - 5.2 M/uL HGB 9.1 (L) 11.7 - 15.4 g/dL HCT 29.7 (L) 35.8 - 46.3 % MCV 87.4 79.6 - 97.8 FL  
 MCH 26.8 26.1 - 32.9 PG  
 MCHC 30.6 (L) 31.4 - 35.0 g/dL RDW 20.0 (H) 11.9 - 14.6 % PLATELET 078 671 - 833 K/uL MPV 10.2 9.4 - 12.3 FL ABSOLUTE NRBC 0.00 0.0 - 0.2 K/uL  
 DF AUTOMATED NEUTROPHILS 81 (H) 43 - 78 % LYMPHOCYTES 11 (L) 13 - 44 % MONOCYTES 4 4.0 - 12.0 % EOSINOPHILS 2 0.5 - 7.8 % BASOPHILS 1 0.0 - 2.0 % IMMATURE GRANULOCYTES 2 0.0 - 5.0 %  
 ABS. NEUTROPHILS 11.4 (H) 1.7 - 8.2 K/UL  
 ABS. LYMPHOCYTES 1.5 0.5 - 4.6 K/UL  
 ABS. MONOCYTES 0.6 0.1 - 1.3 K/UL  
 ABS. EOSINOPHILS 0.3 0.0 - 0.8 K/UL  
 ABS. BASOPHILS 0.1 0.0 - 0.2 K/UL  
 ABS. IMM. GRANS. 0.2 0.0 - 0.5 K/UL METABOLIC PANEL, BASIC Collection Time: 02/25/19  7:37 AM  
Result Value Ref Range Sodium 145 136 - 145 mmol/L Potassium 3.7 3.5 - 5.1 mmol/L Chloride 112 (H) 98 - 107 mmol/L  
 CO2 26 21 - 32 mmol/L Anion gap 7 mmol/L Glucose 67 65 - 100 mg/dL BUN 8 8 - 23 MG/DL Creatinine 1.41 (H) 0.6 - 1.0 MG/DL  
 GFR est AA 45 (L) >60 ml/min/1.73m2 GFR est non-AA 38 ml/min/1.73m2 Calcium 7.5 (L) 8.3 - 10.4 MG/DL  
TYPE & SCREEN Collection Time: 02/25/19  7:37 AM  
Result Value Ref Range Crossmatch Expiration 02/28/2019 ABO/Rh(D) O POSITIVE Antibody screen NEG All Micro Results Procedure Component Value Units Date/Time CULTURE, BODY FLUID Christ Moseley [[id number]] Collected:  02/21/19 0945 Order Status:  Completed Specimen:  Pleural Fluid Updated:  02/23/19 3137 Special Requests: LEFT     
  GRAM STAIN 0 TO 1 WBC'S/OIF  
   NO DEFINITE ORGANISM SEEN Culture result: NO GROWTH 2 DAYS     
 CULTURE, BLOOD [[id number]] Collected:  02/13/19 1644 Order Status:  Completed Specimen:  Blood Updated:  02/18/19 9642 Special Requests: --     
  RIGHT JUGULAR VEIN Culture result: NO GROWTH 5 DAYS     
 CULTURE, BLOOD [[id number]] Collected:  02/13/19 1647 Order Status:  Completed Specimen:  Blood Updated:  02/18/19 1532 Special Requests: --     
  RIGHT 
ARM Culture result: NO GROWTH 5 DAYS     
 CULTURE, BLOOD [[id number]] Collected:  02/13/19 2103 Order Status:  Completed Specimen:  Blood Updated:  02/18/19 1534 Special Requests: --     
  RIGHT JUGULAR VEIN 
  
 Culture result: NO GROWTH 5 DAYS     
 CULTURE, STOOL [[id number]] Collected:  02/14/19 1901 Order Status:  Completed Specimen:  Stool Updated:  02/17/19 0322 Special Requests: NO SPECIAL REQUESTS Culture result:    
  No Salmonella, Shigella, or Ecoli 0157 isolated. CULTURE, URINE [[id number]] Collected:  02/13/19 1556 Order Status:  Completed Specimen:  Urine Updated:  02/16/19 9445 Special Requests: NO SPECIAL REQUESTS Culture result: NO GROWTH 2 DAYS C. DIFFICILE AG & TOXIN A/B [[id number]] Collected:  02/14/19 1900 Order Status:  Completed Specimen:  Stool Updated:  02/15/19 1424 [id number] ANTIGEN    
  C. DIFFICILE GDH ANTIGEN-NEGATIVE  
     
  C. difficile toxin C. DIFFICILE TOXIN-NEGATIVE  
     
  PCR Reflex NOT APPLICABLE INTERPRETATION    
  NEGATIVE FOR TOXIGENIC C. DIFFICILE Clinical Consideration NEGATIVE FOR TOXIGENIC C. DIFFICILE  
     
 C. DIFFICILE AG & TOXIN A/B [[id number]] Collected:  02/14/19 1901 Order Status:  Canceled Specimen:  Stool Updated:  02/14/19 1929 Current Meds: 
Current Facility-Administered Medications Medication Dose Route Frequency  furosemide (LASIX) tablet 20 mg  20 mg Oral DAILY  aspirin chewable tablet 81 mg  81 mg Oral DAILY  digoxin (LANOXIN) tablet 0.125 mg  0.125 mg Oral DAILY  dilTIAZem CD (CARDIZEM CD) capsule 120 mg  120 mg Oral DAILY  magnesium oxide (MAG-OX) tablet 400 mg  400 mg Oral BID  pantoprazole (PROTONIX) tablet 40 mg  40 mg Oral ACB&D  
 0.9% sodium chloride infusion  50 mL/hr IntraVENous CONTINUOUS  
 acetaminophen (TYLENOL) tablet 650 mg  650 mg Oral Q6H PRN  
 dextrose (D50W) injection syrg 12.5-25 g  25-50 mL IntraVENous PRN  
 dextrose 40% (GLUTOSE) oral gel 1 Tube  15 g Oral PRN  
 glucagon (GLUCAGEN) injection 1 mg  1 mg IntraMUSCular PRN  
 ondansetron (ZOFRAN) injection 4 mg  4 mg IntraVENous Q4H PRN  
  oxyCODONE IR (ROXICODONE) tablet 5 mg  5 mg Oral Q6H PRN  
 sodium chloride (NS) flush 5-10 mL  5-10 mL IntraVENous PRN Other Studies (last 24 hours): 
Duplex Upper Ext Venous Right Result Date: 2/24/2019 Right upper extremity venous duplex exam. CLINICAL INDICATION: Right upper extremity swelling for 10 days PROCEDURE: Real-time grayscale, color, and spectral Doppler analysis of the right upper extremity deep venous system from the internal jugular vein through the radial and ulnar veins. COMPARISON: No prior similar study available for direct comparison. FINDINGS: There is normal compressibility where the veins are compressible. No intraluminal echogenic filling defect identified to indicate deep venous thrombosis. Soft tissue edema is present. IMPRESSION: 1. No sonographic evidence for DVT in the right upper extremity. 2. Generalized soft tissue edema Assessment and Plan:  
 
Hospital Problems as of 2/25/2019 Never Reviewed Codes Class Noted - Resolved POA [facility] ICD-10-CM: C80.1 ICD-9-CM: 199.1  2/25/2019 - Present Unknown Liver mass ICD-10-CM: R16.0 ICD-9-CM: 573.9  2/20/2019 - Present Unknown Cecal lesion ICD-10-CM: K63.9 ICD-9-CM: 569.89  2/20/2019 - Present Unknown Pneumonia ICD-10-CM: J18.9 ICD-9-CM: 158  2/13/2019 - Present Unknown Sepsis ([facility] ICD-10-CM: A41.9 ICD-9-CM: 038.9, 995.91  2/13/2019 - Present Unknown * (Principal) Syncope and collapse ICD-10-CM: R55 
ICD-9-CM: 780.2  2/13/2019 - Present Yes Diarrhea ICD-10-CM: R19.7 ICD-9-CM: 787.91  2/13/2019 - Present Yes Edema ICD-10-CM: R60.9 ICD-9-CM: 782.3  2/13/2019 - Present Yes Pleural effusion ICD-10-CM: J90 ICD-9-CM: 511.9  2/13/2019 - Present Yes Atrial fibrillation with rapid ventricular response (HCC) ICD-10-CM: I48.91 
ICD-9-CM: 427.31  4/27/2018 - Present Yes Systolic CHF, acute on chronic (HCC) (Chronic) ICD-10-CM: Z76.12 ICD-9-CM: 428.23, 428.0  4/27/2018 - Present Yes Solitary right kidney (Chronic) ICD-10-CM: Q60.0 ICD-9-CM: 753.0  4/27/2018 - Present Yes S/P lobectomy of lung (Chronic) ICD-10-CM: Z90.2 ICD-9-CM: V45.89  4/27/2018 - Present Yes  
   
 CKD (chronic kidney disease) stage 3, GFR 30-59 ml/min (HCC) (Chronic) ICD-10-CM: N18.3 ICD-9-CM: 585.3  4/27/2018 - Present Yes PLAN:   
Syncope - sec to symptomatic anemia Gi bleed- colon mass- adenocarcinoma Rt elbow swelling- minimal tender- pitting- negative for dvt. 
pna - cont zosyn Hypokalemia- resolved. Effusion- multifactorial chf and PEM(decrease albumin)- s/o thoracentesis- 650 ml taken left lung 2/21/19- negative for malignancy Ascites- paracentesis done on 2/22/19-- negative for malignancy Liver and cecal mass-had colonoscopy 2/21/19- biopsies taken- adenocarcinoma 
afib with rvr- on ccb and digoxin-- h/o allergic reaction to beta blockers Heme occult positive- had egd during the stay- mild stricture, hiatal hernia 
ckd 3- cont ivf. Hospice to see the pt. Hospice has evaluated later today- pt for discharge to home  hospice in am. 
Code status DNR.  
 
Signed: 
Tori Boudreaux MD

## 2019-02-25 NOTE — PROGRESS NOTES
Spoke with Brett Christensen (nurse) stated pt is going into Hospice care tomorrow. PT will D/C pt @ this time.

## 2019-02-25 NOTE — PROGRESS NOTES
Problem: Nutrition Deficit Goal: *Optimize nutritional status Nutrition Follow Up: 
Reason for initial assessment:  
Consult received for General Nutrition Management and Supplements 2/15: Dr. Romeo Jim Referral received from nursing admission Malnutrition Screening Tool for recently lost 14-23# without trying and eating poorly due to chewing problems. Assessment:  
Diet order(s): 2/20-2/13:  Clear Liquids/NPO; 1 meal with full liquids (lunch 2/20) GI soft 2/21 - one meal, NPO 2/22 DIET GI SOFT No options chosen  2/22/19 Pt presented with syncope.  Past medical history notable for AFib, CHF, HTN, CKD-stage 3, kidney cancer s/p resection, pneumonectomy, chronic diarrhea past few months with 6-7 loose stools/day; pt on chronic lasix. S/p colonoscopy large mass transverse colon 2/22 likely liver met on CT making stage IV. Hem/Onc consulted for suspected colon Ca with liver mets. Hospice consult pending. Visit with patient and daughter at bedside. Diet progressed s/p procedures on 2/22. Pt reports good appetite recalls eating ~50% am meal.     
Pertinent Medications:  lasix, MagOx, protonix. Anthropometrics:Height: 5' 5.5\" (166.4 cm),  Weight: 52.4 kg (115 lb 9.6 oz), Weight Source: Bed, Body mass index is 18.94 kg/m². BMI class of underweight for Older American Women. Weight:  2/19:  51.4 kg- weight source not specified Weight: 2/24/19: 55.6 kg (122 lb 9.6 oz) - bed source Macronutrient Needs: EER:  8350-5414 kcal /day (30-35 kcal/kg BW) EPR:  47-52 grams protein/day (0.9-1 grams/kg BW) GFR 43 Intake/Comparative Standards: No recorded meal(s): Pt and family recall ~50% with diet advance. This potentially meets ~65% of kcal and ~90% of protein needs Intervention: 
Meals and snacks: Continue current diet. Medical Nutrition Therapy: Ensure Enlive TID. Coordination of Nutrition Care: Tera Nash RN, Case Management.    
Discharge Nutrition Plan: Continue Oral Nutrition Supplement (ONS) at discharge. Recommend Ensure Enlive or a comparable/similar product Three times daily for 30 days unless otherwise directed by your Primary Care Physician. [place], [address], [place] [address]

## 2019-02-25 NOTE — PROGRESS NOTES
Pulmonary daily Progress NOTE Ignacio Owens 
 
2/25/2019 Ignacio Owens is a 80 y.o. female, a never smoker, with h/o CHF, HTN, asthma, pulmonary nodule (benign per family) s/p R pneumonectomy about 23years ago in [facility], CKD, nephrectomy in 2016 for [address], who was admitted to [facility] on 2/13/19 with frequent diarrhea and weight loss with syncope. She has undergone EGD with esophageal stricture noted and CT abd/pelvis was notable for colocolonic intussusception in prox large intestine concerning for malignancy and she is planned for colonoscopy. Additionally, there is a moderate left pleural effusion on seen on CT abd/pelvis. There are no current respiratory complaints but effusion can be tested for malignant cells. She is 96% on room air. Date of Admission:  2/13/2019 Consultation performed at the request of Dr. Reese José HPI:  
 
S/p tap on 2/21 with 650 ml removed. Note to have transudative effusion with lymphocyte predominance with no cancer cells from the pleural fluid. CF also biopsy was negative for CT. Down to 2 LPM oxygen, and does not use at home. No pain. Right arm edema is less. She is happy with the above results. Nurse reported patient is going to hospice care. Review of Systems: 
-Fever 
-Headaches 
-Chest pain 
-Dyspnea, -wheezing, -cough 
-Abdominal pain, -constipation +Leg swelling -- chronic. Right hand/arm swelling is less. All other organ systems grossly normal. 
 
 
Current Facility-Administered Medications Medication Dose Route Frequency  furosemide (LASIX) tablet 20 mg  20 mg Oral DAILY  aspirin chewable tablet 81 mg  81 mg Oral DAILY  digoxin (LANOXIN) tablet 0.125 mg  0.125 mg Oral DAILY  dilTIAZem CD (CARDIZEM CD) capsule 120 mg  120 mg Oral DAILY  magnesium oxide (MAG-OX) tablet 400 mg  400 mg Oral BID  pantoprazole (PROTONIX) tablet 40 mg  40 mg Oral ACB&D  
  0.9% sodium chloride infusion  50 mL/hr IntraVENous CONTINUOUS  
 acetaminophen (TYLENOL) tablet 650 mg  650 mg Oral Q6H PRN  
 dextrose (D50W) injection syrg 12.5-25 g  25-50 mL IntraVENous PRN  
 dextrose 40% (GLUTOSE) oral gel 1 Tube  15 g Oral PRN  
 glucagon (GLUCAGEN) injection 1 mg  1 mg IntraMUSCular PRN  
 ondansetron (ZOFRAN) injection 4 mg  4 mg IntraVENous Q4H PRN  
 oxyCODONE IR (ROXICODONE) tablet 5 mg  5 mg Oral Q6H PRN  
 sodium chloride (NS) flush 5-10 mL  5-10 mL IntraVENous PRN PHYSICAL EXAM  
 
Vitals:  
 02/24/19 1528 02/24/19 1926 02/24/19 2308 02/25/19 0330 BP: 102/58 106/59 107/61 116/65 Pulse: 70 71 71 68 Resp: 16 20 20 20 Temp: 95.4 °F (35.2 °C) 97.8 °F (36.6 °C) 97.6 °F (36.4 °C) 97.8 °F (36.6 °C) SpO2: 100% 99% 100% 100% Weight:      
Height:      
 
Constitutional:  the patient is well developed and in no acute distress EENMT:  Sclera clear, pupils equal, oral mucosa moist 
Respiratory: mild decreased L base and entire R lung without BS on 2 LPM oxygen. Cardiovascular:  RRR without M,G,R 
Gastrointestinal: soft and non-tender; with positive bowel sounds. Musculoskeletal: warm without cyanosis. There is +2 lower leg edema and +1 right arm. Skin:  no jaundice or rashes, no wounds Neurologic: no gross neuro deficits Psychiatric:  alert and oriented x 3 Diagnostic Studies CXR:   
 
 
CT abd/pelvis: 
 
Recent Labs  
  02/25/19 
0737 02/24/19 
0543 02/23/19 
0601 02/22/19 
7495 WBC 14.0* 13.6* 12.7* 11.4* HGB 9.1* 9.0* 10.3* 10.1* HCT 29.7* 29.1* 34.3* 33.0*  
 241 262 247 Recent Labs  
  02/25/19 
0737 02/24/19 
0543 02/23/19 
0601  143 145  
K 3.7 3.6 3.2*  
* 112* 113* GLU 67 82 83 CO2 26 23 21 BUN 8 7* 8  
CREA 1.41* 1.24* 1.31* MG 1.8 1.8 2.0  
CA 7.5* 7.1* 7.4* No results for input(s): PH, PCO2, PO2, HCO3 in the last 72 hours. No results for input(s): LCAD, LAC in the last 72 hours. Assessment:  (Medical Decision Making) Hospital Problems  Never Reviewed Codes Class Noted POA Liver mass ICD-10-CM: R16.0 ICD-9-CM: 573.9  2/20/2019 Unknown ? Metastasis? Cecal lesion ICD-10-CM: K63.9 ICD-9-CM: 569.89  2/20/2019 Unknown  
 bopsy was negative for CA cells Pneumonia ICD-10-CM: J18.9 ICD-9-CM: 617  2/13/2019 Unknown Do not think she has pneumonia Sepsis ([facility] ICD-10-CM: A41.9 ICD-9-CM: 038.9, 995.91  2/13/2019 Unknown * (Principal) Syncope and collapse ICD-10-CM: R55 
ICD-9-CM: 780.2  2/13/2019 Yes Diarrhea ICD-10-CM: R19.7 ICD-9-CM: 787.91  2/13/2019 Yes Edema ICD-10-CM: R60.9 ICD-9-CM: 782.3  2/13/2019 Yes Ongoing  -- s/p tap of left chest and paracentesis Pleural effusion ICD-10-CM: J90 ICD-9-CM: 511.9  2/13/2019 Yes Thoracentesis today Atrial fibrillation with rapid ventricular response (HCC) ICD-10-CM: I48.91 
ICD-9-CM: 427.31  4/27/2018 Yes Systolic CHF, acute on chronic (HCC) (Chronic) ICD-10-CM: J56.27 ICD-9-CM: 428.23, 428.0  4/27/2018 Yes Solitary right kidney (Chronic) ICD-10-CM: Q60.0 ICD-9-CM: 753.0  4/27/2018 Yes S/P lobectomy of lung (Chronic) ICD-10-CM: Z90.2 ICD-9-CM: V45.89  4/27/2018 Yes Appears to be pneumonectomy for benign disease 23years ago. CKD (chronic kidney disease) stage 3, GFR 30-59 ml/min (HCC) (Chronic) ICD-10-CM: N18.3 ICD-9-CM: 585.3  4/27/2018 Yes Plan/Recommendations:  (Medical Decision Making) --pleural fluids is transudative and cytology was negative. See HPI. Note was lymphocyte predominace. Would f/u remaingin studies from paracentesis --continue oxygen for her and if she is able to do so, then increase oxygen as needed since has pneumonectomy of right lung. 
--going to hospice today and will not follow. I wish her well.  
 
More than 50% of the time documented was spent in face-to-face contact with the patient and in the care of the patient on the floor/unit where the patient is located.  
 
 
Raza Callahan MD

## 2019-02-25 NOTE — PROGRESS NOTES
Shift assessment complete. Pt resting quietly in bed. No signs of acute distress. Resp even and unlabored. Pt denies pain. Call light within reach. Pt instructed to call for assistance.

## 2019-02-26 NOTE — PROGRESS NOTES
Pt assessment completed. Alert and oriented. Lungs CTA even and unlabored. Heart sounds regular. Abdomen soft and non tender with active bowel sounds. IV present with no s/sx of complications. Pt denies pain needs, all needs met. Will continue to monitor.

## 2019-02-26 NOTE — PROGRESS NOTES
Report received from [place], [facility]. PM assessment completed. PT is AAO x 4 with normal unlabored respirations present on 2L O2 via NC. Denies any pain. IV site is CDI. No needs at this time. Bed is low and locked with call light in reach, will continue to monitor.

## 2019-02-26 NOTE — DISCHARGE SUMMARY
Physician Discharge Summary Patient ID: 
Aristeo Sandoval 
[id number] 
88 y.o. 
11/5/1932 Admit date: 2/13/2019 Discharge date: 2- Diagnosis: 1- Colon cancer, presented with Gi bleeding which resolved. Hb stable at 9-10. Hx of renal cell carcinoma. 2- Pneumonia, s/p thoracenthesis. 3- Ascites associated with #1, also hx of congestive heart failure. S/p Paracenthesis. 4- Hypertension, controlled 5- CKD stage 2, stable serum creatinine at 1.3 6- Home Hospice arranged per patient and family request. 
 
Hospital course:  
80yr old female with pmhx sig for chf, htn, asthma. kidney cancer s/p resection, hx of a lung nodule - not known to be cancerous but s/p pneumonectomy. The patient states she has been unwell for months, with diarrhea for months. .. Last admitted to hospital in [address] [place] [address] in December after passing out at the [employer], she was given blood, scheduled for colonoscopy but poor prep so it was not done. She passed at her home in [address] [place] [address] on Saturday- daughter went to get her and brought her Welda. ... She wanted to go to the bank today. . But needed to use the bathroom on the way. . They stopped at [facility][1]-- the pt had a bm and passed out at the sink while washing her hands. .. Ems - called. ..  In the er cxr concerning for pneumonia; lactic acid elevated. hospitalist asked to admit\" The patient was admitted. She was started on abx for the pneumonia. She was also transfused 2 units of PRBCS.  She was seen by GI with plans for egd and colonoscopy once stable from a cardiology and resp point of view. Cardiology was consulted automatically in the admitting order set and have signed off. She has felt improved with diuresis and the blood. Her major concern continues the to be the diarrhea of unclear etiology. Cdiff is negative. She has no evidence of dig toxicity. She is allergic to BB with a history of swelling so these were not started.  Plan si for egd this week with gi- they have not decided on imaging for her altered anatomy or not at this time. Patient admitted and treated as above. On day of discharge, patient exam showed mildly diminished breath sounds bibasilar. Pt had no abdominal pain, any further Gi bleeding and no significant respiratory spx. She is on 2L O2. PCP: Constanza, Not On File Patient Instructions:  
Current Discharge Medication List  
  
START taking these medications Details LORazepam (ATIVAN) 1 mg tablet Take 1 Tab by mouth every six (6) hours as needed for Anxiety. Max Daily Amount: 4 mg. Qty: 30 Tab, Refills: 0 Associated Diagnoses: Anxiety  
  
morphine (ROXANOL) 100 mg/5 mL (20 mg/mL) concentrated solution Zofran 4mg po Q8h as needed, 30 tb Take 0.25 mL by mouth every four (4) hours as needed (for SOB/ pain) for up to 3 days. Max Daily Amount: 30 mg. 
Qty: 1 Bottle, Refills: 0 Associated Diagnoses: Adenocarcinoma ([address] CONTINUE these medications which have NOT CHANGED Details  
digoxin (LANOXIN) 0.125 mg tablet Take 0.125 mg by mouth daily. albuterol (PROVENTIL HFA, VENTOLIN HFA, PROAIR HFA) 90 mcg/actuation inhaler Take 2 Puffs by inhalation every six (6) hours as needed for Wheezing. ferrous sulfate 325 mg (65 mg iron) tablet Take 1 Tab by mouth daily (with breakfast). Qty: 30 Tab, Refills: 2  
  
aspirin 81 mg chewable tablet Take 81 mg by mouth daily. omeprazole (PRILOSEC) 40 mg capsule Take 40 mg by mouth daily. furosemide (LASIX) 40 mg tablet Take  by mouth daily. dilTIAZem (CARDIZEM) 120 mg tablet Take 120 mg by mouth daily. lisinopril (PRINIVIL, ZESTRIL) 5 mg tablet Take 5 mg by mouth daily. loperamide (IMMODIUM) 2 mg tablet Take 2 mg by mouth four (4) times daily as needed for Diarrhea. Indications: Diarrhea  
  
calcium carbonate (TUMS) 200 mg calcium (500 mg) chew Take 1 Tab by mouth every six (6) hours as needed. Indications: Heartburn STOP taking these medications  
  
 simvastatin (ZOCOR) 20 mg tablet Comments:  
Reason for Stopping:   
   
  
 
 
Instructions:  
 
Hospice management. 2L O2 at all time. Diet:  Low salt, low fat, Fluid <1.5 L/day. Activity: As tolerated. Fall precautions. Condition: Stable Time 35 min Please send copy to primary physician.  
 
Signed: 
Brandon Hdz MD 
2/26/2019 
9:49 AM

## 2019-02-27 NOTE — PROGRESS NOTES
This note will not be viewable in [address] No TREY outreach indicated due to discharge to hospice.

## 2019-03-06 PROBLEM — D64.9 ANEMIA: Status: ACTIVE | Noted: 2018-01-01

## 2019-03-06 PROBLEM — M00.9 SEPTIC ARTHRITIS (HCC): Status: ACTIVE | Noted: 2017-02-07

## 2019-03-06 PROBLEM — I48.91 AFIB (HCC): Status: ACTIVE | Noted: 2018-01-01

## 2019-03-06 PROBLEM — I50.9 HF (HEART FAILURE) (HCC): Status: ACTIVE | Noted: 2017-02-07

## 2019-03-06 PROBLEM — Z90.5 S/P NEPHRECTOMY: Status: ACTIVE | Noted: 2017-02-07

## 2019-03-06 PROBLEM — N18.30 STAGE 3 CHRONIC KIDNEY DISEASE (HCC): Status: ACTIVE | Noted: 2017-02-07

## 2019-03-06 PROBLEM — Z90.2 S/P LOBECTOMY OF LUNG: Status: ACTIVE | Noted: 2018-01-01

## 2019-03-07 NOTE — HOSPICE
Progress Note    Patient: Janell Avendaño MRN: [id number]  SSN: [id number]    YOB: 1932  Age: 80 y.o. Sex: female      Admit Date: (Not on file)    LOS: 0 days     Subjective:     I undertook this joint home visit with Babita De Paz RN case manager on 3/5/2019 for the purpose of establishing myself is this patient's primary physician under hospice care, as well as the purpose of ameliorating her symptoms. JOSSIE DALE is an 31-year-old woman who had several syncopal episodes and gastrointestinal bleeding and eventually came to the hospital. Laruth Members was found to be anemic and responded to packed red blood cell transfusion.  Scanning revealed a left pleural effusion at least a single hepatic metastasis.  Colonoscopy revealed a large transverse colon adenocarcinoma. The patient and her daughter elected not to pursue disease modifying treatment of her metastatic adenocarcinoma  Her BMI is diminished at 25. Angie Rahman is disabled bedbound. Now oxygen dependent due to large left-sided and small right pleural effusions. She is opioid dependent for dyspnea and pain. Atrial fibrillation presented during the February 2019 hospitalization and it remains a problem apart from metastatic adenocarcinoma of the: Associated conditions which contributes to her poor prognosis. At this point. Her life expectancy is less than 3 months. Past medical history: Status post left upper lobectomy for stage I adenocarcinoma of the lung 2003. Status post left nephrectomy for renal cell cancer in 2015 without evidence recurrence. Congestive heart failure,, hypertension, anemia and renal insufficiency were problems predating 2019. She has had facial swelling with atenolol but otherwise has no known medical allergies. She is a nonsmoker. Social history: Patient moved from [facility] to her daughter's home in [place] [address] in late 2018 due to failing health. She is adherent to the [employer].   Family history: Pertinent for death of a son at age 36 from adenocarcinoma:.  Review of Systems:  Chief concern the patient is persistent nausea. She is able to eat small volume meals only. She has constant eructation and infrequent small-volume vomiting. Diarrhea has resolved. She is now having bowel movements less than twice a week. Blood staining of the stools continues. She has no dysuria. No vaginal discharge. She has moderate anorexia. She denies pruritus. She has shortness of breath with minimal exertion. She is too weak to ambulate in the calixto. She has no sense of palpitations but does have pleuritic pain on the left with deep inhalation and cough. Her daughter describes some confusion, but reports no delusional thoughts. The patient feels tired but normal.    Objective:     Vitals:    03/05/19 2134   BP: 116/55   Pulse: 76   Resp: 18        Intake and Output:  Current Shift: No intake/output data recorded. Last three shifts: No intake/output data recorded. Physical Exam:   Exam finds a pleasant but fatigued octogenarian, black woman wearing oxygen. Neck is without adenopathy. Head is atraumatic. Oropharynx is clear. Sclera are nonicteric. Lungs are marked by dullness to percussion and auscultation. Left lower one third. There is no appreciable wheezing. Heart rhythm is irregularly irregular with controlled rate in the 80s. There is no appreciable murmur rub or gallop. Heart tones are somewhat distant. Abdomen is tender in the right upper quadrant. There is modest gaseous distention. Bowel sounds are present, slightly reduced. Rectal examination done. There is no palpable organomegaly. Extremities show asymmetric edema, right greater than left. He upper thighs. Theright upper extremity is markedly edematous, but is not hot to touch. this appears to be dependent edema. there is no focal motor or sensory deficit. Plantar reflexes downgoing bilaterally.   Strength is 4 over 5 generally reduced throughout. Lab/Data Review:  No new labs resulted in the last 24 hours. 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 Patient is becoming anemic due to chronic intestinal blood loss. She has profoundly fatigued and experiencing nausea and early satiety. Compazine has been effective in suppressing nausea. The somnolence. Patient is experiencing may be in part due to effects of Compazine. She'll consider using Haldol and moderate dose 2 mg every 4 hours when necessary for nausea and vomiting. If symptoms fail to improve over time. It is also likely that somnolence is related to the initiation of regular use of narcotic analgesics. Over, particularly in concern that the patient does not become constipated, although complete obstruction from a cecal/transverse colon lesion is unlikely. diltiazem by itself should be sufficient to control atrial fibrillation. .  In light of continued nausea, will DC dig. Initiate oxycodone 5 mg by mouth every 4 hours when necessary pain or shortness of breath. No indication for scheduled or sustained release opioid  Plan:     Current Outpatient Medications   Medication Sig Dispense    dilTIAZem CD (CARDIZEM CD) 120 mg ER capsule Take 120 mg by mouth daily.  morphine (ROXANOL) 100 mg/5 mL (20 mg/mL) concentrated solution Take 0.25 mL by mouth every three (3) hours as needed for Pain (pain/dyspnea).  prochlorperazine (COMPAZINE) 10 mg tablet Take 10 mg by mouth every six (6) hours as needed for Nausea.  OXYGEN-AIR DELIVERY SYSTEMS 2 L/min by Nasal route as needed (dyspnea/comfort).  acetaminophen (TYLENOL) 500 mg tablet Take 500 mg by mouth every four (4) hours as needed for Pain (mild pain).  senna (SENNA) 8.6 mg tablet Take 1 Tab by mouth two (2) times daily as needed for Constipation.      LORazepam (ATIVAN) 1 mg tablet Take 1 mg by mouth every six (6) hours as needed for Anxiety (anxiety or insomnia).  digoxin (LANOXIN) 0.125 mg tablet Take 0.125 mg by mouth See Admin Instructions. Tuesday, Thursday, Saturday, Sunday     albuterol (PROVENTIL HFA, VENTOLIN HFA, PROAIR HFA) 90 mcg/actuation inhaler Take 2 Puffs by inhalation every six (6) hours as needed for Wheezing.  omeprazole (PRILOSEC) 40 mg capsule Take 40 mg by mouth daily. when pt running out of current supply, change to 20mg capsule, 1 twice a day which will be covered by hospice     furosemide (LASIX) 40 mg tablet Take 40 mg by mouth daily.  loperamide (IMMODIUM) 2 mg tablet Take 2 mg by mouth four (4) times daily as needed for Diarrhea. No current facility-administered medications for this visit. Impression, plan above. I did discuss prognosis with the patient's daughter: the fact that this woman has taken to bed and debility is increasing rapidly suggest that life expectancy is closer to 1 month. At this point than 3 months, usually associated with metastatic adenocarcinoma on the abdomen.     Signed By: Hilary Goncalves MD     March 6, 2019

## 2019-04-17 VITALS — RESPIRATION RATE: 16 BRPM | HEART RATE: 140 BPM | TEMPERATURE: 97.9 F

## 2020-10-26 NOTE — PROGRESS NOTES
Conjuntivae and eyelids appear normal , Sclerae : White without injection TRANSFER - IN REPORT: 
 
Verbal report received from Marcelo de souza RN(name) on Thermon Scales  being received from ED(unit) for routine progression of care Report consisted of patients Situation, Background, Assessment and  
Recommendations(SBAR). Information from the following report(s) SBAR and Kardex was reviewed with the receiving nurse. Opportunity for questions and clarification was provided. Assessment completed upon patients arrival to unit and care assumed.

## (undated) DEVICE — BLOCK BITE AD 60FR W/ VELC STRP ADDRESSES MOST PT AND

## (undated) DEVICE — KENDALL RADIOLUCENT FOAM MONITORING ELECTRODE RECTANGULAR SHAPE: Brand: KENDALL

## (undated) DEVICE — NDL PRT INJ NSAF BLNT 18GX1.5 --

## (undated) DEVICE — CONNECTOR TBNG OD5-7MM O2 END DISP

## (undated) DEVICE — FORCEPS BX L240CM JAW DIA2.8MM L CAP W/ NDL MIC MESH TOOTH

## (undated) DEVICE — SYR 3ML LL TIP 1/10ML GRAD --

## (undated) DEVICE — SYR 5ML 1/5 GRAD LL NSAF LF --

## (undated) DEVICE — CONTAINER PREFIL FRMLN 40ML --

## (undated) DEVICE — CANNULA NSL ORAL AD FOR CAPNOFLEX CO2 O2 AIRLFE